# Patient Record
Sex: FEMALE | Race: WHITE | NOT HISPANIC OR LATINO | Employment: UNEMPLOYED | URBAN - METROPOLITAN AREA
[De-identification: names, ages, dates, MRNs, and addresses within clinical notes are randomized per-mention and may not be internally consistent; named-entity substitution may affect disease eponyms.]

---

## 2017-06-14 ENCOUNTER — OFFICE VISIT (OUTPATIENT)
Dept: FAMILY MEDICINE | Facility: CLINIC | Age: 32
End: 2017-06-14
Payer: COMMERCIAL

## 2017-06-14 VITALS
TEMPERATURE: 97 F | HEART RATE: 91 BPM | DIASTOLIC BLOOD PRESSURE: 74 MMHG | WEIGHT: 156 LBS | BODY MASS INDEX: 23.64 KG/M2 | SYSTOLIC BLOOD PRESSURE: 122 MMHG | OXYGEN SATURATION: 100 % | HEIGHT: 68 IN

## 2017-06-14 DIAGNOSIS — Z30.41 ENCOUNTER FOR SURVEILLANCE OF CONTRACEPTIVE PILLS: Primary | ICD-10-CM

## 2017-06-14 PROCEDURE — 99214 OFFICE O/P EST MOD 30 MIN: CPT | Performed by: FAMILY MEDICINE

## 2017-06-14 RX ORDER — ACETAMINOPHEN AND CODEINE PHOSPHATE 120; 12 MG/5ML; MG/5ML
1 SOLUTION ORAL DAILY
Qty: 84 TABLET | Refills: 3 | Status: SHIPPED | OUTPATIENT
Start: 2017-06-14 | End: 2018-08-15

## 2017-06-14 NOTE — PROGRESS NOTES
SUBJECTIVE:                                                    Brie Brown is a 31 year old female who presents to clinic today for the following health issues:      Would like referral to [facility] for delivery.  Lacey Mota states in-[facility], ok for referral    Provider notes:  This is a new patient to our clinic  Has been seen in women's clinic for prenatal care  Eventually had transition of care to [facility] and delivered there.   no complications    Is breastfeeding and no concerns  Has PNV and takes these  Had a 6 week PP exam and feels well    Is planning to use condoms for now but eventually might want to use OCP - would like PG only pills to limit affect on lactation      Problem list and histories reviewed & adjusted, as indicated.  Additional history: as documented    Patient Active Problem List   Diagnosis     CARDIOVASCULAR SCREENING; LDL GOAL LESS THAN 160     Papanicolaou smear of cervix with low grade squamous intraepithelial lesion (LGSIL)     Need for Tdap vaccination     History of dilation and curettage     Pregnancy, supervision, normal, first     History reviewed. No pertinent surgical history.    Social History   Substance Use Topics     Smoking status: Never Smoker     Smokeless tobacco: Never Used     Alcohol use No      Comment: socially     Family History   Problem Relation Age of Onset     HEART DISEASE Paternal Grandfather      passsed away from MI     Hypertension Father      CEREBROVASCULAR DISEASE Paternal Grandmother      Prostate Cancer Paternal Grandfather      Alcohol/Drug Paternal Grandmother      Arthritis Maternal Grandmother      Depression Father      Depression Sister      Lipids Father      Lipids Mother          Current Outpatient Prescriptions   Medication Sig Dispense Refill     norethindrone (MICRONOR) 0.35 MG per tablet Take 1 tablet (0.35 mg) by mouth daily 84 tablet 3     Prenatal Multivit-Min-Fe-FA (PRENATAL VITAMINS PO)        BP Readings  "from Last 3 Encounters:   06/14/17 122/74   09/08/16 131/73   08/10/16 105/70    Wt Readings from Last 3 Encounters:   06/14/17 156 lb (70.8 kg)   09/08/16 157 lb 6.4 oz (71.4 kg)   08/10/16 154 lb (69.9 kg)                    Reviewed and updated as needed this visit by clinical staff  Tobacco  Allergies  Meds  Med Hx  Surg Hx  Fam Hx  Soc Hx      Reviewed and updated as needed this visit by Provider         ROS:  Constitutional, HEENT, cardiovascular, pulmonary, gi and gu systems are negative, except as otherwise noted.    OBJECTIVE:                                                    /74  Pulse 91  Temp 97  F (36.1  C) (Oral)  Ht 5' 8\" (1.727 m)  Wt 156 lb (70.8 kg)  LMP 05/28/2016  SpO2 100%  Breastfeeding? Yes  BMI 23.72 kg/m2  Body mass index is 23.72 kg/(m^2).  GENERAL APPEARANCE: healthy, alert and no distress         ASSESSMENT/PLAN:                                                    1. Encounter for surveillance of contraceptive pills  Medications reviewed and sent in  Discussed risks and benefits to this OCP and how to take  - norethindrone (MICRONOR) 0.35 MG per tablet; Take 1 tablet (0.35 mg) by mouth daily  Dispense: 84 tablet; Refill: 3    2. Routine postpartum follow-up  Referral for her delivery  - OB/GYN REFERRAL      Follow up with Provider - as needed     Heena Valverde MD  [facility] [place]    "

## 2017-06-14 NOTE — MR AVS SNAPSHOT
After Visit Summary   6/14/2017    Brie Brown    MRN: [id number]           Patient Information     Date Of Birth          1985        Visit Information        Provider Department      6/14/2017 8:30 AM Heena Valverde MD [facility]        Today's Diagnoses     Encounter for surveillance of contraceptive pills    -  1    Routine postpartum follow-up           Follow-ups after your visit        Additional Services     OB/GYN REFERRAL       Your provider has referred you to:  Routine prenatal care with OBGYN/Delivery center    Please be aware that coverage of these services is subject to the terms and limitations of your health insurance plan.  Call member services at your health plan with any benefit or coverage questions.      Please bring the following with you to your appointment:    (1) Any X-Rays, CTs or MRIs which have been performed.  Contact the facility where they were done to arrange for  prior to your scheduled appointment.   (2) List of current medications   (3) This referral request   (4) Any documents/labs given to you for this referral                  Who to contact     If you have questions or need follow up information about today's clinic visit or your schedule please contact [facility] directly at [phone].  Normal or non-critical lab and imaging results will be communicated to you by MyChart, letter or phone within 4 business days after the clinic has received the results. If you do not hear from us within 7 days, please contact the clinic through MyChart or phone. If you have a critical or abnormal lab result, we will notify you by phone as soon as possible.  Submit refill requests through [employer] or call your pharmacy and they will forward the refill request to us. Please allow 3 business days for your refill to be completed.          Additional Information About Your Visit        MyChart Information     [employer] gives you secure  "access to your electronic health record. If you see a primary care provider, you can also send messages to your care team and make appointments. If you have questions, please call your primary care clinic.  If you do not have a primary care provider, please call [phone] and they will assist you.        Care EveryWhere ID     This is your Care EveryWhere ID. This could be used by other organizations to access your [place] medical records  [id number]        Your Vitals Were     Pulse Temperature Height Last Period Pulse Oximetry Breastfeeding?    91 97  F (36.1  C) (Oral) 5' 8\" (1.727 m) 05/28/2016 100% Yes    BMI (Body Mass Index)                   23.72 kg/m2            Blood Pressure from Last 3 Encounters:   06/14/17 122/74   09/08/16 131/73   08/10/16 105/70    Weight from Last 3 Encounters:   06/14/17 156 lb (70.8 kg)   09/08/16 157 lb 6.4 oz (71.4 kg)   08/10/16 154 lb (69.9 kg)              We Performed the Following     OB/GYN REFERRAL          Today's Medication Changes          These changes are accurate as of: 6/14/17 10:06 AM.  If you have any questions, ask your nurse or doctor.               Start taking these medicines.        Dose/Directions    norethindrone 0.35 MG per tablet   Commonly known as:  MICRONOR   Used for:  Encounter for surveillance of contraceptive pills   Started by:  Heena Valverde MD        Dose:  1 tablet   Take 1 tablet (0.35 mg) by mouth daily   Quantity:  84 tablet   Refills:  3         Stop taking these medicines if you haven't already. Please contact your care team if you have questions.     levonorgestrel-ethinyl estradiol 0.1-20 MG-MCG per tablet   Commonly known as:  AVIANE   Stopped by:  Heena Valverde MD           ZANTAC PO   Stopped by:  Heena Valverde MD                Where to get your medicines      These medications were sent to [id number] - [place], [place] - [address] AT [address] [place] [address]     Phone:  [phone]     norethindrone 0.35 MG per tablet                Primary Care Provider Office Phone # Fax #    Alanna Daniela Head [phone]       [address] [address]        Thank you!     Thank you for choosing [facility]  for your care. Our goal is always to provide you with excellent care. Hearing back from our patients is one way we can continue to improve our services. Please take a few minutes to complete the written survey that you may receive in the mail after your visit with us. Thank you!             Your Updated Medication List - Protect others around you: Learn how to safely use, store and throw away your medicines at www.disposemymeds.org.          This list is accurate as of: 6/14/17 10:06 AM.  Always use your most recent med list.                   Brand Name Dispense Instructions for use    norethindrone 0.35 MG per tablet    MICRONOR    84 tablet    Take 1 tablet (0.35 mg) by mouth daily       PRENATAL VITAMINS PO

## 2017-06-14 NOTE — NURSING NOTE
"Chief Complaint   Patient presents with     Referral     pt needs referral for [facility]     /74  Pulse 91  Temp 97  F (36.1  C) (Oral)  Ht 5' 8\" (1.727 m)  Wt 156 lb (70.8 kg)  LMP 05/28/2016  SpO2 100%  Breastfeeding? Yes  BMI 23.72 kg/m2 Estimated body mass index is 23.72 kg/(m^2) as calculated from the following:    Height as of this encounter: 5' 8\" (1.727 m).    Weight as of this encounter: 156 lb (70.8 kg).  Medication Reconciliation: complete      Health Maintenance due pending provider review:  NONE    n/a    Paulette Couch CMA  "

## 2017-10-04 ENCOUNTER — OFFICE VISIT (OUTPATIENT)
Dept: FAMILY MEDICINE | Facility: CLINIC | Age: 32
End: 2017-10-04
Payer: COMMERCIAL

## 2017-10-04 VITALS
HEART RATE: 78 BPM | WEIGHT: 156 LBS | RESPIRATION RATE: 16 BRPM | BODY MASS INDEX: 23.64 KG/M2 | DIASTOLIC BLOOD PRESSURE: 76 MMHG | SYSTOLIC BLOOD PRESSURE: 120 MMHG | HEIGHT: 68 IN | OXYGEN SATURATION: 100 %

## 2017-10-04 DIAGNOSIS — R07.0 THROAT PAIN: Primary | ICD-10-CM

## 2017-10-04 DIAGNOSIS — J01.00 ACUTE NON-RECURRENT MAXILLARY SINUSITIS: ICD-10-CM

## 2017-10-04 LAB
DEPRECATED S PYO AG THROAT QL EIA: NORMAL
SPECIMEN SOURCE: NORMAL

## 2017-10-04 PROCEDURE — 87081 CULTURE SCREEN ONLY: CPT | Performed by: FAMILY MEDICINE

## 2017-10-04 PROCEDURE — 87880 STREP A ASSAY W/OPTIC: CPT | Performed by: FAMILY MEDICINE

## 2017-10-04 PROCEDURE — 99214 OFFICE O/P EST MOD 30 MIN: CPT | Performed by: FAMILY MEDICINE

## 2017-10-04 RX ORDER — AZITHROMYCIN 250 MG/1
TABLET, FILM COATED ORAL
Qty: 6 TABLET | Refills: 0 | Status: SHIPPED | OUTPATIENT
Start: 2017-10-04 | End: 2018-05-24

## 2017-10-04 NOTE — MR AVS SNAPSHOT
"              After Visit Summary   10/4/2017    Brie Brown    MRN: [id number]           Patient Information     Date Of Birth          1985        Visit Information        Provider Department      10/4/2017 2:15 PM Yohana Kline MD [facility]        Today's Diagnoses     Throat pain    -  1    Acute non-recurrent maxillary sinusitis           Follow-ups after your visit        Who to contact     If you have questions or need follow up information about today's clinic visit or your schedule please contact [facility] directly at [phone].  Normal or non-critical lab and imaging results will be communicated to you by [employer], letter or phone within 4 business days after the clinic has received the results. If you do not hear from us within 7 days, please contact the clinic through [employer] or phone. If you have a critical or abnormal lab result, we will notify you by phone as soon as possible.  Submit refill requests through [employer] or call your pharmacy and they will forward the refill request to us. Please allow 3 business days for your refill to be completed.          Additional Information About Your Visit        MyChart Information     [employer] gives you secure access to your electronic health record. If you see a primary care provider, you can also send messages to your care team and make appointments. If you have questions, please call your primary care clinic.  If you do not have a primary care provider, please call [phone] and they will assist you.        Care EveryWhere ID     This is your Care EveryWhere ID. This could be used by other organizations to access your [place] medical records  [id number]        Your Vitals Were     Pulse Respirations Height Pulse Oximetry Breastfeeding? BMI (Body Mass Index)    78 16 5' 8\" (1.727 m) 100% Yes 23.72 kg/m2       Blood Pressure from Last 3 Encounters:   10/04/17 120/76   06/14/17 122/74   09/08/16 131/73    Weight from Last " 3 Encounters:   10/04/17 156 lb (70.8 kg)   06/14/17 156 lb (70.8 kg)   09/08/16 157 lb 6.4 oz (71.4 kg)              We Performed the Following     Beta strep group A culture     Rapid strep screen          Today's Medication Changes          These changes are accurate as of: 10/4/17  3:59 PM.  If you have any questions, ask your nurse or doctor.               Start taking these medicines.        Dose/Directions    azithromycin 250 MG tablet   Commonly known as:  ZITHROMAX   Used for:  Acute non-recurrent maxillary sinusitis   Started by:  Yohana Kline MD        Two tablets first day, then one tablet daily for four days.   Quantity:  6 tablet   Refills:  0            Where to get your medicines      These medications were sent to [facility] [address] [place] [address] [address] [place] [address] [place] [address]     Phone:  [phone]     azithromycin 250 MG tablet                Primary Care Provider Office Phone # Fax #    Alanna Daniela Head [phone]       [facility] [address]        Equal Access to Services     [facility]: Hadii loren castro [place], waaxda luxiang, qaybta kaalmada teri, moo ramírez . So [facility] [phone].    ATENCIÓN: Si habla español, tiene a angel disposición servicios gratuitos de asistencia lingüística. LlThe [facility] [phone].    We comply with applicable federal civil rights laws and [place] laws. We do not discriminate on the basis of race, color, national origin, age, disability, sex, sexual orientation, or gender identity.            Thank you!     Thank you for choosing [facility]  for your care. Our goal is always to provide you with excellent care. Hearing back from our patients is one way we can continue to improve our services. Please take a few minutes to complete the written survey that you may receive in the mail after your visit with us.  Thank you!             Your Updated Medication List - Protect others around you: Learn how to safely use, store and throw away your medicines at www.disposemymeds.org.          This list is accurate as of: 10/4/17  3:59 PM.  Always use your most recent med list.                   Brand Name Dispense Instructions for use Diagnosis    azithromycin 250 MG tablet    ZITHROMAX    6 tablet    Two tablets first day, then one tablet daily for four days.    Acute non-recurrent maxillary sinusitis       norethindrone 0.35 MG per tablet    MICRONOR    84 tablet    Take 1 tablet (0.35 mg) by mouth daily    Encounter for surveillance of contraceptive pills       PRENATAL VITAMINS PO

## 2017-10-04 NOTE — PROGRESS NOTES
SUBJECTIVE:   Brie Brown is a 32 year old female who presents to clinic today for the following health issues:      RESPIRATORY SYMPTOMS      Duration: 2 weeks    Description  nasal congestion, sore throat, facial pain/pressure and cough    Severity: moderate    Accompanying signs and symptoms: Extremely tired.    History (predisposing factors):  none    Precipitating or alleviating factors: Patients son was sick recently    Therapies tried and outcome:  Mucinex, Neti Pot- only getting minimal relief.            Problem list and histories reviewed & adjusted, as indicated.  Additional history: as documented    Patient Active Problem List   Diagnosis     CARDIOVASCULAR SCREENING; LDL GOAL LESS THAN 160     Papanicolaou smear of cervix with low grade squamous intraepithelial lesion (LGSIL)     Need for Tdap vaccination     History of dilation and curettage     Pregnancy, supervision, normal, first     No past surgical history on file.    Social History   Substance Use Topics     Smoking status: Never Smoker     Smokeless tobacco: Never Used     Alcohol use No      Comment: socially     Family History   Problem Relation Age of Onset     HEART DISEASE Paternal Grandfather      passsed away from MI     Hypertension Father      CEREBROVASCULAR DISEASE Paternal Grandmother      Prostate Cancer Paternal Grandfather      Alcohol/Drug Paternal Grandmother      Arthritis Maternal Grandmother      Depression Father      Depression Sister      Lipids Father      Lipids Mother          Current Outpatient Prescriptions   Medication Sig Dispense Refill     azithromycin (ZITHROMAX) 250 MG tablet Two tablets first day, then one tablet daily for four days. 6 tablet 0     norethindrone (MICRONOR) 0.35 MG per tablet Take 1 tablet (0.35 mg) by mouth daily 84 tablet 3     Prenatal Multivit-Min-Fe-FA (PRENATAL VITAMINS PO)        Allergies   Allergen Reactions     Augmentin      High fever as a child     Seasonal Allergies      Recent  "Labs   Lab Test  04/13/11   0856   TSH  1.32      BP Readings from Last 3 Encounters:   10/04/17 120/76   06/14/17 122/74   09/08/16 131/73    Wt Readings from Last 3 Encounters:   10/04/17 156 lb (70.8 kg)   06/14/17 156 lb (70.8 kg)   09/08/16 157 lb 6.4 oz (71.4 kg)                  Labs reviewed in EPIC          Reviewed and updated as needed this visit by clinical staffTobacco       Reviewed and updated as needed this visit by Provider         ROS:  Constitutional, HEENT, cardiovascular, pulmonary, GI, , musculoskeletal, neuro, skin, endocrine and psych systems are negative, except as otherwise noted.      OBJECTIVE:   /76  Pulse 78  Resp 16  Ht 5' 8\" (1.727 m)  Wt 156 lb (70.8 kg)  SpO2 100%  Breastfeeding? Yes  BMI 23.72 kg/m2  Body mass index is 23.72 kg/(m^2).  GENERAL: healthy, alert and no distress  EYES: Eyes grossly normal to inspection, PERRL and conjunctivae and sclerae normal  HENT: normal cephalic/atraumatic, ear canals and TM's normal, oropharynx clear, oral mucous membranes moist and sinuses: maxillary tenderness on bilaterally  NECK: no adenopathy, no asymmetry, masses, or scars and thyroid normal to palpation  RESP: lungs clear to auscultation - no rales, rhonchi or wheezes  CV: regular rate and rhythm, normal S1 S2, no S3 or S4, no murmur, click or rub, no peripheral edema and peripheral pulses strong  ABDOMEN: soft, nontender, no hepatosplenomegaly, no masses and bowel sounds normal  MS: no gross musculoskeletal defects noted, no edema    Diagnostic Test Results:  none     ASSESSMENT/PLAN:       1. Throat pain  Negative   - Rapid strep screen  - Beta strep group A culture    2. Acute non-recurrent maxillary sinusitis  We discussed treatment as it has been over two weeks now , also push fluids, rest , RTC if no improving or worsening.    - azithromycin (ZITHROMAX) 250 MG tablet; Two tablets first day, then one tablet daily for four days.  Dispense: 6 tablet; Refill: 0    Pt is " aware  and comfortable with the current plan.      Yohana Kline MD  [facility]

## 2017-10-05 LAB
BACTERIA SPEC CULT: NORMAL
SPECIMEN SOURCE: NORMAL

## 2018-04-01 ENCOUNTER — HEALTH MAINTENANCE LETTER (OUTPATIENT)
Age: 33
End: 2018-04-01

## 2018-05-24 ENCOUNTER — OFFICE VISIT (OUTPATIENT)
Dept: FAMILY MEDICINE | Facility: CLINIC | Age: 33
End: 2018-05-24
Payer: COMMERCIAL

## 2018-05-24 VITALS
OXYGEN SATURATION: 99 % | SYSTOLIC BLOOD PRESSURE: 91 MMHG | RESPIRATION RATE: 16 BRPM | HEART RATE: 71 BPM | WEIGHT: 145.6 LBS | DIASTOLIC BLOOD PRESSURE: 56 MMHG | TEMPERATURE: 97.6 F | BODY MASS INDEX: 22.14 KG/M2

## 2018-05-24 DIAGNOSIS — M67.911 ROTATOR CUFF DISORDER, RIGHT: Primary | ICD-10-CM

## 2018-05-24 PROCEDURE — 99213 OFFICE O/P EST LOW 20 MIN: CPT | Performed by: FAMILY MEDICINE

## 2018-05-24 NOTE — PROGRESS NOTES
SUBJECTIVE:   Brie Brown is a 32 year old female who presents to clinic today for the following health issues:      Joint Pain    Onset: x 1 month    Description:   Location: right shoulder  Character: Dull ache and moving in certain way it becomes a sharp pain     Intensity: 7/10- first waking up, but through out the day 3/10    Progression of Symptoms: worse    Accompanying Signs & Symptoms:  Other symptoms: numbness- when it first happened numbness was in ring finger. Now the ring finger is dull ache and get    History:   Previous similar pain: no       Precipitating factors:   Trauma or overuse: YES- pt stated that she slept wrong on shoulder     Alleviating factors:  Improved by: ibuprofen in the morning- Pt states that this is helping her    32-year-old who presents to the clinic for evaluation of right shoulder pain.  Pain has been present for the past 1 month.  It is usually worse in the morning when she wakes up and gets better throughout the day.  She denies any numbness or tingling but does report swelling in the right ring finger.  Medical history significant for - Swimmer tendonitis. 12 yrs ago.   The patient has a 15-month-old which she carries often.    Problem list and histories reviewed & adjusted, as indicated.  Additional history: as documented    Patient Active Problem List   Diagnosis     CARDIOVASCULAR SCREENING; LDL GOAL LESS THAN 160     Papanicolaou smear of cervix with low grade squamous intraepithelial lesion (LGSIL)     Need for Tdap vaccination     History of dilation and curettage     Pregnancy, supervision, normal, first     History reviewed. No pertinent surgical history.    Social History   Substance Use Topics     Smoking status: Never Smoker     Smokeless tobacco: Never Used     Alcohol use No      Comment: socially     Family History   Problem Relation Age of Onset     HEART DISEASE Paternal Grandfather      passsed away from MI     Hypertension Father      CEREBROVASCULAR  DISEASE Paternal Grandmother      Prostate Cancer Paternal Grandfather      Alcohol/Drug Paternal Grandmother      Arthritis Maternal Grandmother      Depression Father      Depression Sister      Lipids Father      Lipids Mother            Reviewed and updated as needed this visit by clinical staff  Tobacco  Allergies  Meds  Med Hx  Surg Hx  Fam Hx       Reviewed and updated as needed this visit by Provider         ROS:  Constitutional, HEENT, cardiovascular, pulmonary, gi and gu systems are negative, except as otherwise noted.    OBJECTIVE:     BP 91/56  Pulse 71  Temp 97.6  F (36.4  C) (Tympanic)  Resp 16  Wt 145 lb 9.6 oz (66 kg)  SpO2 99%  Breastfeeding? Yes  BMI 22.14 kg/m2  Body mass index is 22.14 kg/(m^2).  GENERAL: healthy, alert and no distress  RESP: lungs clear to auscultation - no rales, rhonchi or wheezes  CV: regular rate and rhythm, normal S1 S2, no S3 or S4, no murmur, click or rub, no peripheral edema and peripheral pulses strong  Shoulder exam: FROM in all planes, improved since last visit.  Rotator cuff/supraspinatous 5/5 strength.  With resisted external rotation has some mild pain in the right posterior shoulder/supraspinatous area. She also reports pain with abduction of the shoulder above 90 degrees.     Diagnostic Test Results:  none     ASSESSMENT/PLAN:   1. Rotator cuff disorder, right  Assessment: -Examinations consistent with a rotator cuff injury.  Patient was advised to continue with warm compress and over-the-counter medications for pain control.  She was also given written instructions regarding exercises of the shoulder.  She was advised to return to clinic if pain persists fails to improve despite the results.  Patient will be referred for physical therapy at that time.     Mayuri Monsivais MD  [facility]  PAGER: [phone] or (W): [phone]

## 2018-05-24 NOTE — PATIENT INSTRUCTIONS
Exercises for Shoulder Flexibility: External Rotation    This stretch can help restore shoulder flexibility and relieve pain over time. When stretching, be sure to breathe deeply. Follow any special instructions from your doctor or physical therapist:  1.  a doorway. Grasp the doorjamb with the hand on the frozen side. Your arm should be bent.  2. With the other hand, hold the elbow on the frozen side firmly against your body.  3. Standing in the same spot, rotate your body away from the doorjamb. Stop when you feel the stretch in the shoulder. At first, try to hold the stretch for 5 seconds.  4. Work up to doing 3 sets of this stretch, 3 times a day. Work up to holding the stretch for 30 to 60 seconds.  Note: Keep your arms as still as you can. Over time, rotate your body a little more to enhance the stretch. But be careful not to twist your back.  Frozen shoulder  Frozen shoulder is another name for adhesive capsulitis, which causes restricted movement in the shoulder. If you have frozen shoulder, this stretch may cause discomfort, especially when you first get started. A few months may pass before you achieve the results you want. But once your shoulder heals, it rarely becomes frozen again. So stick to your stretching program. If you have any questions, be sure to ask your doctor.   Date Last Reviewed: 8/16/2015 2000-2017 [employer]. [address] [place] [address], [place] [address]. All rights reserved. This information is not intended as a substitute for professional medical care. Always follow your healthcare professional's instructions.        Exercises for Shoulder Flexibility: Wall Walk    Improving your flexibility can reduce pain. Stretching exercises also can help increase your range of pain-free motion. Breathe normally when you exercise. Use smooth, fluid movements.  Note: Follow any special instructions you are given. If you feel pain, stop the exercise. If the pain continues  after stopping, call your healthcare provider:    Stand with your shoulder about 2 feet from the wall.    Raise your arm to shoulder level and gently  walk  your fingers up the wall as high as you can.    Hold for a few seconds. Then walk your fingers back down.    Repeat 3 times. Move closer to the wall as you repeat.    Build up to holding each stretch for 30 seconds.  Caution: Do this stretch only if your healthcare provider recommends it. Don t do it when you are first injured.       Date Last Reviewed: 8/16/2015 2000-2017 [employer]. [address] [place] [address]. All rights reserved. This information is not intended as a substitute for professional medical care. Always follow your healthcare professional's instructions.        Exercises for Shoulder Flexibility: Internal Rotation    This stretch can help restore shoulder flexibility and relieve pain over time. When stretching, be sure to breathe deeply. Follow any special instructions from your healthcare provider or physical therapist.  1. While seated, move the arm on the side you want to stretch toward the middle of your back. The palm of your hand should face out.  2. Cup your other hand under the hand that s behind your back. Gently push your cupped hand upward until you feel the stretch in the shoulder. Try to hold the stretch for 5 seconds.  3. Work up to doing 3 sets of this stretch, 3 times a day. Work up to holding the stretch for 30 to 60 seconds.  Note: Keep your back straight. It s OK if your hand can t reach the middle of your back. Instead, start the stretch with your hand as close as you can get it to the middle of your back.  Frozen shoulder  Frozen shoulder is another name for adhesive capsulitis. This causes restricted movement in the shoulder. If you have frozen shoulder, this stretch may cause discomfort, especially when you first get started. A few months may pass before you achieve the results you want. But  once your shoulder heals, it rarely becomes frozen again. So stick to your stretching program. If you have any questions, be sure to ask your healthcare provider.   Date Last Reviewed: 12/1/2017 2000-2017 [employer]. [address] [place] [address], [place] [address]. All rights reserved. This information is not intended as a substitute for professional medical care. Always follow your healthcare professional's instructions.

## 2018-05-24 NOTE — MR AVS SNAPSHOT
After Visit Summary   5/24/2018    Brie Brown    MRN: [id number]           Patient Information     Date Of Birth          1985        Visit Information        Provider Department      5/24/2018 11:20 AM Mayuri Monsivais MD [facility]        Today's Diagnoses     Shoulder impingement syndrome, left    -  1      Care Instructions      Exercises for Shoulder Flexibility: External Rotation    This stretch can help restore shoulder flexibility and relieve pain over time. When stretching, be sure to breathe deeply. Follow any special instructions from your doctor or physical therapist:  1.  a doorway. Grasp the doorjamb with the hand on the frozen side. Your arm should be bent.  2. With the other hand, hold the elbow on the frozen side firmly against your body.  3. Standing in the same spot, rotate your body away from the doorjamb. Stop when you feel the stretch in the shoulder. At first, try to hold the stretch for 5 seconds.  4. Work up to doing 3 sets of this stretch, 3 times a day. Work up to holding the stretch for 30 to 60 seconds.  Note: Keep your arms as still as you can. Over time, rotate your body a little more to enhance the stretch. But be careful not to twist your back.  Frozen shoulder  Frozen shoulder is another name for adhesive capsulitis, which causes restricted movement in the shoulder. If you have frozen shoulder, this stretch may cause discomfort, especially when you first get started. A few months may pass before you achieve the results you want. But once your shoulder heals, it rarely becomes frozen again. So stick to your stretching program. If you have any questions, be sure to ask your doctor.   Date Last Reviewed: 8/16/2015 2000-2017 [employer]. [address] [place] [address]. All rights reserved. This information is not intended as a substitute for professional medical care. Always follow your healthcare professional's  instructions.        Exercises for Shoulder Flexibility: Wall Walk    Improving your flexibility can reduce pain. Stretching exercises also can help increase your range of pain-free motion. Breathe normally when you exercise. Use smooth, fluid movements.  Note: Follow any special instructions you are given. If you feel pain, stop the exercise. If the pain continues after stopping, call your healthcare provider:    Stand with your shoulder about 2 feet from the wall.    Raise your arm to shoulder level and gently  walk  your fingers up the wall as high as you can.    Hold for a few seconds. Then walk your fingers back down.    Repeat 3 times. Move closer to the wall as you repeat.    Build up to holding each stretch for 30 seconds.  Caution: Do this stretch only if your healthcare provider recommends it. Don t do it when you are first injured.       Date Last Reviewed: 8/16/2015 2000-2017 [employer]. [address] [place] [address]. All rights reserved. This information is not intended as a substitute for professional medical care. Always follow your healthcare professional's instructions.        Exercises for Shoulder Flexibility: Internal Rotation    This stretch can help restore shoulder flexibility and relieve pain over time. When stretching, be sure to breathe deeply. Follow any special instructions from your healthcare provider or physical therapist.  1. While seated, move the arm on the side you want to stretch toward the middle of your back. The palm of your hand should face out.  2. Cup your other hand under the hand that s behind your back. Gently push your cupped hand upward until you feel the stretch in the shoulder. Try to hold the stretch for 5 seconds.  3. Work up to doing 3 sets of this stretch, 3 times a day. Work up to holding the stretch for 30 to 60 seconds.  Note: Keep your back straight. It s OK if your hand can t reach the middle of your back. Instead, start the  stretch with your hand as close as you can get it to the middle of your back.  Frozen shoulder  Frozen shoulder is another name for adhesive capsulitis. This causes restricted movement in the shoulder. If you have frozen shoulder, this stretch may cause discomfort, especially when you first get started. A few months may pass before you achieve the results you want. But once your shoulder heals, it rarely becomes frozen again. So stick to your stretching program. If you have any questions, be sure to ask your healthcare provider.   Date Last Reviewed: 12/1/2017 2000-2017 "[employer]. [address] [place] [address]. All rights reserved. This information is not intended as a substitute for professional medical care. Always follow your healthcare professional's instructions.                Follow-ups after your visit        Follow-up notes from your care team     Return in about 4 weeks (around 6/21/2018).      Who to contact     If you have questions or need follow up information about today's clinic visit or your schedule please contact [facility] directly at [phone].  Normal or non-critical lab and imaging results will be communicated to you by Getbazzahart, letter or phone within 4 business days after the clinic has received the results. If you do not hear from us within 7 days, please contact the clinic through "[employer]t or phone. If you have a critical or abnormal lab result, we will notify you by phone as soon as possible.  Submit refill requests through [facility] or call your pharmacy and they will forward the refill request to us. Please allow 3 business days for your refill to be completed.          Additional Information About Your Visit        GetbazzaharFirmPlay Information     [facility] gives you secure access to your electronic health record. If you see a primary care provider, you can also send messages to your care team and make appointments. If you have questions, please call your  primary care clinic.  If you do not have a primary care provider, please call [phone] and they will assist you.        Care EveryWhere ID     This is your Care EveryWhere ID. This could be used by other organizations to access your [place] medical records  [id number]        Your Vitals Were     Pulse Temperature Respirations Pulse Oximetry Breastfeeding? BMI (Body Mass Index)    71 97.6  F (36.4  C) (Tympanic) 16 99% Yes 22.14 kg/m2       Blood Pressure from Last 3 Encounters:   05/24/18 91/56   10/04/17 120/76   06/14/17 122/74    Weight from Last 3 Encounters:   05/24/18 145 lb 9.6 oz (66 kg)   10/04/17 156 lb (70.8 kg)   06/14/17 156 lb (70.8 kg)              Today, you had the following     No orders found for display       Primary Care Provider Office Phone # Fax #    Alanna Head [phone] [address] [facility] [address]        Equal Access to Services     CORINNA [facility] Hospital[facility]Y : Hadii aad ku hadasho Soomaali, waaxda luqadaha, qaybta kaalmada adeegyada, moo irby haymarie ramírez . So [facility] [phone].    ATENCIÓN: Si habla español, tiene a angel disposición servicios gratuitos de asistencia lingüística. Llame al [phone].    We comply with applicable federal civil rights laws and [place] laws. We do not discriminate on the basis of race, color, national origin, age, disability, sex, sexual orientation, or gender identity.            Thank you!     Thank you for choosing [facility]  for your care. Our goal is always to provide you with excellent care. Hearing back from our patients is one way we can continue to improve our services. Please take a few minutes to complete the written survey that you may receive in the mail after your visit with us. Thank you!             Your Updated Medication List - Protect others around you: Learn how to safely use, store and throw away your medicines at www.disposemymeds.org.          This list is accurate as  of 5/24/18 11:37 AM.  Always use your most recent med list.                   Brand Name Dispense Instructions for use Diagnosis    norethindrone 0.35 MG per tablet    MICRONOR    84 tablet    Take 1 tablet (0.35 mg) by mouth daily    Encounter for surveillance of contraceptive pills       PRENATAL VITAMINS PO

## 2018-08-06 ENCOUNTER — NURSE TRIAGE (OUTPATIENT)
Dept: NURSING | Facility: CLINIC | Age: 33
End: 2018-08-06

## 2018-08-06 ENCOUNTER — TELEPHONE (OUTPATIENT)
Dept: OBGYN | Facility: CLINIC | Age: 33
End: 2018-08-06

## 2018-08-06 NOTE — TELEPHONE ENCOUNTER
"Brie has upcoming first prenatal appt with Bobby BRADFORD CNP on 8/21/18.  She reports history of anemia with previous pregnancies, and is feeling \"cold a lot\".  Wondering if she can and should start taking OTC Ferrous Gluconate 325 mg daily ?   Or should she wait to be seen ?  Unable to give permission to do so.  Can you please call Brie and advise ?      Thanks  Kristi David RN  FNA    "

## 2018-08-07 DIAGNOSIS — O21.9 NAUSEA AND VOMITING IN PREGNANCY: Primary | ICD-10-CM

## 2018-08-07 RX ORDER — ONDANSETRON 4 MG/1
4-8 TABLET, ORALLY DISINTEGRATING ORAL EVERY 8 HOURS PRN
Qty: 40 TABLET | Refills: 1 | Status: SHIPPED | OUTPATIENT
Start: 2018-08-07 | End: 2018-11-07

## 2018-08-07 NOTE — TELEPHONE ENCOUNTER
Spoke with Brie re: NOB labs and waiting for iron level before supplementing.  Pt agreeable to wait until 8/15/18 as she will be having lab work done that visit.  Heydi BRADFORD CNM

## 2018-08-14 PROBLEM — D64.9 ANEMIA: Status: ACTIVE | Noted: 2018-08-14

## 2018-08-15 ENCOUNTER — OFFICE VISIT (OUTPATIENT)
Dept: OBGYN | Facility: CLINIC | Age: 33
End: 2018-08-15
Attending: ADVANCED PRACTICE MIDWIFE
Payer: COMMERCIAL

## 2018-08-15 ENCOUNTER — RADIANT APPOINTMENT (OUTPATIENT)
Dept: ULTRASOUND IMAGING | Facility: CLINIC | Age: 33
End: 2018-08-15
Attending: ADVANCED PRACTICE MIDWIFE
Payer: COMMERCIAL

## 2018-08-15 VITALS
BODY MASS INDEX: 21.32 KG/M2 | SYSTOLIC BLOOD PRESSURE: 101 MMHG | HEIGHT: 68 IN | DIASTOLIC BLOOD PRESSURE: 65 MMHG | WEIGHT: 140.7 LBS | HEART RATE: 86 BPM

## 2018-08-15 DIAGNOSIS — Z34.80 SUPERVISION OF OTHER NORMAL PREGNANCY, ANTEPARTUM: Primary | ICD-10-CM

## 2018-08-15 DIAGNOSIS — Z34.91 ENCOUNTER FOR SUPERVISION OF NORMAL PREGNANCY IN FIRST TRIMESTER, UNSPECIFIED GRAVIDITY: ICD-10-CM

## 2018-08-15 PROBLEM — D64.9 ANEMIA: Status: RESOLVED | Noted: 2018-08-14 | Resolved: 2018-08-15

## 2018-08-15 PROCEDURE — 76801 OB US < 14 WKS SINGLE FETUS: CPT

## 2018-08-15 PROCEDURE — G0463 HOSPITAL OUTPT CLINIC VISIT: HCPCS | Mod: 25,ZF

## 2018-08-15 PROCEDURE — 86900 BLOOD TYPING SEROLOGIC ABO: CPT | Performed by: ADVANCED PRACTICE MIDWIFE

## 2018-08-15 NOTE — LETTER
8/15/2018       RE: Brie Brown  [address]  [facility] [address]     Dear Colleague,    Thank you for referring your patient, Brie Brown, to the [facility] at [facility]. Please see a copy of my visit note below.    [facility] OB Intake note  Subjective   33 year old woman presents to clinic for initiation of OB care.  Patient's last menstrual period was 06/15/2018.  at 8w5d by Estimated Date of Delivery: Mar 22, 2019 based on LMP US confirms. Reviewed dating ultrasound. Pregnancy is planned.     Of note, Brie's first son was born at the [facility]. Overall she had a positive birth experience, however, her son was later admitted briefly to the NICU for TTN. She feels it would have been a smoother experience had he been born in hospital, which is why she is choosing to labor in the hospital this time.       Symptoms since LMP include nausea and vomiting, constipation, occasional cramping, insomnia.  Patient has tried these relief measures: Zofran, diet modification, vitamin B-6 and small frequent meals.     Caffeine intake: none.   Tobacco, EtOH, Drug use: none  R/f TORCH exposure: son in . Desires CMV testing.   Dietary restrictions: none    - Genetic/Infection questionnaire completed, risks include: none identified. Sister has VWD but Brie has tested negative.   Have you traveled during the pregnancy? No. Planning a trip to [place] in September.   Have your sexual partner(s) travelled during the pregnancy? No.     - Current Medications   Current Outpatient Prescriptions   Medication Sig Dispense Refill     ondansetron (ZOFRAN ODT) 4 MG ODT tab Take 1-2 tablets (4-8 mg) by mouth every 8 hours as needed for nausea 40 tablet 1     Prenatal Multivit-Min-Fe-FA (PRENATAL VITAMINS PO)            - Co-morbids   Past Medical History:   Diagnosis Date     Abnormal Pap smear     normals since     Anemia 2016    during last pregnancy      LSIL (low grade squamous intraepithelial lesion) on Pap smear 09     NO ACTIVE PROBLEMS      - Risk for GDM -  None identified.    - The patient does not h/o Pre Eclampsia, Current multi fetal gestation, Pre Gestational Diabetes (Type 1 or Type 2), chronic hypertension, renal disease, Autoimmune disease (systematic lupus erythematosus, antiphospholipid syndrome) so WILL NOT start low dose aspirin (81mg) starting between 12 and 28 weeks to prevent preeclampsia.    - The patient  does not have a history of spontaneous  birth so  WILL NOT consider progesterone starting at 16-20 weeks and/or serial transvaginal cervical length ultrasounds from 16-24 weeks.         PERSONAL/SOCIAL HISTORY   to [place], [place] [facility] . He is from [place].  Lives with their spouse.  Employment: Full time.  Her job involves light activity .  Additional items: Denies past or present domestic violence, sexual and psychological abuse.    Objective  -VS: reviewed and within normal limits   -General appearance: no acute distress, patient is comfortable   NEUROLOGICAL/PSYCHIATRIC   - Orientated x3,   -Mood and affect: : normal     Assessment/Plan  Brie was seen today for prenatal care.    Diagnoses and all orders for this visit:    Supervision of other normal pregnancy, antepartum  -     ABO/Rh type and screen  -     Hepatitis B surface antigen  -     CBC with platelets  -     HIV Antigen Antibody Combo  -     Rubella Antibody IgG Quantitative  -     Treponema Abs w Reflex to RPR and Titer  -     Urine Culture Aerobic Bacterial  -     Vitamin D Deficiency  -     CMV Antibody IgG  -     CMV antibody IgM  -     Cancel: [facility] REFERRAL-PREGNANCY      33 year old  8w5d weeks of pregnancy with YANG of Mar 22, 2019 by LMP of Patient's last menstrual period was 06/15/2018.  Outpatient Encounter Prescriptions as of 8/15/2018   Medication Sig Dispense Refill     ondansetron (ZOFRAN ODT) 4 MG  ODT tab Take 1-2 tablets (4-8 mg) by mouth every 8 hours as needed for nausea 40 tablet 1     Prenatal Multivit-Min-Fe-FA (PRENATAL VITAMINS PO)        [DISCONTINUED] norethindrone (MICRONOR) 0.35 MG per tablet Take 1 tablet (0.35 mg) by mouth daily (Patient not taking: Reported on 5/24/2018) 84 tablet 3     No facility-administered encounter medications on file as of 8/15/2018.       Orders Placed This Encounter   Procedures     Hepatitis B surface antigen     CBC with platelets     HIV Antigen Antibody Combo     Rubella Antibody IgG Quantitative     Treponema Abs w Reflex to RPR and Titer     Vitamin D Deficiency     CMV Antibody IgG     CMV antibody IgM     ABO/Rh type and screen       - Oriented to Practice, types of care, and how to reach a provider.  Pt prefers CN care.   - Patient received 1st trimester new OB education packet complete with aide of [facility] booklet including information on genetic screening test options.  - Patient would like first trimester screening, but would like to wait until the next visit to order (works at [employer], has not shared pregnancy with coworkers yet)  - Patient was encouraged to continue prenatal vitamins as tolerated.    - Patient was sent to lab for routine OB labs including CMV igG/IgM.     - CMV prevention discussed.  - Pregnancy concerns to be addressed by provider at new OB exam include: 1 order first trimester screen and level II, 2. Due for pap 3. Travel precautions for trip to [place] in September    Pt to RTO for NOB visit in 2 weeks and prn if questions or concerns    SANCHEZ Pugh CNM            Again, thank you for allowing me to participate in the care of your patient.      Sincerely,    SANCHEZ Pugh CNM

## 2018-08-15 NOTE — MR AVS SNAPSHOT
After Visit Summary   8/15/2018    Brie Brown    MRN: [id number]           Patient Information     Date Of Birth          1985        Visit Information        Provider Department      8/15/2018 2:00 PM Lindsay Jean APRN CNM [facility]        Today's Diagnoses     Supervision of other normal pregnancy, antepartum    -  1       Follow-ups after your visit        Follow-up notes from your care team     Return in about 2 weeks (around 8/29/2018) for NOB.      Your next 10 appointments already scheduled     Aug 29, 2018  8:45 AM CDT   NEW OB with SANCHEZ Bolden CNM   [facility] ([facility])    [facility]  [address]  [address]  [facility] [address] [phone]              Who to contact     Please call your clinic at [phone] to:    Ask questions about your health    Make or cancel appointments    Discuss your medicines    Learn about your test results    Speak to your doctor            Additional Information About Your Visit        MyChart Information     [employer] gives you secure access to your electronic health record. If you see a primary care provider, you can also send messages to your care team and make appointments. If you have questions, please call your primary care clinic.  If you do not have a primary care provider, please call [phone] and they will assist you.      [employer] is an electronic gateway that provides easy, online access to your medical records. With [employer], you can request a clinic appointment, read your test results, renew a prescription or communicate with your care team.     To access your existing account, please contact your [facility] or call [phone] for assistance.        Care EveryWhere ID     This is your Care EveryWhere ID. This could be used by other organizations to access your [place] medical records  [id number]    "     Your Vitals Were     Pulse Height Last Period BMI (Body Mass Index)          86 1.727 m (5' 8\") 06/15/2018 21.39 kg/m2         Blood Pressure from Last 3 Encounters:   08/15/18 101/65   05/24/18 91/56   10/04/17 120/76    Weight from Last 3 Encounters:   08/15/18 63.8 kg (140 lb 11.2 oz)   05/24/18 66 kg (145 lb 9.6 oz)   10/04/17 70.8 kg (156 lb)              We Performed the Following     ABO/Rh type and screen     CBC with platelets     CMV Antibody IgG     CMV antibody IgM     Hepatitis B surface antigen     HIV Antigen Antibody Combo     Rubella Antibody IgG Quantitative     Treponema Abs w Reflex to RPR and Titer     Urine Culture Aerobic Bacterial     Vitamin D Deficiency          Today's Medication Changes          These changes are accurate as of 8/15/18  3:32 PM.  If you have any questions, ask your nurse or doctor.               Stop taking these medicines if you haven't already. Please contact your care team if you have questions.     norethindrone 0.35 MG per tablet   Commonly known as:  MICRONOR   Stopped by:  Lindsay Jean APRN CNM                    Primary Care Provider Office Phone # Fax #    Alanna Daniela Head [phone]       [facility] [place] [address]        Equal Access to Services     MARCUS SNYDER AH: Rafaela zarcoo [place], waaxda luqadaha, qaybta kaalmada adeegyada, moo anderson. So [facility] [phone].    ATENCIÓN: Si habla español, tiene a angel disposición servicios gratuitos de asistencia lingüística. ame al [phone].    We comply with applicable federal civil rights laws and [place] laws. We do not discriminate on the basis of race, color, national origin, age, disability, sex, sexual orientation, or gender identity.            Thank you!     Thank you for choosing [facility]  for your care. Our goal is always to provide you with excellent care. Hearing back from our patients is one way we " can continue to improve our services. Please take a few minutes to complete the written survey that you may receive in the mail after your visit with us. Thank you!             Your Updated Medication List - Protect others around you: Learn how to safely use, store and throw away your medicines at www.disposemymeds.org.          This list is accurate as of 8/15/18  3:32 PM.  Always use your most recent med list.                   Brand Name Dispense Instructions for use Diagnosis    ondansetron 4 MG ODT tab    ZOFRAN ODT    40 tablet    Take 1-2 tablets (4-8 mg) by mouth every 8 hours as needed for nausea    Nausea and vomiting in pregnancy       PRENATAL VITAMINS PO

## 2018-08-15 NOTE — PROGRESS NOTES
S OB Intake note  Subjective   33 year old woman presents to clinic for initiation of OB care.  Patient's last menstrual period was 06/15/2018.  at 8w5d by Estimated Date of Delivery: Mar 22, 2019 based on LMP US confirms. Reviewed dating ultrasound. Pregnancy is planned.     Of note, Brie's first son was born at the [facility]. Overall she had a positive birth experience, however, her son was later admitted briefly to the NICU for TTN. She feels it would have been a smoother experience had he been born in hospital, which is why she is choosing to labor in the hospital this time.       Symptoms since LMP include nausea and vomiting, constipation, occasional cramping, insomnia.  Patient has tried these relief measures: Zofran, diet modification, vitamin B-6 and small frequent meals.     Caffeine intake: none.   Tobacco, EtOH, Drug use: none  R/f TORCH exposure: son in . Desires CMV testing.   Dietary restrictions: none    - Genetic/Infection questionnaire completed, risks include: none identified. Sister has VWD but Brie has tested negative.   Have you traveled during the pregnancy? No. Planning a trip to [place] in September.   Have your sexual partner(s) travelled during the pregnancy? No.     - Current Medications   Current Outpatient Prescriptions   Medication Sig Dispense Refill     ondansetron (ZOFRAN ODT) 4 MG ODT tab Take 1-2 tablets (4-8 mg) by mouth every 8 hours as needed for nausea 40 tablet 1     Prenatal Multivit-Min-Fe-FA (PRENATAL VITAMINS PO)            - Co-morbids   Past Medical History:   Diagnosis Date     Abnormal Pap smear     normals since     Anemia 2016    during last pregnancy     LSIL (low grade squamous intraepithelial lesion) on Pap smear 09     NO ACTIVE PROBLEMS      - Risk for GDM -  None identified.    - The patient does not h/o Pre Eclampsia, Current multi fetal gestation, Pre Gestational Diabetes (Type 1 or Type 2), chronic hypertension,  renal disease, Autoimmune disease (systematic lupus erythematosus, antiphospholipid syndrome) so WILL NOT start low dose aspirin (81mg) starting between 12 and 28 weeks to prevent preeclampsia.    - The patient  does not have a history of spontaneous  birth so  WILL NOT consider progesterone starting at 16-20 weeks and/or serial transvaginal cervical length ultrasounds from 16-24 weeks.         PERSONAL/SOCIAL HISTORY   to More, who she met in [facility] at the . He is from [place].  Lives with their spouse.  Employment: Full time.  Her job involves light activity .  Additional items: Denies past or present domestic violence, sexual and psychological abuse.    Objective  -VS: reviewed and within normal limits   -General appearance: no acute distress, patient is comfortable   NEUROLOGICAL/PSYCHIATRIC   - Orientated x3,   -Mood and affect: : normal     Assessment/Plan  Brie was seen today for prenatal care.    Diagnoses and all orders for this visit:    Supervision of other normal pregnancy, antepartum  -     ABO/Rh type and screen  -     Hepatitis B surface antigen  -     CBC with platelets  -     HIV Antigen Antibody Combo  -     Rubella Antibody IgG Quantitative  -     Treponema Abs w Reflex to RPR and Titer  -     Urine Culture Aerobic Bacterial  -     Vitamin D Deficiency  -     CMV Antibody IgG  -     CMV antibody IgM  -     Cancel: [facility] REFERRAL-PREGNANCY      33 year old  8w5d weeks of pregnancy with YANG of Mar 22, 2019 by LMP of Patient's last menstrual period was 06/15/2018.  Outpatient Encounter Prescriptions as of 8/15/2018   Medication Sig Dispense Refill     ondansetron (ZOFRAN ODT) 4 MG ODT tab Take 1-2 tablets (4-8 mg) by mouth every 8 hours as needed for nausea 40 tablet 1     Prenatal Multivit-Min-Fe-FA (PRENATAL VITAMINS PO)        [DISCONTINUED] norethindrone (MICRONOR) 0.35 MG per tablet Take 1 tablet (0.35 mg) by mouth daily (Patient not taking: Reported on  5/24/2018) 84 tablet 3     No facility-administered encounter medications on file as of 8/15/2018.       Orders Placed This Encounter   Procedures     Hepatitis B surface antigen     CBC with platelets     HIV Antigen Antibody Combo     Rubella Antibody IgG Quantitative     Treponema Abs w Reflex to RPR and Titer     Vitamin D Deficiency     CMV Antibody IgG     CMV antibody IgM     ABO/Rh type and screen       - Oriented to Practice, types of care, and how to reach a provider.  Pt prefers [facility] care.   - Patient received 1st trimester new OB education packet complete with aide of [facility] booklet including information on genetic screening test options.  - Patient would like first trimester screening, but would like to wait until the next visit to order (works at [employer], has not shared pregnancy with coworkers yet)  - Patient was encouraged to continue prenatal vitamins as tolerated.    - Patient was sent to lab for routine OB labs including CMV igG/IgM.     - CMV prevention discussed.  - Pregnancy concerns to be addressed by provider at new OB exam include: 1 order first trimester screen and level II, 2. Due for pap 3. Travel precautions for trip to [place] in September    Pt to RTO for NOB visit in 2 weeks and prn if questions or concerns    Lindsay Jean, SANCHEZ CNM

## 2018-08-16 DIAGNOSIS — Z34.80 SUPERVISION OF OTHER NORMAL PREGNANCY, ANTEPARTUM: ICD-10-CM

## 2018-08-16 LAB
ABO + RH BLD: NORMAL
ABO + RH BLD: NORMAL
BLD GP AB SCN SERPL QL: NORMAL
BLOOD BANK CMNT PATIENT-IMP: NORMAL
DEPRECATED CALCIDIOL+CALCIFEROL SERPL-MC: 35 UG/L (ref 20–75)
ERYTHROCYTE [DISTWIDTH] IN BLOOD BY AUTOMATED COUNT: 12 % (ref 10–15)
HBV SURFACE AG SERPL QL IA: NONREACTIVE
HCT VFR BLD AUTO: 34.5 % (ref 35–47)
HGB BLD-MCNC: 11.9 G/DL (ref 11.7–15.7)
HIV 1+2 AB+HIV1 P24 AG SERPL QL IA: NONREACTIVE
MCH RBC QN AUTO: 30.7 PG (ref 26.5–33)
MCHC RBC AUTO-ENTMCNC: 34.5 G/DL (ref 31.5–36.5)
MCV RBC AUTO: 89 FL (ref 78–100)
PLATELET # BLD AUTO: 224 10E9/L (ref 150–450)
RBC # BLD AUTO: 3.88 10E12/L (ref 3.8–5.2)
SPECIMEN EXP DATE BLD: NORMAL
WBC # BLD AUTO: 8 10E9/L (ref 4–11)

## 2018-08-16 PROCEDURE — 86850 RBC ANTIBODY SCREEN: CPT | Performed by: ADVANCED PRACTICE MIDWIFE

## 2018-08-16 PROCEDURE — 87340 HEPATITIS B SURFACE AG IA: CPT | Performed by: ADVANCED PRACTICE MIDWIFE

## 2018-08-16 PROCEDURE — 86762 RUBELLA ANTIBODY: CPT | Performed by: ADVANCED PRACTICE MIDWIFE

## 2018-08-16 PROCEDURE — 86901 BLOOD TYPING SEROLOGIC RH(D): CPT | Performed by: ADVANCED PRACTICE MIDWIFE

## 2018-08-16 PROCEDURE — 86644 CMV ANTIBODY: CPT | Performed by: ADVANCED PRACTICE MIDWIFE

## 2018-08-16 PROCEDURE — 36415 COLL VENOUS BLD VENIPUNCTURE: CPT | Performed by: ADVANCED PRACTICE MIDWIFE

## 2018-08-16 PROCEDURE — 86645 CMV ANTIBODY IGM: CPT | Performed by: ADVANCED PRACTICE MIDWIFE

## 2018-08-16 PROCEDURE — 85027 COMPLETE CBC AUTOMATED: CPT | Performed by: ADVANCED PRACTICE MIDWIFE

## 2018-08-16 PROCEDURE — 86780 TREPONEMA PALLIDUM: CPT | Performed by: ADVANCED PRACTICE MIDWIFE

## 2018-08-16 PROCEDURE — 87389 HIV-1 AG W/HIV-1&-2 AB AG IA: CPT | Performed by: ADVANCED PRACTICE MIDWIFE

## 2018-08-16 PROCEDURE — 87086 URINE CULTURE/COLONY COUNT: CPT | Performed by: ADVANCED PRACTICE MIDWIFE

## 2018-08-16 PROCEDURE — 86900 BLOOD TYPING SEROLOGIC ABO: CPT | Performed by: ADVANCED PRACTICE MIDWIFE

## 2018-08-16 PROCEDURE — 82306 VITAMIN D 25 HYDROXY: CPT | Performed by: ADVANCED PRACTICE MIDWIFE

## 2018-08-17 LAB
BACTERIA SPEC CULT: NORMAL
CMV IGG SERPL QL IA: >8 AI (ref 0–0.8)
CMV IGM SERPL QL IA: 0.7 AI (ref 0–0.8)
Lab: NORMAL
RUBV IGG SERPL IA-ACNC: 58 IU/ML
SPECIMEN SOURCE: NORMAL
T PALLIDUM AB SER QL: NONREACTIVE

## 2018-08-29 ENCOUNTER — OFFICE VISIT (OUTPATIENT)
Dept: OBGYN | Facility: CLINIC | Age: 33
End: 2018-08-29
Attending: ADVANCED PRACTICE MIDWIFE
Payer: COMMERCIAL

## 2018-08-29 VITALS
HEART RATE: 94 BPM | BODY MASS INDEX: 21.29 KG/M2 | WEIGHT: 140.5 LBS | DIASTOLIC BLOOD PRESSURE: 63 MMHG | SYSTOLIC BLOOD PRESSURE: 94 MMHG | HEIGHT: 68 IN

## 2018-08-29 DIAGNOSIS — R87.612 PAPANICOLAOU SMEAR OF CERVIX WITH LOW GRADE SQUAMOUS INTRAEPITHELIAL LESION (LGSIL): ICD-10-CM

## 2018-08-29 DIAGNOSIS — Z34.80 SUPERVISION OF OTHER NORMAL PREGNANCY, ANTEPARTUM: Primary | ICD-10-CM

## 2018-08-29 PROCEDURE — 87491 CHLMYD TRACH DNA AMP PROBE: CPT | Performed by: ADVANCED PRACTICE MIDWIFE

## 2018-08-29 PROCEDURE — 87591 N.GONORRHOEAE DNA AMP PROB: CPT | Performed by: ADVANCED PRACTICE MIDWIFE

## 2018-08-29 PROCEDURE — G0463 HOSPITAL OUTPT CLINIC VISIT: HCPCS | Mod: ZF

## 2018-08-29 ASSESSMENT — ANXIETY QUESTIONNAIRES
1. FEELING NERVOUS, ANXIOUS, OR ON EDGE: NOT AT ALL
5. BEING SO RESTLESS THAT IT IS HARD TO SIT STILL: NOT AT ALL
6. BECOMING EASILY ANNOYED OR IRRITABLE: NOT AT ALL
GAD7 TOTAL SCORE: 0
3. WORRYING TOO MUCH ABOUT DIFFERENT THINGS: NOT AT ALL
7. FEELING AFRAID AS IF SOMETHING AWFUL MIGHT HAPPEN: NOT AT ALL
2. NOT BEING ABLE TO STOP OR CONTROL WORRYING: NOT AT ALL

## 2018-08-29 ASSESSMENT — PATIENT HEALTH QUESTIONNAIRE - PHQ9: 5. POOR APPETITE OR OVEREATING: NOT AT ALL

## 2018-08-29 NOTE — PROGRESS NOTES
Subjective:  Brie is a 33 year old female who presents to clinic for a new OB visit.   at 10w5d with Estimated Date of Delivery: Mar 22, 2019 based on LMP. Feels well. Has started PNV.     She has not had bleeding since her LMP.   She has had mild nausea, but has been improving with Unisom and B6. Weight loss has occurred, for a total of 4 pounds.   This was a planned pregnancy.   FOB is involved.     OTHER CONCERNS: 1st trimester screening, nausea/vomiting    ===========================================   ROS: 10 point ROS neg other than the symptoms noted above in the HPI.    PSYCHIATRIC:  Denies mood changes  PHQ-9 score:    PHQ-9 SCORE 2018   Total Score 2     SOPHIE-7 SCORE 2018   Total Score 0     Past History:  Her past medical history   Past Medical History:   Diagnosis Date     Abnormal Pap smear     normals since     Anemia 2016    during last pregnancy     LSIL (low grade squamous intraepithelial lesion) on Pap smear 09     NO ACTIVE PROBLEMS      Her past pregnancies have been uncomplicated  Since her last LMP she denies use of alcohol, tobacco and street drugs.    HISTORY:  Family History   Problem Relation Age of Onset     Lipids Mother      Hypertension Father      Depression Father      Lipids Father      HEART DISEASE Paternal Grandfather      passsed away from MI     Prostate Cancer Paternal Grandfather      Cerebrovascular Disease Paternal Grandmother      Alcohol/Drug Paternal Grandmother      Arthritis Maternal Grandmother      Depression Sister      Social History     Marital status:      Spouse name: More     Number of children: 0     Years of education: N/A     Occupational History     Researcher [employer]     Social History Main Topics     Smoking status: Never Smoker     Smokeless tobacco: Never Used     Alcohol use No      Comment: socially     Drug use: No     Sexual activity: Yes     Partners: Male     Birth control/ protection:  None     Other Topics Concern     Not on file     Social History Narrative    ** Merged History Encounter **         ** Data from: 6/14/12 Enc Dept: [facility]    Social Documentation:        Balanced Diet: YES    Calcium intake: 3-5 per day    Caffeine: 2 per day    Exercise:  type of activity cardio;  4 times per week    Sunscreen: No    Seatbelts:  Yes    Self Breast Exam:  No    Self Testicular Exam: na    Physical/Emotional/Sexual Abuse: No    Do you feel safe in your environment? Yes        Cholesterol screen up to date: discuss    Eye Exam up to date: Yes    Dental Exam up to date: Yes    Pap smear up to date: last was 02/09, abnml had colposcopy and that was nml    Mammogram up to date: Does Not Apply    Dexa Scan up to date: Does Not Apply    Colonoscopy up to date: Does Not Apply    Immunizations up to date: Yes    Glucose screen if over 40:  na                         ** Data from: 9/8/16 Enc Dept: [facility]    Caffeine intake/servings daily - 1    Calcium intake/servings daily - 3    Exercise 5 times weekly - describe ; yoga, cardio, walks    Sunscreen used - Yes    Seatbelts used - Yes    Guns stored in the home - No    Self Breast Exam - Yes    Pap test up to date -  Yes        Eye exam up to date -  Yes    Dental exam up to date -  Yes    DEXA scan up to date -  No    Flex Sig/Colonoscopy up to date -  No    Mammography up to date -  No    Immunizations reviewed and up to date - Yes    Abuse: Current or Past (Physical, Sexual or Emotiona    l) - No    Do you feel safe in your environment - Yes    Do you cope well with stress - Yes    Do you suffer from insomnia - No    Last updated by: Eugenie Plunkett  8/3/2016         Current Outpatient Prescriptions   Medication Sig     doxylamine (UNISOM) 25 MG TABS tablet Take 25 mg by mouth At Bedtime     Prenatal Multivit-Min-Fe-FA (PRENATAL VITAMINS PO)      Pyridoxine HCl (VITAMIN B6 PO)      ondansetron (ZOFRAN ODT) 4 MG ODT tab Take 1-2  "tablets (4-8 mg) by mouth every 8 hours as needed for nausea (Patient not taking: Reported on 2018)     No current facility-administered medications for this visit.      Allergies   Allergen Reactions     Augmentin      High fever as a child     Seasonal Allergies      ============================================  MEDICAL HISTORY  Past Medical History:   Diagnosis Date     Abnormal Pap smear 2009    normals since     Anemia 2016    during last pregnancy     LSIL (low grade squamous intraepithelial lesion) on Pap smear 09     NO ACTIVE PROBLEMS      Past Surgical History:   Procedure Laterality Date     ENT SURGERY      Tonsils and adenoids removed when very young       Obstetric History       T1      L1     SAB1   TAB0   Ectopic0   Multiple0   Live Births1       # Outcome Date GA Lbr Max/2nd Weight Sex Delivery Anes PTL Lv   3 Current            2 Term 17 39w6d 06:30 / 00:40 3.629 kg (8 lb) M  None N LIA      Name: Sinan      Complications: GBS   1 SAB 16 11w0d    AB, COMPLETE         Obstetric Comments   Son went to NICU briefly for TTN, GBS pos. Small episiotomy for decels.    Still breastfeeding her son at bedtime   No hx DM, HTN, PTL         GYN History- History of abnormal Pap Smears; LSIL , normal since                        Cervical procedures: denies                        History of STI: denies    I personally reviewed the past social/family/medical and surgical history on the date of service.   I reviewed lab work done at Intake visit with patient.    EXAM:  BP 94/63 (BP Location: Left arm, Patient Position: Chair)  Pulse 94  Ht 1.727 m (5' 8\")  Wt 63.7 kg (140 lb 8 oz)  LMP 06/15/2018  BMI 21.36 kg/m2   EXAM:  GENERAL:  Pleasant pregnant female, alert, cooperative and well groomed.  SKIN:  Warm and dry, without lesions or rashes  HEAD: Symmetrical features.  MOUTH:  Buccal mucosa pink, moist without lesions.  Teeth in good repair.    NECK:  Thyroid " without enlargement and nodules.  Lymph nodes not palpable.   LUNGS:  Clear to auscultation.  BREAST:    No dominant, fixed or suspicious masses are noted.  No skin or nipple changes or axillary nodes.   Nipples everted.      HEART:  RRR without murmur.  ABDOMEN: Soft without masses , tenderness or organomegaly.  No CVA tenderness.  Uterus not palpable appropriate for dates.  No scars noted. Fetal heart tones present.  MUSCULOSKELETAL:  Full range of motion  EXTREMITIES:  No edema. No significant varicosities.   PELVIC EXAM: Deferred  WET PREP:Not done  GC/CHLAMYDIA CULTURE OBTAINED:YES     Assessment:   33 year old , 10w5d weeks of pregnancy with YANG of Mar 22, 2019 by LMP  Genetic Screening: First Trimester Screen    ICD-10-CM    1. Supervision of other normal pregnancy - [facility] NATALIE Z34.80 Gonorrhea PCR     Chlamydia PCR (Clinic Collect)     [facility] REFERRAL-PREGNANCY     Plan:  - Reviewed use of triage nurse line and contacting the on-call provider after hours for an urgent need such as fever, vagina bleeding, bladder or vaginal infection, rupture of membranes,  or term labor.    - Reviewed best evidence for: weight gain for her weight and height for pregnancy:  Based on pre-pregnancy weight of 63kg and Body mass index is 21.36 kg/(m^2). RECOMMENDED WEIGHT GAIN: 15-25 lbs.  - Reviewed healthy diet and foods to avoid; exercise and activity during pregnancy; avoiding exposure to toxoplasmosis; and maintenance of a generally healthy lifestyle.   - Discussed the harms, benefits, side effects and alternative therapies for current prescribed and OTC medications.  - All pt's questions discussed and answered.  Pt verbalized understanding of and agreement to plan of care.  - Plan pap postpartum   - Continue scheduled prenatal care and prn if questions or concerns, RTC in 4-6 weeks

## 2018-08-29 NOTE — MR AVS SNAPSHOT
After Visit Summary   8/29/2018    Brie Brown    MRN: [id number]           Patient Information     Date Of Birth          1985        Visit Information        Provider Department      8/29/2018 8:45 AM Bobby Pratt APRN CNM [facility]        Today's Diagnoses     Supervision of other normal pregnancy - S CNM    -  1    Papanicolaou smear of cervix with low grade squamous intraepithelial lesion (LGSIL)           Follow-ups after your visit        Additional Services     MAT FETAL MED CTR REFERRAL-PREGNANCY       There is no height or weight on file to calculate BMI.    >> Patient may proceed with recommendations for further testing as directed by the Maternal Fetal Medicine Specialist >>    >> If requesting Fetal Echo: MFM will determine appropriate location for exam due to indication.    >> If requesting Lung Maturity Amnio:  If results indicate fetal lung maturity, induction or C/S is recommended within 36 hours.  Please schedule accordingly.     Dear Patient:   Please be aware that coverage of these services is subject to the terms and limitations of your health insurance plan.  Call member services at your health plan with any benefit or coverage questions.      Please bring the following to your appointment:    >>  Any x-rays, CTs or MRIs which have been performed.  Contact the facility where they were done to arrange for  prior to your scheduled appointment.  Any new CT, MRI or other procedures ordered by your specialist must be performed at a [facility] facility or coordinated by your clinic's referral office.  >>  List of current medications   >>  This referral request   >>  Any documents/labs given to you for this referral                  Follow-up notes from your care team     Return in about 5 weeks (around 10/3/2018) for MARK Visit.      Who to contact     Please call your clinic at [phone] to:    Ask questions about your health    Make or cancel  "appointments    Discuss your medicines    Learn about your test results    Speak to your doctor            Additional Information About Your Visit        [employer] Resources Information     [employer] gives you secure access to your electronic health record. If you see a primary care provider, you can also send messages to your care team and make appointments. If you have questions, please call your primary care clinic.  If you do not have a primary care provider, please call [phone] and they will assist you.      [employer] is an electronic gateway that provides easy, online access to your medical records. With [employer], you can request a clinic appointment, read your test results, renew a prescription or communicate with your care team.     To access your existing account, please contact your [facility] or call [phone] for assistance.        Care EveryWhere ID     This is your Care EveryWhere ID. This could be used by other organizations to access your [facility] medical records  [id number]        Your Vitals Were     Pulse Height Last Period BMI (Body Mass Index)          94 1.727 m (5' 8\") 06/15/2018 21.36 kg/m2         Blood Pressure from Last 3 Encounters:   08/29/18 94/63   08/15/18 101/65   05/24/18 91/56    Weight from Last 3 Encounters:   08/29/18 63.7 kg (140 lb 8 oz)   08/15/18 63.8 kg (140 lb 11.2 oz)   05/24/18 66 kg (145 lb 9.6 oz)              We Performed the Following     Chlamydia PCR (Clinic Collect)     Gonorrhea PCR     MAT FETAL MED CTR REFERRAL-PREGNANCY        Primary Care Provider Office Phone # Fax #    Alanna Daniela Head [phone]       [facility] [facility] [address]        Equal Access to Services     [facility] : Rafaela Waller, sary pillai, moo kim. So [facility] [phone].    ATENCIÓN: Si habla español, tiene a angel disposición servicios gratuitos de asistencia " lingüística. Phil al [phone].    We comply with applicable federal civil rights laws and [place] laws. We do not discriminate on the basis of race, color, national origin, age, disability, sex, sexual orientation, or gender identity.            Thank you!     Thank you for choosing [facility]  for your care. Our goal is always to provide you with excellent care. Hearing back from our patients is one way we can continue to improve our services. Please take a few minutes to complete the written survey that you may receive in the mail after your visit with us. Thank you!             Your Updated Medication List - Protect others around you: Learn how to safely use, store and throw away your medicines at www.disposemymeds.org.          This list is accurate as of 8/29/18  9:16 AM.  Always use your most recent med list.                   Brand Name Dispense Instructions for use Diagnosis    doxylamine 25 MG Tabs tablet    UNISOM     Take 25 mg by mouth At Bedtime        ondansetron 4 MG ODT tab    ZOFRAN ODT    40 tablet    Take 1-2 tablets (4-8 mg) by mouth every 8 hours as needed for nausea    Nausea and vomiting in pregnancy       PRENATAL VITAMINS PO           VITAMIN B6 PO

## 2018-08-29 NOTE — LETTER
2018       RE: Brie Brown  [address]  [facility] [address]     Dear Colleague,    Thank you for referring your patient, Brie Brown, to the [facility] at [facility]. Please see a copy of my visit note below.    Subjective:  Brie is a 33 year old female who presents to clinic for a new OB visit.   at 10w5d with Estimated Date of Delivery: Mar 22, 2019 based on LMP. Feels well. Has started PNV.     She has not had bleeding since her LMP.   She has had mild nausea, but has been improving with Unisom and B6. Weight loss has occurred, for a total of 4 pounds.   This was a planned pregnancy.   FOB is involved.     OTHER CONCERNS: 1st trimester screening, nausea/vomiting    ===========================================   ROS: 10 point ROS neg other than the symptoms noted above in the HPI.    PSYCHIATRIC:  Denies mood changes  PHQ-9 score:    PHQ-9 SCORE 2018   Total Score 2     SOPHIE-7 SCORE 2018   Total Score 0     Past History:  Her past medical history   Past Medical History:   Diagnosis Date     Abnormal Pap smear 2009    normals since     Anemia 2016    during last pregnancy     LSIL (low grade squamous intraepithelial lesion) on Pap smear 09     NO ACTIVE PROBLEMS      Her past pregnancies have been uncomplicated  Since her last LMP she denies use of alcohol, tobacco and street drugs.    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[employer]     Social History Main Topics     Smoking status: Never Smoker     Smokeless tobacco: Never Used     Alcohol use No      Comment: socially     Drug use: No     Sexual activity: Yes     Partners: Male     Birth control/ protection: None     Other Topics Concern     Not on file     Social History Narrative    ** Merged History Encounter **         ** Data from: 6/14/12 Enc Dept: [facility]    Social Documentation:        Balanced Diet: YES    Calcium intake: 3-5 per day    Caffeine: 2 per day    Exercise:  type of activity cardio;  4 times per week    Sunscreen: No    Seatbelts:  Yes    Self Breast Exam:  No    Self Testicular Exam: na    Physical/Emotional/Sexual Abuse: No    Do you feel safe in your environment? Yes        Cholesterol screen up to date: discuss    Eye Exam up to date: Yes    Dental Exam up to date: Yes    Pap smear up to date: last was 02/09, abnml had colposcopy and that was nml    Mammogram up to date: Does Not Apply    Dexa Scan up to date: Does Not Apply    Colonoscopy up to date: Does Not Apply    Immunizations up to date: Yes    Glucose screen if over 40:  na                         ** Data from: 9/8/16 Enc Dept: [facility]    Caffeine intake/servings daily - 1    Calcium intake/servings daily - 3    Exercise 5 times weekly - describe ; yoga, cardio, walks    Sunscreen used - Yes    Seatbelts used - Yes    Guns stored in the home - No    Self Breast Exam - Yes    Pap test up to date -  Yes        Eye exam up to date -  Yes    Dental exam up to date -  Yes    DEXA scan up to date -  No    Flex Sig/Colonoscopy up to date -  No    Mammography up to date -  No    Immunizations reviewed and up to date - Yes    Abuse: Current or Past (Physical, Sexual or Emotiona    l) - No    Do you feel safe in your environment - Yes    Do you cope well with stress - Yes    Do you suffer from insomnia - No    Last updated by: Eugenie HOANG  "[email or web address]  8/3/2016         Current Outpatient Prescriptions   Medication Sig     doxylamine (UNISOM) 25 MG TABS tablet Take 25 mg by mouth At Bedtime     Prenatal Multivit-Min-Fe-FA (PRENATAL VITAMINS PO)      Pyridoxine HCl (VITAMIN B6 PO)      ondansetron (ZOFRAN ODT) 4 MG ODT tab Take 1-2 tablets (4-8 mg) by mouth every 8 hours as needed for nausea (Patient not taking: Reported on 2018)     No current facility-administered medications for this visit.      Allergies   Allergen Reactions     Augmentin      High fever as a child     Seasonal Allergies      ============================================  MEDICAL HISTORY  Past Medical History:   Diagnosis Date     Abnormal Pap smear     normals since     Anemia 2016    during last pregnancy     LSIL (low grade squamous intraepithelial lesion) on Pap smear 09     NO ACTIVE PROBLEMS      Past Surgical History:   Procedure Laterality Date     ENT SURGERY      Tonsils and adenoids removed when very young       Obstetric History       T1      L1     SAB1   TAB0   Ectopic0   Multiple0   Live Births1       # Outcome Date GA Lbr Max/2nd Weight Sex Delivery Anes PTL Lv   3 Current            2 Term 17 39w6d 06:30 / 00:40 3.629 kg (8 lb) M  None N LIA      Name: Sinan      Complications: GBS   1 SAB 16 11w0d    AB, COMPLETE         Obstetric Comments   Son went to NICU briefly for TTN, GBS pos. Small episiotomy for decels.    Still breastfeeding her son at bedtime   No hx DM, HTN, PTL         GYN History- History of abnormal Pap Smears; LSIL , normal since                        Cervical procedures: denies                        History of STI: denies    I personally reviewed the past social/family/medical and surgical history on the date of service.   I reviewed lab work done at Intake visit with patient.    EXAM:  BP 94/63 (BP Location: Left arm, Patient Position: Chair)  Pulse 94  Ht 1.727 m (5' 8\")  Wt 63.7 kg (140 lb " 8 oz)  LMP 06/15/2018  BMI 21.36 kg/m2   EXAM:  GENERAL:  Pleasant pregnant female, alert, cooperative and well groomed.  SKIN:  Warm and dry, without lesions or rashes  HEAD: Symmetrical features.  MOUTH:  Buccal mucosa pink, moist without lesions.  Teeth in good repair.    NECK:  Thyroid without enlargement and nodules.  Lymph nodes not palpable.   LUNGS:  Clear to auscultation.  BREAST:    No dominant, fixed or suspicious masses are noted.  No skin or nipple changes or axillary nodes.   Nipples everted.      HEART:  RRR without murmur.  ABDOMEN: Soft without masses , tenderness or organomegaly.  No CVA tenderness.  Uterus not palpable appropriate for dates.  No scars noted. Fetal heart tones present.  MUSCULOSKELETAL:  Full range of motion  EXTREMITIES:  No edema. No significant varicosities.   PELVIC EXAM: Deferred  WET PREP:Not done  GC/CHLAMYDIA CULTURE OBTAINED:YES     Assessment:   33 year old , 10w5d weeks of pregnancy with YANG of Mar 22, 2019 by LMP  Genetic Screening: First Trimester Screen    ICD-10-CM    1. Supervision of other normal pregnancy - [facility] NATALIE Z34.80 Gonorrhea PCR     Chlamydia PCR (Clinic Collect)     [facility] REFERRAL-PREGNANCY     Plan:  - Reviewed use of triage nurse line and contacting the on-call provider after hours for an urgent need such as fever, vagina bleeding, bladder or vaginal infection, rupture of membranes,  or term labor.    - Reviewed best evidence for: weight gain for her weight and height for pregnancy:  Based on pre-pregnancy weight of 63kg and Body mass index is 21.36 kg/(m^2). RECOMMENDED WEIGHT GAIN: 15-25 lbs.  - Reviewed healthy diet and foods to avoid; exercise and activity during pregnancy; avoiding exposure to toxoplasmosis; and maintenance of a generally healthy lifestyle.   - Discussed the harms, benefits, side effects and alternative therapies for current prescribed and OTC medications.  - All pt's questions discussed and answered.  Pt  verbalized understanding of and agreement to plan of care.  - Plan pap postpartum   - Continue scheduled prenatal care and prn if questions or concerns, RTC in 4-6 weeks    Again, thank you for allowing me to participate in the care of your patient.      Sincerely,    SANCHEZ Rodriguez CNM

## 2018-08-30 LAB
C TRACH DNA SPEC QL NAA+PROBE: NEGATIVE
N GONORRHOEA DNA SPEC QL NAA+PROBE: NEGATIVE
SPECIMEN SOURCE: NORMAL
SPECIMEN SOURCE: NORMAL

## 2018-08-30 ASSESSMENT — PATIENT HEALTH QUESTIONNAIRE - PHQ9: SUM OF ALL RESPONSES TO PHQ QUESTIONS 1-9: 2

## 2018-08-30 ASSESSMENT — ANXIETY QUESTIONNAIRES: GAD7 TOTAL SCORE: 0

## 2018-09-06 ENCOUNTER — PRE VISIT (OUTPATIENT)
Dept: MATERNAL FETAL MEDICINE | Facility: CLINIC | Age: 33
End: 2018-09-06

## 2018-09-07 ENCOUNTER — TELEPHONE (OUTPATIENT)
Dept: OBGYN | Facility: CLINIC | Age: 33
End: 2018-09-07

## 2018-09-07 NOTE — LETTER
September 7, 2018    To Whom It May Concern:    Brie Brown is being seen in our clinic for prenatal care.      Her Estimated Date of Delivery: Mar 22, 2019.          Sincerely,        Merced Zabala MD

## 2018-09-07 NOTE — TELEPHONE ENCOUNTER
----- Message from Jacinda Lynn sent at 9/7/2018  9:44 AM CDT -----  Regarding: Letter for  wait list  Contact: [phone]  Needs confirmation of pregnancy letter for  wait list.  ashley 3/22.  Brie works in [employer] and can p/u letter at our .

## 2018-09-11 ENCOUNTER — HOSPITAL ENCOUNTER (OUTPATIENT)
Dept: ULTRASOUND IMAGING | Facility: CLINIC | Age: 33
Discharge: HOME | End: 2018-09-11
Attending: ADVANCED PRACTICE MIDWIFE | Admitting: ADVANCED PRACTICE MIDWIFE
Payer: COMMERCIAL

## 2018-09-11 ENCOUNTER — OFFICE VISIT (OUTPATIENT)
Dept: MATERNAL FETAL MEDICINE | Facility: CLINIC | Age: 33
End: 2018-09-11
Attending: ADVANCED PRACTICE MIDWIFE
Payer: COMMERCIAL

## 2018-09-11 DIAGNOSIS — O26.90 PREGNANCY RELATED CONDITION, ANTEPARTUM: ICD-10-CM

## 2018-09-11 DIAGNOSIS — Z36.9 FIRST TRIMESTER SCREENING: Primary | ICD-10-CM

## 2018-09-11 DIAGNOSIS — Z36.9 FIRST TRIMESTER SCREENING: ICD-10-CM

## 2018-09-11 PROCEDURE — 84163 PAPPA SERUM: CPT | Performed by: OBSTETRICS & GYNECOLOGY

## 2018-09-11 PROCEDURE — 84704 HCG FREE BETACHAIN TEST: CPT | Performed by: OBSTETRICS & GYNECOLOGY

## 2018-09-11 PROCEDURE — 76813 OB US NUCHAL MEAS 1 GEST: CPT

## 2018-09-11 PROCEDURE — 82105 ALPHA-FETOPROTEIN SERUM: CPT | Performed by: OBSTETRICS & GYNECOLOGY

## 2018-09-11 PROCEDURE — 96040 ZZH GENETIC COUNSELING, EACH 30 MINUTES: CPT | Performed by: GENETIC COUNSELOR, MS

## 2018-09-11 PROCEDURE — 36415 COLL VENOUS BLD VENIPUNCTURE: CPT | Performed by: OBSTETRICS & GYNECOLOGY

## 2018-09-11 PROCEDURE — 40000072 ZZH STATISTIC GENETIC COUNSELING, < 16 MIN: Mod: ZF | Performed by: GENETIC COUNSELOR, MS

## 2018-09-11 NOTE — MR AVS SNAPSHOT
After Visit Summary   9/11/2018    Brie Brown    MRN: [id number]           Patient Information     Date Of Birth          1985        Visit Information        Provider Department      9/11/2018 11:00 AM Nini Isaac GC [facility] Maternal Fetal Medicine [facility]        Today's Diagnoses     First trimester screening    -  1    Pregnancy related condition, antepartum           Follow-ups after your visit        Your next 10 appointments already scheduled     Oct 03, 2018  8:45 AM CDT   RETURN OB with Yuni Gates CNM   [facility] ([facility])    [facility] [address]  [address]  [facility] [address]   [phone]            Oct 19, 2018  8:45 AM CDT   MFM US COMP with [id number]   [facility] Maternal Fetal Medicine Ultrasound - [place] ([facility])    [address]  [facility] [id number] [phone]           Wear comfortable clothes and leave your valuables at home.            Oct 19, 2018  9:15 AM CDT   Radiology MD with UR WILBUR WINTER   [facility] Maternal Fetal Medicine - [place] ([facility])    [address]  [facility] [id number] [phone]           Please arrive at the time given for your first appointment. This visit is used internally to schedule the physician's time during your ultrasound.              Future tests that were ordered for you today     Open Future Orders        Priority Expected Expires Ordered    First Trimester Screen Biochem Markers Routine  12/10/2018 9/11/2018            Who to contact     If you have questions or need follow up information about today's clinic visit or your schedule please contact [facility] MATERNAL FETAL MEDICINE [facility] directly at [phone].  Normal or non-critical lab and imaging results will be communicated to you by MyChart, letter or phone within 4 business  days after the clinic has received the results. If you do not hear from us within 7 days, please contact the clinic through [employer] or phone. If you have a critical or abnormal lab result, we will notify you by phone as soon as possible.  Submit refill requests through [employer] or call your pharmacy and they will forward the refill request to us. Please allow 3 business days for your refill to be completed.          Additional Information About Your Visit        Best [employer] [place] [employer] Information     [employer] gives you secure access to your electronic health record. If you see a primary care provider, you can also send messages to your care team and make appointments. If you have questions, please call your primary care clinic.  If you do not have a primary care provider, please call [phone] and they will assist you.        Care EveryWhere ID     This is your Care EveryWhere ID. This could be used by other organizations to access your [facility] medical records  [id number]        Your Vitals Were     Last Period                   06/15/2018            Blood Pressure from Last 3 Encounters:   08/29/18 94/63   08/15/18 101/65   05/24/18 91/56    Weight from Last 3 Encounters:   08/29/18 63.7 kg (140 lb 8 oz)   08/15/18 63.8 kg (140 lb 11.2 oz)   05/24/18 66 kg (145 lb 9.6 oz)              We Performed the Following     [facility] Genetic Counseling        Primary Care Provider Office Phone # Fax #    Alanna Head [phone] [address] [facility] [address]        Equal Access to Services     MARCUS SNYDER : Hadii loren zarcoo [place], waaxda luqadaha, qaybta kaalmada teri, moo ramírez . So [facility] [phone].    ATENCIÓN: Si habla español, tiene a angel disposición servicios gratuitos de asistencia lingüística. Llame al [phone].    We comply with applicable federal civil rights laws and [place] laws. We do not discriminate on the basis of race, color, national  origin, age, disability, sex, sexual orientation, or gender identity.            Thank you!     Thank you for choosing [facility]  for your care. Our goal is always to provide you with excellent care. Hearing back from our patients is one way we can continue to improve our services. Please take a few minutes to complete the written survey that you may receive in the mail after your visit with us. Thank you!             Your Updated Medication List - Protect others around you: Learn how to safely use, store and throw away your medicines at www.disposemymeds.org.          This list is accurate as of 9/11/18  1:23 PM.  Always use your most recent med list.                   Brand Name Dispense Instructions for use Diagnosis    doxylamine 25 MG Tabs tablet    UNISOM     Take 25 mg by mouth At Bedtime        ondansetron 4 MG ODT tab    ZOFRAN ODT    40 tablet    Take 1-2 tablets (4-8 mg) by mouth every 8 hours as needed for nausea    Nausea and vomiting in pregnancy       PRENATAL VITAMINS PO           VITAMIN B6 PO

## 2018-09-11 NOTE — PROGRESS NOTES
"[facility] Fetal Medicine [place]  Genetic Counseling Consult    Patient: Brie Brown YOB: 1985   Date of Service: 18      Brie Brown was seen with her , More, at [facility] Fetal [facility] for genetic consultation to discuss the options for routine screening for fetal chromosome abnormalities.       Impression/Plan:   1.  Brie had an ultrasound and blood draw for first trimester screening.  Results are expected within 3-5 days, and will be available in [place]Â®.  We will contact her to discuss the results, and a copy will be forwarded to the office of the referring OB provider. Brie [place] - and requested that I call her 's cell phone at [phone] to deliver results. Authorization to share PHI form was completed and is scanned under the \"Media\" tab.      2. Maternal serum AFP (single marker screen) is recommended after 15 weeks to screen for open neural tube defects. A quad screen should not be performed.    Pregnancy History:   /Parity:    Age at Delivery: 33 year old  YANG: 3/22/2019, by Last Menstrual Period  Gestational Age: 12w4d    No significant complications or exposures were reported in the current pregnancy.    Brie s pregnancy history is significant for one term vaginal delivery and one miscarriage at 11 weeks gestation.    Medical History:   Brie s reported medical history is not expected to impact pregnancy management or risks to fetal development.       Family History:   A three-generation pedigree was obtained, and is scanned under the  Media  tab.   The following significant findings were reported by Brie:    Brie reports that her sister and a paternal aunt have Von Willebrand disease. Brie was tested for this previously and does not have it. Therefore, this pregnancy is not at an increased risk for Von Willebrand disease.     Brie reports that her " father had a twin brother who passed away at six months of age due to complications from a congenital heart issue. More specific details are unavailable.     Brie and More report having multiple relatives with depression. We talked about how mental health conditions can cluster in families and follow a multifactorial inheritance pattern, with both genetic and environmental factors contributing to a person's risk for developing mental illness. We talked about protective factors such as healthy diet, regular exercise, sufficient sleep, and having a good support system and how these can help reduce a person's risk of developing symptoms. We also discussed that this type of family history is important to be aware of so individuals can be monitored for potential symptoms of mental health conditions.     More reports that his cousin's son has autism spectrum disorder. Some forms of autism spectrum disorders can be associated with specific genetic conditions, but most often is due to the combination of genes and environmental factors that can affect development.  Because there is a genetic component to autism spectrum disorders, the recurrence risk for other family members is higher among first-degree relatives of the individual with an autism spectrum disorder (full siblings), and decreases with distance in relationship to other second-degree relatives.We discussed that ~10% of ASD conditions have a definied genetic cause. Outside of those cases, multifactorial inheritance is typically observed.    Otherwise, the reported family history is negative for multiple miscarriages, stillbirths, birth defects, intellectual disability, known genetic conditions, and consanguinity.       Carrier Screening:   The patient reports that she is of  ancestry:      Cystic fibrosis is an autosomal recessive genetic condition that occurs with increased frequency in individuals of  ancestry and carrier screening for  this condition is available.  In addition,  screening in the [facility] includes cystic fibrosis.    The patient reports that the father of the pregnancy has  ancestry:      The hemoglobinopathies are a group of genetic blood diseases that occur with increased frequency in individuals of  ancestry and carrier screening for these conditions is available.  Carrier screening for the hemoglobinopathies includes a CBC with red blood cell indices, a ferritin level, and a quantitative hemoglobin electrophoresis or HPLC.  In addition,  screening in the [facility] includes many of the hemoglobinopathies.      Expanded carrier screening for mutations in a large panel of genes associated with autosomal recessive conditions including cystic fibrosis, spinal muscular atrophy, and others, is now available.      The patient has declined the carrier screening options reviewed today.       Risk Assessment for Chromosome Conditions:   We explained that the risk for fetal chromosome abnormalities increases with maternal age. We discussed specific features of common chromosome abnormalities, including Down syndrome, trisomy 13, trisomy 18, and sex chromosome trisomies.      - At age 33 at midtrimester, the risk to have a baby with Down syndrome is 1 in 597.    - At age 33 at midtrimester, the risk to have a baby with any chromosome abnormality is 1 in 299.          Testing Options:   We discussed the following options:   First trimester screening    First trimester ultrasound with nuchal translucency and nasal bone assessments, maternal plasma hCG, DALILA-A, and AFP measurement    Screens for fetal trisomy 21, trisomy 13, and trisomy 18    Cannot screen for open neural tube defects; maternal serum AFP after 15 weeks is recommended    We discussed that the following options are available if first trimester screening or ultrasound indicate an increased risk:   Non-invasive Prenatal Testing  (NIPT)    Maternal plasma cell-free DNA testing; first trimester ultrasound with nuchal translucency and nasal bone assessment is recommended, when appropriate    Screens for fetal trisomy 21, trisomy 13, trisomy 18, and sex chromosome aneuploidy    Cannot screen for open neural tube defects; maternal serum AFP after 15 weeks is recommended       Genetic Amniocentesis    Invasive procedure typically performed in the second trimester by which amniotic fluid is obtained for the purpose of chromosome analysis and/or other prenatal genetic analysis    Diagnostic results; >99% sensitivity for fetal chromosome abnormalities    AFAFP measurement tests for open neural tube defects    We reviewed the benefits and limitations of this testing.  Screening tests provide a risk assessment specific to the pregnancy for certain fetal chromosome abnormalities, but cannot definitively diagnose or exclude a fetal chromosome abnormality.  Follow-up genetic counseling and consideration of diagnostic testing is recommended with any abnormal screening result.      It was a pleasure to be involved with Brie deras care. Face-to-face time of the meeting was 40 minutes.    Nini Isaac [facility]  Certified Genetic Counselor  [phone]    Patient seen, evaluated and discussed with the Genetic Counseling Intern. I have verified the content of the note, which accurately reflects my assessment of the patient and the plan of care.  Supervising Genetic Counselor  Annamarie Murray MS, [facility]  Maternal Fetal Medicine  [facility]  Phone: [phone]  Email: [email or web address]

## 2018-09-11 NOTE — PROGRESS NOTES
"Please see \"Imaging\" tab under \"Chart Review\" for details of today's US.    Ryann Tony, DO    "

## 2018-09-11 NOTE — MR AVS SNAPSHOT
After Visit Summary   9/11/2018    Brie Brown    MRN: [id number]           Patient Information     Date Of Birth          1985        Visit Information        Provider Department      9/11/2018 12:15 PM Ryann Tony DO [facility] Maternal Fetal Medicine [facility]        Today's Diagnoses     First trimester screening    -  1       Follow-ups after your visit        Your next 10 appointments already scheduled     Oct 03, 2018  8:45 AM CDT   RETURN OB with Yuni Gates CNM   [facility] ([facility])    [place] [facility] [address]  [address]  [facility] [address]   [phone]            Oct 19, 2018  8:45 AM CDT   MFM US COMP with [id number]   [facility] Maternal Fetal Medicine Ultrasound - [place] ([facility])    [address]  [facility] [id number] [phone]           Wear comfortable clothes and leave your valuables at home.            Oct 19, 2018  9:15 AM CDT   Radiology MD with UR WILBUR WINTER   [facility] Maternal Fetal Medicine - [facility])    [address]  [facility] [id number] [phone]           Please arrive at the time given for your first appointment. This visit is used internally to schedule the physician's time during your ultrasound.              Future tests that were ordered for you today     Open Future Orders        Priority Expected Expires Ordered    First Trimester Screen Biochem Markers Routine  12/10/2018 9/11/2018            Who to contact     If you have questions or need follow up information about today's clinic visit or your schedule please contact [facility] MATERNAL FETAL MEDICINE [facility] directly at [phone].  Normal or non-critical lab and imaging results will be communicated to you by MyChart, letter or phone within 4 business days after the clinic has received the  results. If you do not hear from us within 7 days, please contact the clinic through [facility] or phone. If you have a critical or abnormal lab result, we will notify you by phone as soon as possible.  Submit refill requests through [facility] or call your pharmacy and they will forward the refill request to us. Please allow 3 business days for your refill to be completed.          Additional Information About Your Visit        [employer] Information     [facility] gives you secure access to your electronic health record. If you see a primary care provider, you can also send messages to your care team and make appointments. If you have questions, please call your primary care clinic.  If you do not have a primary care provider, please call [phone] and they will assist you.        Care EveryWhere ID     This is your Care EveryWhere ID. This could be used by other organizations to access your [place] medical records  [id number]        Your Vitals Were     Last Period                   06/15/2018            Blood Pressure from Last 3 Encounters:   08/29/18 94/63   08/15/18 101/65   05/24/18 91/56    Weight from Last 3 Encounters:   08/29/18 63.7 kg (140 lb 8 oz)   08/15/18 63.8 kg (140 lb 11.2 oz)   05/24/18 66 kg (145 lb 9.6 oz)              Today, you had the following     No orders found for display       Primary Care Provider Office Phone # Fax #    Alanna Daniela Head [phone] [address] [facility] [address]        Equal Access to Services     MARCUS SNYDER : Hadii aad ku hadasho Soomaali, waaxda luqadaha, qaybta kaalmada adeegyanuria, moo anderson. So [facility] [phone].    ATENCIÓN: Si habla español, tiene a angel disposición servicios gratuitos de asistencia lingüística. Llame al [phone].    We comply with applicable federal civil rights laws and [place] laws. We do not discriminate on the basis of race, color, national origin, age, disability, sex, sexual  orientation, or gender identity.            Thank you!     Thank you for choosing [facility]  for your care. Our goal is always to provide you with excellent care. Hearing back from our patients is one way we can continue to improve our services. Please take a few minutes to complete the written survey that you may receive in the mail after your visit with us. Thank you!             Your Updated Medication List - Protect others around you: Learn how to safely use, store and throw away your medicines at www.disposemymeds.org.          This list is accurate as of 9/11/18 12:35 PM.  Always use your most recent med list.                   Brand Name Dispense Instructions for use Diagnosis    doxylamine 25 MG Tabs tablet    UNISOM     Take 25 mg by mouth At Bedtime        ondansetron 4 MG ODT tab    ZOFRAN ODT    40 tablet    Take 1-2 tablets (4-8 mg) by mouth every 8 hours as needed for nausea    Nausea and vomiting in pregnancy       PRENATAL VITAMINS PO           VITAMIN B6 PO

## 2018-09-14 ENCOUNTER — TELEPHONE (OUTPATIENT)
Dept: MATERNAL FETAL MEDICINE | Facility: CLINIC | Age: 33
End: 2018-09-14

## 2018-09-14 NOTE — TELEPHONE ENCOUNTER
Left a message for Brie regarding her first trimester screening results. I left the message on her 's voicemail, per her request (signed release on file). Her  called me back and I reviewed the results.      These results indicated a 1 in >10,000 risk for Down syndrome and a 1 in >10,000 risk for trisomy 18/13.     The NT measurement was 1.9 mm, the nasal bone was present, the DALILA-A was 1.57 MoM and the free BhCG was 1.15 MoM.    This screening test gives information about the risk of some chromosome conditions in a pregnancy, but does not definitively diagnose or exclude the presence of these chromosome conditions.     A copy of these results are available in her EPIC chart for her primary OB to review.     Maternal serum AFP only is recommended in the second trimester to screen for neural tube defects.

## 2018-09-17 LAB — LAB SCANNED RESULT: NORMAL

## 2018-09-18 NOTE — TELEPHONE ENCOUNTER
See telephone encounter.    Reason for Disposition    Caller has URGENT medication question about med that PCP prescribed and triager unable to answer question    Protocols used: MEDICATION QUESTION CALL-ADULT-AH    
none

## 2018-10-03 ENCOUNTER — OFFICE VISIT (OUTPATIENT)
Dept: OBGYN | Facility: CLINIC | Age: 33
End: 2018-10-03
Attending: ADVANCED PRACTICE MIDWIFE
Payer: COMMERCIAL

## 2018-10-03 VITALS
BODY MASS INDEX: 21.95 KG/M2 | HEIGHT: 68 IN | WEIGHT: 144.8 LBS | DIASTOLIC BLOOD PRESSURE: 60 MMHG | SYSTOLIC BLOOD PRESSURE: 93 MMHG | HEART RATE: 88 BPM

## 2018-10-03 DIAGNOSIS — Z34.80 SUPERVISION OF OTHER NORMAL PREGNANCY, ANTEPARTUM: Primary | ICD-10-CM

## 2018-10-03 PROCEDURE — G0463 HOSPITAL OUTPT CLINIC VISIT: HCPCS | Mod: ZF

## 2018-10-03 ASSESSMENT — PAIN SCALES - GENERAL: PAINLEVEL: NO PAIN (0)

## 2018-10-03 NOTE — NURSING NOTE
Chief Complaint   Patient presents with     Prenatal Care     15 weeks 5 days      Medication Question     b-6 and unisom she is taking wants to make sure it is ok     Jennifer MELITON Larry on 10/3/2018 at 8:53 AM

## 2018-10-03 NOTE — LETTER
"10/3/2018       RE: Brie Brown  [address]  [facility] [address]     Dear Colleague,    Thank you for referring your patient, Brie Brown, to the [facility] at [facility]. Please see a copy of my visit note below.    Subjective:      33 year old  at 15w5d presents for a routine prenatal appointment.        Denies cramping/contractions, vaginal bleeding, discharge or leakage of fluid. Reports +fetal movement-\"thinks\" she feels some \"twinges\" :.  No HA, vision changes, ruq/epigastric pain.      Patient concerns: Feeling well. Has level 2 ultrasound scheduled- thinks it is a girl. Nausea is improving- used to take zofran qday-  Has not taken again since 11 weeks. Using unison and B6.    Objective:  Vitals:    10/03/18 0854   BP: 93/60   BP Location: Left arm   Patient Position: Sitting   Cuff Size: Adult Regular   Pulse: 88   Weight: 65.7 kg (144 lb 12.8 oz)   Height: 1.727 m (5' 8\")       See OB flowsheet    Assessment/Plan     Encounter Diagnosis   Name Primary?     Supervision of other normal pregnancy - S CNM Yes       - Reviewed total weight gain, encouraged continued healthy diet and exercise.      - Reviewed why/how to contact provider.    Patient education/orders or handouts today:  PTL signs/symptoms, Fetal movement and Level 2 u/s scheduled    - Reviewed 1st trimester screening.   - Level 2 ultrasound scheduled for 10/19/18.   Continue scheduled prenatal care, RTC in 4 weeks and prn if questions or concerns.      SANCHEZ Arshad, CNM         "

## 2018-10-03 NOTE — MR AVS SNAPSHOT
After Visit Summary   10/3/2018    Brie Brown    MRN: [id number]           Patient Information     Date Of Birth          1985        Visit Information        Provider Department      10/3/2018 8:45 AM Yuni Gates CNM [facility]        Today's Diagnoses     Supervision of other normal pregnancy - [facility] LARRYM    -  1       Follow-ups after your visit        Follow-up notes from your care team     Return in about 4 weeks (around 10/31/2018) for REINIER ESTRADA.      Your next 10 appointments already scheduled     Oct 19, 2018  8:45 AM CDT   WILBUR US COMP with [id number]   [facility]etal [facility] Ultrasound - [facility])    [address]  [facility] [address] [phone]           Wear comfortable clothes and leave your valuables at home.            Oct 19, 2018  9:15 AM CDT   Radiology MD with UR WILBUR WINTER   [facility] - [facility])    [address]  [facility] [id number] [phone]           Please arrive at the time given for your first appointment. This visit is used internally to schedule the physician's time during your ultrasound.            Nov 07, 2018  9:00 AM CST   RETURN OB with SANCHEZ Bolden CNM   [facility] ([facility])    [place] [facility] [address]  [address]  [address]  [facility] [address] [phone]              Who to contact     Please call your clinic at [phone] to:    Ask questions about your health    Make or cancel appointments    Discuss your medicines    Learn about your test results    Speak to your doctor            Additional Information About Your Visit        MyChart Information     [employer] gives you secure access to your electronic health record. If you see a primary care provider, you can also send messages to your care team  "and make appointments. If you have questions, please call your primary care clinic.  If you do not have a primary care provider, please call [phone] and they will assist you.      [employer] is an electronic gateway that provides easy, online access to your medical records. With [employer], you can request a clinic appointment, read your test results, renew a prescription or communicate with your care team.     To access your existing account, please contact your [facility] or call [phone] for assistance.        Care EveryWhere ID     This is your Care EveryWhere ID. This could be used by other organizations to access your [place] medical records  [id number]        Your Vitals Were     Pulse Height Last Period BMI (Body Mass Index)          88 1.727 m (5' 8\") 06/15/2018 22.02 kg/m2         Blood Pressure from Last 3 Encounters:   10/03/18 93/60   08/29/18 94/63   08/15/18 101/65    Weight from Last 3 Encounters:   10/03/18 65.7 kg (144 lb 12.8 oz)   08/29/18 63.7 kg (140 lb 8 oz)   08/15/18 63.8 kg (140 lb 11.2 oz)              Today, you had the following     No orders found for display       Primary Care Provider Office Phone # Fax #    Alanna Head [phone] [address] [facility] [address]        Equal Access to Services     MARCUS SNYDER : Hadii loren zarcoo [place], waaxda luqadaha, qaybta kaalmada teri, moo ramírez . So [facility] [phone].    ATENCIÓN: Si habla español, tiene a angel disposición servicios gratuitos de asistencia lingüística. Llame al [phone].    We comply with applicable federal civil rights laws and [place] laws. We do not discriminate on the basis of race, color, national origin, age, disability, sex, sexual orientation, or gender identity.            Thank you!     Thank you for choosing [facility]  for your care. Our goal is always to provide you with " excellent care. Hearing back from our patients is one way we can continue to improve our services. Please take a few minutes to complete the written survey that you may receive in the mail after your visit with us. Thank you!             Your Updated Medication List - Protect others around you: Learn how to safely use, store and throw away your medicines at www.disposemymeds.org.          This list is accurate as of 10/3/18 11:59 PM.  Always use your most recent med list.                   Brand Name Dispense Instructions for use Diagnosis    doxylamine 25 MG Tabs tablet    UNISOM     Take 25 mg by mouth At Bedtime        ondansetron 4 MG ODT tab    ZOFRAN ODT    40 tablet    Take 1-2 tablets (4-8 mg) by mouth every 8 hours as needed for nausea    Nausea and vomiting in pregnancy       PRENATAL VITAMINS PO           VITAMIN B6 PO

## 2018-10-03 NOTE — PROGRESS NOTES
"Subjective:      33 year old  at 15w5d presents for a routine prenatal appointment.        Denies cramping/contractions, vaginal bleeding, discharge or leakage of fluid. Reports +fetal movement-\"thinks\" she feels some \"twinges\" :.  No HA, vision changes, ruq/epigastric pain.      Patient concerns: Feeling well. Has level 2 ultrasound scheduled- thinks it is a girl. Nausea is improving- used to take zofran qday-  Has not taken again since 11 weeks. Using unison and B6.    Objective:  Vitals:    10/03/18 0854   BP: 93/60   BP Location: Left arm   Patient Position: Sitting   Cuff Size: Adult Regular   Pulse: 88   Weight: 65.7 kg (144 lb 12.8 oz)   Height: 1.727 m (5' 8\")       See OB flowsheet    Assessment/Plan     Encounter Diagnosis   Name Primary?     Supervision of other normal pregnancy - [facility] CNM Yes       - Reviewed total weight gain, encouraged continued healthy diet and exercise.      - Reviewed why/how to contact provider.    Patient education/orders or handouts today:  PTL signs/symptoms, Fetal movement and Level 2 u/s scheduled    - Reviewed 1st trimester screening.   - Level 2 ultrasound scheduled for 10/19/18.   Continue scheduled prenatal care, RTC in 4 weeks and prn if questions or concerns.      SANCHEZ Arshad, NATALIE                 "

## 2018-10-19 ENCOUNTER — OFFICE VISIT (OUTPATIENT)
Dept: MATERNAL FETAL MEDICINE | Facility: CLINIC | Age: 33
End: 2018-10-19
Attending: ADVANCED PRACTICE MIDWIFE
Payer: COMMERCIAL

## 2018-10-19 ENCOUNTER — HOSPITAL ENCOUNTER (OUTPATIENT)
Dept: ULTRASOUND IMAGING | Facility: CLINIC | Age: 33
Discharge: HOME | End: 2018-10-19
Attending: ADVANCED PRACTICE MIDWIFE | Admitting: OBSTETRICS & GYNECOLOGY
Payer: COMMERCIAL

## 2018-10-19 DIAGNOSIS — O26.90 PREGNANCY RELATED CONDITION, ANTEPARTUM: ICD-10-CM

## 2018-10-19 DIAGNOSIS — Z36.89 ENCOUNTER FOR FETAL ANATOMIC SURVEY: Primary | ICD-10-CM

## 2018-10-19 PROCEDURE — 76811 OB US DETAILED SNGL FETUS: CPT

## 2018-10-19 NOTE — MR AVS SNAPSHOT
After Visit Summary   10/19/2018    Brie Brown    MRN: [id number]           Patient Information     Date Of Birth          1985        Visit Information        Provider Department      10/19/2018 9:15 AM Jack Antunez MD [facility] Maternal Fetal Medicine [facility]        Today's Diagnoses     Encounter for fetal anatomic survey    -  1       Follow-ups after your visit        Your next 10 appointments already scheduled     Nov 07, 2018  9:00 AM CST   RETURN OB with SANCHEZ Bolden CNM   [facility] ([facility])    [place] [facility] [address]  [address]  [address]  [facility] [address] [phone]              Who to contact     If you have questions or need follow up information about today's clinic visit or your schedule please contact [facility]IC[facility] directly at [phone].  Normal or non-critical lab and imaging results will be communicated to you by MyChart, letter or phone within 4 business days after the clinic has received the results. If you do not hear from us within 7 days, please contact the clinic through [employer] or phone. If you have a critical or abnormal lab result, we will notify you by phone as soon as possible.  Submit refill requests through [employer] or call your pharmacy and they will forward the refill request to us. Please allow 3 business days for your refill to be completed.          Additional Information About Your Visit        MyChart Information     [employer] gives you secure access to your electronic health record. If you see a primary care provider, you can also send messages to your care team and make appointments. If you have questions, please call your primary care clinic.  If you do not have a primary care provider, please call [phone] and they will assist you.        Care EveryWhere ID     This is your Care EveryWhere ID. This could be used by other organizations to  access your [place] medical records  [id number]        Your Vitals Were     Last Period                   06/15/2018            Blood Pressure from Last 3 Encounters:   10/03/18 93/60   08/29/18 94/63   08/15/18 101/65    Weight from Last 3 Encounters:   10/03/18 65.7 kg (144 lb 12.8 oz)   08/29/18 63.7 kg (140 lb 8 oz)   08/15/18 63.8 kg (140 lb 11.2 oz)              Today, you had the following     No orders found for display       Primary Care Provider Office Phone # Fax #    Alanna Daniela Head [phone] [address] [facility] [address]        Equal Access to Services     MARCUS SNYDER : Rafaela Waller, wanu pillai, qaybta kaalmada teri, moo anderson. So [facility] [phone].    ATENCIÓN: Si habla español, tiene a angel disposición servicios gratuitos de asistencia lingüística. Llame al [phone].    We comply with applicable federal civil rights laws and [place] laws. We do not discriminate on the basis of race, color, national origin, age, disability, sex, sexual orientation, or gender identity.            Thank you!     Thank you for choosing [facility]  for your care. Our goal is always to provide you with excellent care. Hearing back from our patients is one way we can continue to improve our services. Please take a few minutes to complete the written survey that you may receive in the mail after your visit with us. Thank you!             Your Updated Medication List - Protect others around you: Learn how to safely use, store and throw away your medicines at www.disposemymeds.org.          This list is accurate as of 10/19/18  9:52 AM.  Always use your most recent med list.                   Brand Name Dispense Instructions for use Diagnosis    doxylamine 25 MG Tabs tablet    UNISOM     Take 25 mg by mouth At Bedtime        ondansetron 4 MG ODT tab    ZOFRAN ODT    40 tablet    Take 1-2 tablets  (4-8 mg) by mouth every 8 hours as needed for nausea    Nausea and vomiting in pregnancy       PRENATAL VITAMINS PO           VITAMIN B6 PO

## 2018-10-19 NOTE — PROGRESS NOTES
"Please see \"Imaging\" tab under \"Chart Review\" for details of today's US at the [facility].    Jack Antunez MD  Maternal-Fetal Medicine      "

## 2018-11-07 ENCOUNTER — OFFICE VISIT (OUTPATIENT)
Dept: OBGYN | Facility: CLINIC | Age: 33
End: 2018-11-07
Attending: ADVANCED PRACTICE MIDWIFE
Payer: COMMERCIAL

## 2018-11-07 VITALS — SYSTOLIC BLOOD PRESSURE: 115 MMHG | WEIGHT: 145.5 LBS | BODY MASS INDEX: 22.12 KG/M2 | DIASTOLIC BLOOD PRESSURE: 74 MMHG

## 2018-11-07 DIAGNOSIS — Z34.80 SUPERVISION OF OTHER NORMAL PREGNANCY, ANTEPARTUM: Primary | ICD-10-CM

## 2018-11-07 ASSESSMENT — PAIN SCALES - GENERAL: PAINLEVEL: NO PAIN (0)

## 2018-11-07 NOTE — NURSING NOTE
Chief Complaint   Patient presents with     Prenatal Care   20.5 weeks ob visit had level 2 ultraosund it's a girl

## 2018-11-07 NOTE — PROGRESS NOTES
"Subjective:     33 year old  at 20w5d presents for a routine prenatal appointment.      Denies vaginal bleeding or leakage of fluid.  Denies contractions or cramping. + fetal movement.       No HA, visual changes, RUQ or epigastric pain.   The patient presents with the following concerns:    - weight gain- decreased appetite and feels nauseous/vomiting occasional after eating,    - has had cold/sinuses x 1.5 weeks, starting to improve  Level II US  Results reviewed normal scan, EIF. Having a girl!    Objective:  Vitals:    18 0905   BP: 115/74   Pulse: (P) 94   Weight: 66 kg (145 lb 8 oz)   Height: (P) 1.727 m (5' 8\")   See OB flowsheet    Assessment/Plan:  Encounter Diagnosis   Name Primary?     Supervision of other normal pregnancy - S CNM Yes     - Reviewed overall weight gain (5 lbs) from initial visit. Discussed smaller, frequent meals or snacking, ensure if needed. Continue to monitor.    - Reviewed why/how to contact provider.   - Patient education/orders or handouts today: PTL signs/symptoms and Fetal movement   - Return to clinic in 4 weeks and prn if questions or concerns.   "

## 2018-11-07 NOTE — MR AVS SNAPSHOT
After Visit Summary   11/7/2018    Brie Brown    MRN: [id number]           Patient Information     Date Of Birth          1985        Visit Information        Provider Department      11/7/2018 9:00 AM Bobby Pratt APRN CNM [facility]        Today's Diagnoses     Supervision of other normal pregnancy - S CNM    -  1       Follow-ups after your visit        Follow-up notes from your care team     Return in about 4 weeks (around 12/5/2018) for MARK Visit.      Your next 10 appointments already scheduled     Dec 06, 2018  9:00 AM CST   RETURN OB with SANCHEZ Jacobo CNM   [facility] ([facility])    [facility] [address]  [address]  [facility] [address] [phone]              Who to contact     Please call your clinic at [phone] to:    Ask questions about your health    Make or cancel appointments    Discuss your medicines    Learn about your test results    Speak to your doctor            Additional Information About Your Visit        MyChart Information     [employer]aTrace software gives you secure access to your electronic health record. If you see a primary care provider, you can also send messages to your care team and make appointments. If you have questions, please call your primary care clinic.  If you do not have a primary care provider, please call [phone] and they will assist you.      dynaTrace software is an electronic gateway that provides easy, online access to your medical records. With dynaTrace software, you can request a clinic appointment, read your test results, renew a prescription or communicate with your care team.     To access your existing account, please contact your [facility] or call [phone] for assistance.        Care EveryWhere ID     This is your Care EveryWhere ID. This could be used by other organizations to access your [facility]  records  [id number]        Your Vitals Were     Last Period BMI (Body Mass Index)                06/15/2018 22.12 kg/m2           Blood Pressure from Last 3 Encounters:   11/07/18 115/74   10/03/18 93/60   08/29/18 94/63    Weight from Last 3 Encounters:   11/07/18 66 kg (145 lb 8 oz)   10/03/18 65.7 kg (144 lb 12.8 oz)   08/29/18 63.7 kg (140 lb 8 oz)              Today, you had the following     No orders found for display         Today's Medication Changes          These changes are accurate as of 11/7/18  9:22 AM.  If you have any questions, ask your nurse or doctor.               Stop taking these medicines if you haven't already. Please contact your care team if you have questions.     ondansetron 4 MG ODT tab   Commonly known as:  ZOFRAN ODT   Stopped by:  Bobby Pratt APRN CNM                    Primary Care Provider Office Phone # Fax #    Alannayessica Head [phone] [address] [address]        Equal Access to Services     [facility]: Hadii loren silva hadasho [place], waaxda luqadaha, qaybta kaalmanuria williamson, moo ramírez . So [facility] [phone].    ATENCIÓN: Si habla español, tiene a angel disposición servicios gratuitos de asistencia lingüística. [facility] [phone].    We comply with applicable federal civil rights laws and [place] laws. We do not discriminate on the basis of race, color, national origin, age, disability, sex, sexual orientation, or gender identity.            Thank you!     Thank you for choosing [facility]  for your care. Our goal is always to provide you with excellent care. Hearing back from our patients is one way we can continue to improve our services. Please take a few minutes to complete the written survey that you may receive in the mail after your visit with us. Thank you!             Your Updated Medication List - Protect others around you: Learn how to safely use, store and  throw away your medicines at www.disposemymeds.org.          This list is accurate as of 11/7/18  9:22 AM.  Always use your most recent med list.                   Brand Name Dispense Instructions for use Diagnosis    doxylamine 25 MG Tabs tablet    UNISOM     Take 25 mg by mouth At Bedtime        PRENATAL VITAMINS PO           VITAMIN B6 PO

## 2018-11-07 NOTE — LETTER
"2018       RE: Brie Brown  [address]  [facility] [address]     Dear Colleague,    Thank you for referring your patient, Brie Brown, to the [facility] at [facility]. Please see a copy of my visit note below.    Subjective:     33 year old  at 20w5d presents for a routine prenatal appointment.      Denies vaginal bleeding or leakage of fluid.  Denies contractions or cramping. + fetal movement.       No HA, visual changes, RUQ or epigastric pain.   The patient presents with the following concerns:    - weight gain- decreased appetite and feels nauseous/vomiting occasional after eating,    - has had cold/sinuses x 1.5 weeks, starting to improve  Level II US  Results reviewed normal scan, EIF. Having a girl!    Objective:  Vitals:    18 0905   BP: 115/74   Pulse: (P) 94   Weight: 66 kg (145 lb 8 oz)   Height: (P) 1.727 m (5' 8\")   See OB flowsheet    Assessment/Plan:  Encounter Diagnosis   Name Primary?     Supervision of other normal pregnancy - S CNM Yes     - Reviewed overall weight gain (5 lbs) from initial visit. Discussed smaller, frequent meals or snacking, ensure if needed. Continue to monitor.    - Reviewed why/how to contact provider.   - Patient education/orders or handouts today: PTL signs/symptoms and Fetal movement   - Return to clinic in 4 weeks and prn if questions or concerns.     Again, thank you for allowing me to participate in the care of your patient.      Sincerely,    SANCHEZ Rodriguez CNM      "

## 2018-11-14 ENCOUNTER — TELEPHONE (OUTPATIENT)
Dept: OBGYN | Facility: CLINIC | Age: 33
End: 2018-11-14

## 2018-11-14 DIAGNOSIS — J01.90 ACUTE SINUSITIS WITH SYMPTOMS > 10 DAYS: Primary | ICD-10-CM

## 2018-11-14 RX ORDER — AZITHROMYCIN 250 MG/1
TABLET, FILM COATED ORAL
Qty: 6 TABLET | Refills: 0 | Status: SHIPPED | OUTPATIENT
Start: 2018-11-14 | End: 2018-12-06

## 2018-11-14 NOTE — TELEPHONE ENCOUNTER
Received call from Brie stating she was in clinic a few weeks ago and had a sinus cold and was told that if it didn't get better to call for an antibiotic.    She reports sinus pressure that started 10/29 and is causing soreness in her teeth, cheeks, and eyes. She reports her eyes are swollen and watery and crusty but not red. She has productive cough with green sputum. Temp is 99.0.    Spoke with on-call midwife, Matthew Stafford, who ordered a z-pack.

## 2018-12-06 ENCOUNTER — OFFICE VISIT (OUTPATIENT)
Dept: OBGYN | Facility: CLINIC | Age: 33
End: 2018-12-06
Attending: ADVANCED PRACTICE MIDWIFE
Payer: COMMERCIAL

## 2018-12-06 VITALS
WEIGHT: 154 LBS | HEIGHT: 68 IN | HEART RATE: 91 BPM | SYSTOLIC BLOOD PRESSURE: 99 MMHG | DIASTOLIC BLOOD PRESSURE: 64 MMHG | BODY MASS INDEX: 23.34 KG/M2

## 2018-12-06 DIAGNOSIS — Z34.80 SUPERVISION OF OTHER NORMAL PREGNANCY, ANTEPARTUM: Primary | ICD-10-CM

## 2018-12-06 PROBLEM — J01.90 ACUTE SINUSITIS WITH SYMPTOMS > 10 DAYS: Status: RESOLVED | Noted: 2018-11-14 | Resolved: 2018-12-06

## 2018-12-06 PROCEDURE — G0463 HOSPITAL OUTPT CLINIC VISIT: HCPCS | Mod: ZF

## 2018-12-06 ASSESSMENT — PAIN SCALES - GENERAL: PAINLEVEL: NO PAIN (0)

## 2018-12-06 NOTE — PROGRESS NOTES
"Subjective:      33 year old  at 24w6d presents for a routine prenatal appointment.       No vaginal bleeding or leakage of fluid.  No contractions or cramping.    Daily fetal movement.       No HA, visual changes, RUQ or epigastric pain.   The patient presents with the following concerns: Feeling well overall. Continues to take unisom and B6 a night, but nausea has improved. Last vomited the Friday after Thanksgiving. Continues to have food aversions/preferences. Going to prenatal yoga weekly at [facility]. Sinus infection symptoms improved with antibiotics. Interested in waterbirth - will review consent and do Hep C at EOB.     Objective:  Vitals:    18 0900   BP: 99/64   Pulse: 91   Weight: 69.9 kg (154 lb)   Height: 1.727 m (5' 8\")     See OB flowsheet    Assessment/Plan     Encounter Diagnosis   Name Primary?     Supervision of other normal pregnancy - S CNM Yes     - Reviewed total weight gain, encouraged continued healthy diet and exercise.      - Reviewed why/how to contact provider.    Patient education/orders or handouts today:  PTL signs/symptoms and Plan for EOB visit w labs   Return to clinic in 3 weeks and prn if questions or concerns.     SANCHEZ PughM                "

## 2018-12-06 NOTE — MR AVS SNAPSHOT
"              After Visit Summary   12/6/2018    Brie Brown    MRN: [id number]           Patient Information     Date Of Birth          1985        Visit Information        Provider Department      12/6/2018 9:00 AM Lindsay Jean APRN CNM [facility]        Today's Diagnoses     Supervision of other normal pregnancy - S CNM    -  1       Follow-ups after your visit        Follow-up notes from your care team     Return in about 3 weeks (around 12/27/2018) for EOB.      Who to contact     Please call your clinic at [phone] to:    Ask questions about your health    Make or cancel appointments    Discuss your medicines    Learn about your test results    Speak to your doctor            Additional Information About Your Visit        [employer] Information     [employer] gives you secure access to your electronic health record. If you see a primary care provider, you can also send messages to your care team and make appointments. If you have questions, please call your primary care clinic.  If you do not have a primary care provider, please call [phone] and they will assist you.      [employer] is an electronic gateway that provides easy, online access to your medical records. With [employer], you can request a clinic appointment, read your test results, renew a prescription or communicate with your care team.     To access your existing account, please contact your [facility] or call [phone] for assistance.        Care EveryWhere ID     This is your Care EveryWhere ID. This could be used by other organizations to access your [place] medical records  [id number]        Your Vitals Were     Pulse Height Last Period Breastfeeding? BMI (Body Mass Index)       91 1.727 m (5' 8\") 06/15/2018 No 23.42 kg/m2        Blood Pressure from Last 3 Encounters:   12/06/18 99/64   11/07/18 115/74   10/03/18 93/60    Weight from Last 3 Encounters:   12/06/18 69.9 kg " (154 lb)   11/07/18 66 kg (145 lb 8 oz)   10/03/18 65.7 kg (144 lb 12.8 oz)              Today, you had the following     No orders found for display       Primary Care Provider Office Phone # Fax #    Alanna Head [phone] [address] [facility] [address]        Equal Access to Services     [facility]: Hadii aad ku hadasho [place]ali, waaxda luqadaha, qaybta kaalmada adeegyada, waxay idiin hayaan adeeg kharash la'aan ah. So [facility] [phone].    ATENCIÓN: Si habla español, tiene a angel disposición servicios gratuitos de asistencia lingüística. Rasame al [phone].    We comply with applicable federal civil rights laws and [place] laws. We do not discriminate on the basis of race, color, national origin, age, disability, sex, sexual orientation, or gender identity.            Thank you!     Thank you for choosing [facility]  for your care. Our goal is always to provide you with excellent care. Hearing back from our patients is one way we can continue to improve our services. Please take a few minutes to complete the written survey that you may receive in the mail after your visit with us. Thank you!             Your Updated Medication List - Protect others around you: Learn how to safely use, store and throw away your medicines at www.disposemymeds.org.          This list is accurate as of 12/6/18  1:47 PM.  Always use your most recent med list.                   Brand Name Dispense Instructions for use Diagnosis    doxylamine 25 MG Tabs tablet    UNISOM     Take 25 mg by mouth At Bedtime        PRENATAL VITAMINS PO           VITAMIN B6 PO

## 2018-12-06 NOTE — LETTER
"2018       RE: Brie Brown  [address]  [place] [address]     Dear Colleague,    Thank you for referring your patient, Brie Brown, to the [facility] at [facility]. Please see a copy of my visit note below.    Subjective:      33 year old  at 24w6d presents for a routine prenatal appointment.       No vaginal bleeding or leakage of fluid.  No contractions or cramping.    Daily fetal movement.       No HA, visual changes, RUQ or epigastric pain.   The patient presents with the following concerns: Feeling well overall. Continues to take unisom and B6 a night, but nausea has improved. Last vomited the Friday after Thanksgiving. Continues to have food aversions/preferences. Going to prenatal yoga weekly at [facility]. Sinus infection symptoms improved with antibiotics. Interested in waterbirth - will review consent and do Hep C at EOB.     Objective:  Vitals:    18 0900   BP: 99/64   Pulse: 91   Weight: 69.9 kg (154 lb)   Height: 1.727 m (5' 8\")     See OB flowsheet    Assessment/Plan     Encounter Diagnosis   Name Primary?     Supervision of other normal pregnancy - [facility] CNM Yes     - Reviewed total weight gain, encouraged continued healthy diet and exercise.      - Reviewed why/how to contact provider.    Patient education/orders or handouts today:  PTL signs/symptoms and Plan for EOB visit w labs   Return to clinic in 3 weeks and prn if questions or concerns.     SANCHEZ Pugh CNM                      "

## 2018-12-28 ENCOUNTER — OFFICE VISIT (OUTPATIENT)
Dept: OBGYN | Facility: CLINIC | Age: 33
End: 2018-12-28
Attending: ADVANCED PRACTICE MIDWIFE
Payer: COMMERCIAL

## 2018-12-28 VITALS
BODY MASS INDEX: 23.34 KG/M2 | WEIGHT: 153.5 LBS | HEART RATE: 102 BPM | SYSTOLIC BLOOD PRESSURE: 104 MMHG | DIASTOLIC BLOOD PRESSURE: 69 MMHG

## 2018-12-28 DIAGNOSIS — Z23 NEED FOR DIPHTHERIA-TETANUS-PERTUSSIS (TDAP) VACCINE: ICD-10-CM

## 2018-12-28 DIAGNOSIS — D50.8 IRON DEFICIENCY ANEMIA SECONDARY TO INADEQUATE DIETARY IRON INTAKE: ICD-10-CM

## 2018-12-28 DIAGNOSIS — Z34.80 SUPERVISION OF OTHER NORMAL PREGNANCY, ANTEPARTUM: Primary | ICD-10-CM

## 2018-12-28 DIAGNOSIS — D50.0 IRON DEFICIENCY ANEMIA DUE TO CHRONIC BLOOD LOSS: ICD-10-CM

## 2018-12-28 LAB
DEPRECATED CALCIDIOL+CALCIFEROL SERPL-MC: 45 UG/L (ref 20–75)
ERYTHROCYTE [DISTWIDTH] IN BLOOD BY AUTOMATED COUNT: 12.5 % (ref 10–15)
GLUCOSE 1H P 50 G GLC PO SERPL-MCNC: 82 MG/DL (ref 60–129)
HCT VFR BLD AUTO: 31.6 % (ref 35–47)
HCV AB SERPL QL IA: NONREACTIVE
HGB BLD-MCNC: 10.4 G/DL (ref 11.7–15.7)
MCH RBC QN AUTO: 30.1 PG (ref 26.5–33)
MCHC RBC AUTO-ENTMCNC: 32.9 G/DL (ref 31.5–36.5)
MCV RBC AUTO: 92 FL (ref 78–100)
PLATELET # BLD AUTO: 196 10E9/L (ref 150–450)
RBC # BLD AUTO: 3.45 10E12/L (ref 3.8–5.2)
T PALLIDUM AB SER QL: NONREACTIVE
WBC # BLD AUTO: 8.6 10E9/L (ref 4–11)

## 2018-12-28 PROCEDURE — 90715 TDAP VACCINE 7 YRS/> IM: CPT | Mod: ZF

## 2018-12-28 PROCEDURE — 85027 COMPLETE CBC AUTOMATED: CPT | Performed by: MIDWIFE

## 2018-12-28 PROCEDURE — 36415 COLL VENOUS BLD VENIPUNCTURE: CPT | Performed by: MIDWIFE

## 2018-12-28 PROCEDURE — 82306 VITAMIN D 25 HYDROXY: CPT | Performed by: MIDWIFE

## 2018-12-28 PROCEDURE — G0463 HOSPITAL OUTPT CLINIC VISIT: HCPCS | Mod: ZF

## 2018-12-28 PROCEDURE — 25000128 H RX IP 250 OP 636: Mod: ZF

## 2018-12-28 PROCEDURE — 82950 GLUCOSE TEST: CPT | Performed by: MIDWIFE

## 2018-12-28 PROCEDURE — 90471 IMMUNIZATION ADMIN: CPT | Mod: ZF

## 2018-12-28 PROCEDURE — 86803 HEPATITIS C AB TEST: CPT | Performed by: MIDWIFE

## 2018-12-28 PROCEDURE — 86780 TREPONEMA PALLIDUM: CPT | Performed by: MIDWIFE

## 2018-12-28 RX ORDER — FERROUS SULFATE 325(65) MG
325 TABLET, DELAYED RELEASE (ENTERIC COATED) ORAL DAILY
Qty: 90 TABLET | Refills: 3 | Status: SHIPPED | OUTPATIENT
Start: 2018-12-28 | End: 2019-08-01

## 2018-12-28 RX ORDER — LANOLIN ALCOHOL/MO/W.PET/CERES
1000 CREAM (GRAM) TOPICAL DAILY
Qty: 90 TABLET | Refills: 3 | Status: SHIPPED | OUTPATIENT
Start: 2018-12-28 | End: 2019-08-01

## 2018-12-28 RX ORDER — ASCORBIC ACID 250 MG
250 TABLET,CHEWABLE ORAL DAILY
Qty: 90 TABLET | Refills: 0 | Status: SHIPPED | OUTPATIENT
Start: 2018-12-28 | End: 2019-08-01

## 2018-12-28 ASSESSMENT — ANXIETY QUESTIONNAIRES
7. FEELING AFRAID AS IF SOMETHING AWFUL MIGHT HAPPEN: NOT AT ALL
5. BEING SO RESTLESS THAT IT IS HARD TO SIT STILL: NOT AT ALL
2. NOT BEING ABLE TO STOP OR CONTROL WORRYING: NOT AT ALL
6. BECOMING EASILY ANNOYED OR IRRITABLE: SEVERAL DAYS
3. WORRYING TOO MUCH ABOUT DIFFERENT THINGS: NOT AT ALL
1. FEELING NERVOUS, ANXIOUS, OR ON EDGE: NOT AT ALL
GAD7 TOTAL SCORE: 1

## 2018-12-28 ASSESSMENT — PATIENT HEALTH QUESTIONNAIRE - PHQ9
5. POOR APPETITE OR OVEREATING: NOT AT ALL
SUM OF ALL RESPONSES TO PHQ QUESTIONS 1-9: 0

## 2018-12-28 ASSESSMENT — PAIN SCALES - GENERAL: PAINLEVEL: NO PAIN (0)

## 2018-12-28 NOTE — PROGRESS NOTES
33 year old, , 28w0d,   The patient presents with the following concerns:  [place] birth at [place]  [place]   PHQ-9 SCORE 2018   PHQ-9 Total Score 2     Education completed today includes breast feeding, [facility] hand out , contraception, counting movements, signs of pre-term labor, post partum depression, GBS, getting enough iron and labor induction.  Birth preferences reviewed: Un-Medicated and Water birth  Labor support:    Possibly      Feeding plans :    Contraception planned:  Amenorrheic x 14 mos after first considering IUD not at 6 wk pp.   The following labs were ordered today:       GCT, CBC w platelets, Vitamin D, Hep C and Anti-treponema  Water birth consent form was given.  Blood type:   ABO   Date Value Ref Range Status   2018 A  Final     RH(D)   Date Value Ref Range Status   2018 Pos  Final     Antibody Screen   Date Value Ref Range Status   2018 Neg  Final    Rhogam  was not given.  TDAP  Was given.  A/P:  Encounter Diagnosis   Name Primary?     Need for diphtheria-tetanus-pertussis (Tdap) vaccine Yes     Orders Placed This Encounter   Procedures     TDAP VACCINE (BOOSTRIX)       Continue scheduled prenatal care  SANCHEZ Turner CNM

## 2018-12-28 NOTE — LETTER
2018       RE: Brie Brown  [address]  [place] [address]     Dear Colleague,    Thank you for referring your patient, Brie Brown, to the [facility] at [facility]. Please see a copy of my visit note below.     33 year old, , 28w0d,   The patient presents with the following concerns:  [place] birth [place]   PHQ-9 SCORE 2018   PHQ-9 Total Score 2     Education completed today includes breast feeding, [facility] hand out , contraception, counting movements, signs of pre-term labor, post partum depression, GBS, getting enough iron and labor induction.  Birth preferences reviewed: Un-Medicated and Water birth  Labor support:    Possibly     [place] Feeding plans :    Contraception planned:  Amenorrheic x 14 mos after first considering IUD not at 6 wk pp.   The following labs were ordered today:       GCT, CBC w platelets, Vitamin D, Hep C and Anti-treponema  Water birth consent form was given.  Blood type:   ABO   Date Value Ref Range Status   2018 A  Final     RH(D)   Date Value Ref Range Status   2018 Pos  Final     Antibody Screen   Date Value Ref Range Status   2018 Neg  Final    Rhogam  was not given.  TDAP  Was given.  A/P:  Encounter Diagnosis   Name Primary?     Need for diphtheria-tetanus-pertussis (Tdap) vaccine Yes     Orders Placed This Encounter   Procedures     TDAP VACCINE (BOOSTRIX)     Continue scheduled prenatal care      SANCHEZ Turner CNM

## 2018-12-28 NOTE — NURSING NOTE
Chief Complaint   Patient presents with     Prenatal Care     28 weeks 0 days      Health Maintenance Due   Topic Date Due     PAP Q2 YEARS  02/01/2018     INFLUENZA VACCINE (1) 09/01/2018     MATERNAL SCREENING  09/28/2018     OBGCT (OB)  11/30/2018     REPEAT ANTIBODY SCREEN (OB)  12/28/2018     RH IMMUNE GLOBULIN (OB)  12/28/2018   patient agreed to tdap and has already received flu vaccine.   Jennifer Larry CMA on 12/28/2018 at 9:05 AM

## 2018-12-29 ASSESSMENT — ANXIETY QUESTIONNAIRES: GAD7 TOTAL SCORE: 1

## 2019-01-14 ENCOUNTER — MYC MEDICAL ADVICE (OUTPATIENT)
Dept: OBGYN | Facility: CLINIC | Age: 34
End: 2019-01-14

## 2019-01-15 RX ORDER — BREAST PUMP
1 EACH MISCELLANEOUS DAILY
Qty: 1 EACH | Refills: 0 | Status: SHIPPED | OUTPATIENT
Start: 2019-01-15 | End: 2020-06-22

## 2019-01-15 NOTE — TELEPHONE ENCOUNTER
Received [employer] message from patient requesting prescription for breast pump to be faxed to [facility] at [phone].    Order placed and signed by provider and faxed.    Patient notified via [employer].

## 2019-01-25 ENCOUNTER — OFFICE VISIT (OUTPATIENT)
Dept: OBGYN | Facility: CLINIC | Age: 34
End: 2019-01-25
Attending: ADVANCED PRACTICE MIDWIFE
Payer: COMMERCIAL

## 2019-01-25 VITALS
HEART RATE: 91 BPM | SYSTOLIC BLOOD PRESSURE: 113 MMHG | BODY MASS INDEX: 24.11 KG/M2 | HEIGHT: 68 IN | WEIGHT: 159.1 LBS | DIASTOLIC BLOOD PRESSURE: 69 MMHG

## 2019-01-25 DIAGNOSIS — N94.9 ROUND LIGAMENT PAIN: ICD-10-CM

## 2019-01-25 DIAGNOSIS — Z34.80 SUPERVISION OF OTHER NORMAL PREGNANCY, ANTEPARTUM: Primary | ICD-10-CM

## 2019-01-25 LAB
ALBUMIN UR-MCNC: NEGATIVE MG/DL
APPEARANCE UR: CLEAR
BILIRUB UR QL STRIP: NEGATIVE
COLOR UR AUTO: YELLOW
GLUCOSE UR STRIP-MCNC: NEGATIVE MG/DL
HGB UR QL STRIP: ABNORMAL
KETONES UR STRIP-MCNC: NEGATIVE MG/DL
LEUKOCYTE ESTERASE UR QL STRIP: ABNORMAL
NITRATE UR QL: NEGATIVE
PH UR STRIP: 7 PH (ref 5–7)
SP GR UR STRIP: 1.01 (ref 1–1.03)
UROBILINOGEN UR STRIP-ACNC: 0.2 EU/DL (ref 0.2–1)

## 2019-01-25 PROCEDURE — G0463 HOSPITAL OUTPT CLINIC VISIT: HCPCS | Mod: ZF

## 2019-01-25 PROCEDURE — 87086 URINE CULTURE/COLONY COUNT: CPT | Performed by: ADVANCED PRACTICE MIDWIFE

## 2019-01-25 PROCEDURE — 81003 URINALYSIS AUTO W/O SCOPE: CPT

## 2019-01-25 RX ORDER — POLYETHYLENE GLYCOL 3350 17 G/17G
1 POWDER, FOR SOLUTION ORAL DAILY
COMMUNITY
End: 2019-08-01

## 2019-01-25 ASSESSMENT — MIFFLIN-ST. JEOR: SCORE: 1485.17

## 2019-01-25 NOTE — LETTER
"  RE: Brie Brown  [address] [facility] [address]     Dear Colleague,    Thank you for referring your patient, Brie Brown, to the [facility] at [facility]. Please see a copy of my visit note below.    Subjective:      33 year old  at 32w0d presentst for a routine prenatal appointment.     Denies cramping/contractions, vaginal bleeding, discharge or leakage of fluid. Reports +fetal movement.  No HA, vision changes, ruq/epigastric pain.      Patient concerns: Feeling well overall. Signed water birth consent at last visit; hep c NR.   Has been taking iron, vitamin C and B12. Skipped iron the last 2 days because she has been feeling constipated. Last BM yesterday but was small and had to strain a little. Started taking miralax; may add colace as well. Also has felt the baby is a little lower- feeling constant bladder pressure. No dysuria but agreeable to UA/UC today. Open to receiving maternity belt prescription.    Objective:  Vitals:    19 1125   BP: 113/69   BP Location: Left arm   Patient Position: Chair   Pulse: 91   Weight: 72.2 kg (159 lb 1.6 oz)   Height: 1.727 m (5' 8\")     See ob flowsheet    UA POCT:  Color Urine Yellow     Final 2019  1:00 PM    Appearance Urine Clear     Final 2019  1:00 PM    Glucose Urine Negative   NEG^Negative mg/dL Final 2019  1:00 PM    Bilirubin Urine Negative   NEG^Negative  Final 2019  1:00 PM    Ketones Urine Negative   NEG^Negative mg/dL Final 2019  1:00 PM    Specific Gravity Urine 1.015   1.003 - 1.035  Final 2019  1:00 PM    Blood Urine Trace Abnormal   A  NEG^Negative  Final 2019  1:00 PM    pH Urine 7.0   5.0 - 7.0 pH Final 2019  1:00 PM    Protein Albumin Urine Negative   NEG^Negative mg/dL Final 2019  1:00 PM    Urobilinogen Urine 0.2   0.2 - 1.0 EU/dL Final 2019  1:00 PM    Nitrite Urine " Negative   NEG^Negative  Final 01/25/2019  1:00 PM 1952   Leukocyte Esterase Urine Small Abnormal               Assessment/Plan     Encounter Diagnosis   Name Primary?     Supervision of other normal pregnancy - [facility] CNM Yes     Orders Placed This Encounter   Procedures     Clinitek Urine Macroscopic Nursing POCT     Clinitek Urine Macroscopic POCT     Orders Placed This Encounter   Medications     polyethylene glycol (MIRALAX/GLYCOLAX) packet     Sig: Take 1 packet by mouth daily     order for DME     Sig: Maternity Belt order     Dispense:  1 each     Refill:  0       - Reviewed total weight gain, encouraged continued healthy diet and exercise.  .  Reviewed importance of daily fetal kick count and why/how to contact provider.    - Reviewed why/how to contact provider if headache/visual changes/RUQ or epigastric pain, decreased fetal movement, vaginal bleeding, leakage of fluid or more than 4 contractions in an hour.     Patient education/orders or handouts today:  PTL signs/symptoms    Reviewed EOB labs.  Continue iron, vitamin c and b12 supplementation d/t hemoglobin of 10.4.   Will recheck cbc with plts at 36 weeks.  Reviewed relief measures for constipation, including increased fiber, hydration, activity. May continue miralax; may use colace.  UA POCT neg for infection. Urine culture sent to lab.  Maternity belt prescription sent.     Continue scheduled prenatal care, RTC in 2 weeks and prn if questions or concerns.      SANCHEZ Arshad, CNM

## 2019-01-25 NOTE — PROGRESS NOTES
"Subjective:      33 year old  at 32w0d presentst for a routine prenatal appointment.     Denies cramping/contractions, vaginal bleeding, discharge or leakage of fluid. Reports +fetal movement.  No HA, vision changes, ruq/epigastric pain.      Patient concerns: Feeling well overall. Signed water birth consent at last visit; hep c NR.   Has been taking iron, vitamin C and B12. Skipped iron the last 2 days because she has been feeling constipated. Last BM yesterday but was small and had to strain a little. Started taking miralax; may add colace as well. Also has felt the baby is a little lower- feeling constant bladder pressure. No dysuria but agreeable to UA/UC today. Open to receiving maternity belt prescription.    Objective:  Vitals:    19 1125   BP: 113/69   BP Location: Left arm   Patient Position: Chair   Pulse: 91   Weight: 72.2 kg (159 lb 1.6 oz)   Height: 1.727 m (5' 8\")     See ob flowsheet    UA POCT:  Color Urine Yellow     Final 2019  1:00 PM    Appearance Urine Clear     Final 2019  1:00 PM    Glucose Urine Negative   NEG^Negative mg/dL Final 2019  1:00 PM    Bilirubin Urine Negative   NEG^Negative  Final 2019  1:00 PM    Ketones Urine Negative   NEG^Negative mg/dL Final 2019  1:00 PM    Specific Gravity Urine 1.015   1.003 - 1.035  Final 2019  1:00 PM    Blood Urine Trace Abnormal   A  NEG^Negative  Final 2019  1:00 PM    pH Urine 7.0   5.0 - 7.0 pH Final 2019  1:00 PM    Protein Albumin Urine Negative   NEG^Negative mg/dL Final 2019  1:00 PM    Urobilinogen Urine 0.2   0.2 - 1.0 EU/dL Final 2019  1:00 PM    Nitrite Urine Negative   NEG^Negative  Final 2019  1:00 PM    Leukocyte Esterase Urine Small Abnormal               Assessment/Plan     Encounter Diagnosis   Name Primary?     Supervision of other normal pregnancy - S CNM Yes     Orders Placed This Encounter   Procedures     " Clinitek Urine Macroscopic Nursing POCT     Clinitek Urine Macroscopic POCT     Orders Placed This Encounter   Medications     polyethylene glycol (MIRALAX/GLYCOLAX) packet     Sig: Take 1 packet by mouth daily     order for DME     Sig: Maternity Belt order     Dispense:  1 each     Refill:  0       - Reviewed total weight gain, encouraged continued healthy diet and exercise.  .  Reviewed importance of daily fetal kick count and why/how to contact provider.    - Reviewed why/how to contact provider if headache/visual changes/RUQ or epigastric pain, decreased fetal movement, vaginal bleeding, leakage of fluid or more than 4 contractions in an hour.     Patient education/orders or handouts today:  PTL signs/symptoms    Reviewed EOB labs.  Continue iron, vitamin c and b12 supplementation d/t hemoglobin of 10.4.   Will recheck cbc with plts at 36 weeks.  Reviewed relief measures for constipation, including increased fiber, hydration, activity. May continue miralax; may use colace.  UA POCT neg for infection. Urine culture sent to lab.  Maternity belt prescription sent.     Continue scheduled prenatal care, RTC in 2 weeks and prn if questions or concerns.      SANCHEZ Arshad, LARRYM

## 2019-01-26 LAB
BACTERIA SPEC CULT: NO GROWTH
Lab: NORMAL
SPECIMEN SOURCE: NORMAL

## 2019-02-07 ENCOUNTER — OFFICE VISIT (OUTPATIENT)
Dept: OBGYN | Facility: CLINIC | Age: 34
End: 2019-02-07
Attending: ADVANCED PRACTICE MIDWIFE
Payer: COMMERCIAL

## 2019-02-07 VITALS
DIASTOLIC BLOOD PRESSURE: 74 MMHG | HEIGHT: 68 IN | WEIGHT: 161.6 LBS | BODY MASS INDEX: 24.49 KG/M2 | SYSTOLIC BLOOD PRESSURE: 115 MMHG | HEART RATE: 91 BPM

## 2019-02-07 DIAGNOSIS — Z34.80 SUPERVISION OF OTHER NORMAL PREGNANCY, ANTEPARTUM: Primary | ICD-10-CM

## 2019-02-07 PROCEDURE — G0463 HOSPITAL OUTPT CLINIC VISIT: HCPCS | Mod: ZF

## 2019-02-07 ASSESSMENT — MIFFLIN-ST. JEOR: SCORE: 1496.51

## 2019-02-07 ASSESSMENT — PAIN SCALES - GENERAL: PAINLEVEL: NO PAIN (0)

## 2019-02-07 NOTE — LETTER
"2019       RE: Brie Brown  [address]  [facility] [address]     Dear Colleague,    Thank you for referring your patient, Brie Brown, to the [facility] at [facility]. Please see a copy of my visit note below.    Subjective:      33 year old  at 33w5d presents for a routine prenatal appointment.    No vaginal bleeding or leakage of fluid.  No contractions. Normal daily fetal movement.       No HA, visual changes, RUQ or epigastric pain.   Patient concerns:   Feeling well overall. Had one episode of decreased movement last night, but then felt lots of normal movement upon waking in the night. Normal FM today. Inquires about routine pp pitocin. Planning 14 weeks maternity leave.   Reviewed EOB labs with patient.  Reviewed TDAP Previously given 18    Objective:  Vitals:    19 1447   BP: 115/74   Pulse: 91   Weight: 73.3 kg (161 lb 9.6 oz)   Height: 1.727 m (5' 8\")   See ob flowsheet  Assessment/Plan     Encounter Diagnosis   Name Primary?     Supervision of other normal pregnancy - [facility] CNM Yes       ABO   Date Value Ref Range Status   2018 A  Final     RH(D)   Date Value Ref Range Status   2018 Pos  Final     Antibody Screen   Date Value Ref Range Status   2018 Neg  Final   Rhogam  was notgiven.    - Reviewed why/how to contact provider if headache/visual changes/RUQ or epigastric pain, decreased fetal movement, vaginal bleeding, leakage of fluid or more than 4 contractions in an hour.    Reviewed GBS screening at 35-36 wks.    Return to clinic in 2 weeks and prn if questions or concerns.     SANCHEZ Pugh CNM        "

## 2019-02-07 NOTE — PROGRESS NOTES
"Subjective:      33 year old  at 33w5d presents for a routine prenatal appointment.    No vaginal bleeding or leakage of fluid.  No contractions. Normal daily fetal movement.       No HA, visual changes, RUQ or epigastric pain.   Patient concerns:   Feeling well overall. Had one episode of decreased movement last night, but then felt lots of normal movement upon waking in the night. Normal FM today. Inquires about routine pp pitocin. Planning 14 weeks maternity leave.   Reviewed EOB labs with patient.  Reviewed TDAP Previously given 18    Objective:  Vitals:    19 1447   BP: 115/74   Pulse: 91   Weight: 73.3 kg (161 lb 9.6 oz)   Height: 1.727 m (5' 8\")   See ob flowsheet  Assessment/Plan     Encounter Diagnosis   Name Primary?     Supervision of other normal pregnancy - [facility] CNM Yes       ABO   Date Value Ref Range Status   2018 A  Final     RH(D)   Date Value Ref Range Status   2018 Pos  Final     Antibody Screen   Date Value Ref Range Status   2018 Neg  Final   Rhogam  was notgiven.    - Reviewed why/how to contact provider if headache/visual changes/RUQ or epigastric pain, decreased fetal movement, vaginal bleeding, leakage of fluid or more than 4 contractions in an hour.    Reviewed GBS screening at 35-36 wks.    Return to clinic in 2 weeks and prn if questions or concerns.     SANCHEZ Pugh CNM      "

## 2019-02-22 ENCOUNTER — HOSPITAL ENCOUNTER (OUTPATIENT)
Facility: CLINIC | Age: 34
Discharge: HOME | End: 2019-02-23
Attending: ADVANCED PRACTICE MIDWIFE | Admitting: ADVANCED PRACTICE MIDWIFE
Payer: COMMERCIAL

## 2019-02-22 ENCOUNTER — TELEPHONE (OUTPATIENT)
Dept: OBGYN | Facility: CLINIC | Age: 34
End: 2019-02-22

## 2019-02-22 PROBLEM — O47.00 PRETERM CONTRACTIONS: Status: ACTIVE | Noted: 2019-02-22

## 2019-02-22 LAB
ABO + RH BLD: NORMAL
ABO + RH BLD: NORMAL
ALBUMIN UR-MCNC: NEGATIVE MG/DL
AMPHETAMINES UR QL SCN: NEGATIVE
APPEARANCE UR: CLEAR
BILIRUB UR QL STRIP: NEGATIVE
BLD GP AB SCN SERPL QL: NORMAL
BLOOD BANK CMNT PATIENT-IMP: NORMAL
CANNABINOIDS UR QL: NEGATIVE
COCAINE UR QL: NEGATIVE
COLOR UR AUTO: ABNORMAL
ERYTHROCYTE [DISTWIDTH] IN BLOOD BY AUTOMATED COUNT: 12.9 % (ref 10–15)
GLUCOSE UR STRIP-MCNC: NEGATIVE MG/DL
HCT VFR BLD AUTO: 30.6 % (ref 35–47)
HGB BLD-MCNC: 10.9 G/DL (ref 11.7–15.7)
HGB UR QL STRIP: NEGATIVE
KETONES UR STRIP-MCNC: 5 MG/DL
LEUKOCYTE ESTERASE UR QL STRIP: NEGATIVE
MCH RBC QN AUTO: 30.3 PG (ref 26.5–33)
MCHC RBC AUTO-ENTMCNC: 35.6 G/DL (ref 31.5–36.5)
MCV RBC AUTO: 85 FL (ref 78–100)
NITRATE UR QL: NEGATIVE
OPIATES UR QL SCN: NEGATIVE
PCP UR QL SCN: NEGATIVE
PH UR STRIP: 7 PH (ref 5–7)
PLATELET # BLD AUTO: 134 10E9/L (ref 150–450)
RBC # BLD AUTO: 3.6 10E12/L (ref 3.8–5.2)
SOURCE: ABNORMAL
SP GR UR STRIP: 1.01 (ref 1–1.03)
SPECIMEN EXP DATE BLD: NORMAL
SPECIMEN SOURCE: ABNORMAL
UROBILINOGEN UR STRIP-MCNC: NORMAL MG/DL (ref 0–2)
WBC # BLD AUTO: 10.3 10E9/L (ref 4–11)
WET PREP SPEC: ABNORMAL

## 2019-02-22 PROCEDURE — 80307 DRUG TEST PRSMV CHEM ANLYZR: CPT | Performed by: ADVANCED PRACTICE MIDWIFE

## 2019-02-22 PROCEDURE — 87210 SMEAR WET MOUNT SALINE/INK: CPT | Performed by: ADVANCED PRACTICE MIDWIFE

## 2019-02-22 PROCEDURE — 36415 COLL VENOUS BLD VENIPUNCTURE: CPT | Performed by: ADVANCED PRACTICE MIDWIFE

## 2019-02-22 PROCEDURE — 0064U ANTB TP TOTAL&RPR IA QUAL: CPT | Performed by: ADVANCED PRACTICE MIDWIFE

## 2019-02-22 PROCEDURE — 86901 BLOOD TYPING SEROLOGIC RH(D): CPT | Performed by: ADVANCED PRACTICE MIDWIFE

## 2019-02-22 PROCEDURE — 87591 N.GONORRHOEAE DNA AMP PROB: CPT | Performed by: ADVANCED PRACTICE MIDWIFE

## 2019-02-22 PROCEDURE — 86900 BLOOD TYPING SEROLOGIC ABO: CPT | Performed by: ADVANCED PRACTICE MIDWIFE

## 2019-02-22 PROCEDURE — 59025 FETAL NON-STRESS TEST: CPT

## 2019-02-22 PROCEDURE — 86850 RBC ANTIBODY SCREEN: CPT | Performed by: ADVANCED PRACTICE MIDWIFE

## 2019-02-22 PROCEDURE — 81003 URINALYSIS AUTO W/O SCOPE: CPT | Performed by: ADVANCED PRACTICE MIDWIFE

## 2019-02-22 PROCEDURE — 87653 STREP B DNA AMP PROBE: CPT | Performed by: ADVANCED PRACTICE MIDWIFE

## 2019-02-22 PROCEDURE — 87186 SC STD MICRODIL/AGAR DIL: CPT | Performed by: ADVANCED PRACTICE MIDWIFE

## 2019-02-22 PROCEDURE — 25000128 H RX IP 250 OP 636: Performed by: ADVANCED PRACTICE MIDWIFE

## 2019-02-22 PROCEDURE — G0463 HOSPITAL OUTPT CLINIC VISIT: HCPCS | Mod: 25

## 2019-02-22 PROCEDURE — 85027 COMPLETE CBC AUTOMATED: CPT | Performed by: ADVANCED PRACTICE MIDWIFE

## 2019-02-22 PROCEDURE — 87491 CHLMYD TRACH DNA AMP PROBE: CPT | Performed by: ADVANCED PRACTICE MIDWIFE

## 2019-02-22 RX ORDER — BETAMETHASONE SODIUM PHOSPHATE AND BETAMETHASONE ACETATE 3; 3 MG/ML; MG/ML
12 INJECTION, SUSPENSION INTRA-ARTICULAR; INTRALESIONAL; INTRAMUSCULAR; SOFT TISSUE EVERY 24 HOURS
Status: DISCONTINUED | OUTPATIENT
Start: 2019-02-22 | End: 2019-02-23 | Stop reason: HOSPADM

## 2019-02-22 RX ORDER — ACETAMINOPHEN 325 MG/1
650-975 TABLET ORAL EVERY 4 HOURS PRN
Status: DISCONTINUED | OUTPATIENT
Start: 2019-02-22 | End: 2019-02-22

## 2019-02-22 RX ORDER — HYDROXYZINE HYDROCHLORIDE 50 MG/1
50-100 TABLET, FILM COATED ORAL EVERY 6 HOURS PRN
Status: DISCONTINUED | OUTPATIENT
Start: 2019-02-22 | End: 2019-02-23 | Stop reason: HOSPADM

## 2019-02-22 RX ORDER — ONDANSETRON 2 MG/ML
4 INJECTION INTRAMUSCULAR; INTRAVENOUS EVERY 6 HOURS PRN
Status: DISCONTINUED | OUTPATIENT
Start: 2019-02-22 | End: 2019-02-23 | Stop reason: HOSPADM

## 2019-02-22 RX ORDER — ALUMINA, MAGNESIA, AND SIMETHICONE 2400; 2400; 240 MG/30ML; MG/30ML; MG/30ML
30 SUSPENSION ORAL
Status: DISCONTINUED | OUTPATIENT
Start: 2019-02-22 | End: 2019-02-22

## 2019-02-22 RX ORDER — HYDROXYZINE HYDROCHLORIDE 50 MG/1
50-100 TABLET, FILM COATED ORAL EVERY 6 HOURS PRN
Status: DISCONTINUED | OUTPATIENT
Start: 2019-02-22 | End: 2019-02-22

## 2019-02-22 RX ORDER — ONDANSETRON 2 MG/ML
4 INJECTION INTRAMUSCULAR; INTRAVENOUS EVERY 6 HOURS PRN
Status: DISCONTINUED | OUTPATIENT
Start: 2019-02-22 | End: 2019-02-22

## 2019-02-22 RX ORDER — ALUMINA, MAGNESIA, AND SIMETHICONE 2400; 2400; 240 MG/30ML; MG/30ML; MG/30ML
30 SUSPENSION ORAL
Status: DISCONTINUED | OUTPATIENT
Start: 2019-02-22 | End: 2019-02-23 | Stop reason: HOSPADM

## 2019-02-22 RX ORDER — ACETAMINOPHEN 325 MG/1
650-975 TABLET ORAL EVERY 4 HOURS PRN
Status: DISCONTINUED | OUTPATIENT
Start: 2019-02-22 | End: 2019-02-23 | Stop reason: HOSPADM

## 2019-02-22 RX ADMIN — BETAMETHASONE SODIUM PHOSPHATE AND BETAMETHASONE ACETATE 12 MG: 3; 3 INJECTION, SUSPENSION INTRA-ARTICULAR; INTRALESIONAL; INTRAMUSCULAR at 16:54

## 2019-02-22 NOTE — PLAN OF CARE
Pt arrived for evaluation of cramping/contractions. EFM and toco applied. Triage assessment completed. VIKTOR Stafford CNM, to bedside to evaluate. Labs in process. SVE 3/50/-1. Plan to recheck in 2 hours or PRN.

## 2019-02-22 NOTE — TELEPHONE ENCOUNTER
Pt presented to clinic early for appointment due to cramping symptoms and triage was asked to see patient.    Met with Brie who reports having menstrual-like cramping for the past 1.5 hours that is coming every 3 minutes. She reports it feels the same as with labor with her first child. She drinks water throughout the day and does not believe she is dehydrated. She has regular BMs. Denies painful urination, cloudy urine, urine odor.    Reviewed with CNM who [facility].

## 2019-02-22 NOTE — H&P
HOSPITAL TRIAGE NOTE  ===================    CHIEF COMPLAINT  ========================  Brie Brown is a 33 year old patient presenting today at 36w0d for evaluation of  uterine contractions.    Patient's last menstrual period was 06/15/2018.  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 Was sent to triage for  labor eval. No hx of PTL, no bleeding, no LOF. Baby active. Denies dysuria, URI or GI sx. Feels well.  Prenatal record and labs reviewed from [facility], through [employer] EMR.    CONTRACTIONS: mild, cramping and every 5 minutes  ABDOMINAL PAIN: none  FETAL MOVEMENT: active    VAGINAL BLEEDING: none  RUPTURE OF MEMBRANES: no  PELVIC PAIN: none    PREGNANCY COMPLICATIONS: none  OTHER:     # Pain Assessment:   - Brie is experiencing pain due to  labor contractions. Pain management was discussed and the plan was created in a collaborative fashion.  Brie's response to the current recommendations: engaged  - supportive measures      REVIEW OF SYSTEMS  =====================  C: NEGATIVE for fever, chills  I: NEGATIVE for worrisome rashes, moles or lesions  E: NEGATIVE for vision changes or irritation  R: NEGATIVE for significant cough or SOB  CV: NEGATIVE for chest pain, palpitations or varicosities  GI: NEGATIVE for nausea, abdominal pain, heartburn, or change in bowel habits  : NEGATIVE for frequency, dysuria, or hematuria  M: NEGATIVE for significant arthralgias or myalgia  N: NEGATIVE for headache, weakness, dizziness or paresthesias  P: NEGATIVE for changes in mood or affect    PROBLEM LIST  ===============  Patient Active Problem List    Diagnosis Date Noted     Iron deficiency anemia due to chronic blood loss 2018     Priority: Medium     Supervision of other normal pregnancy - S CN 08/15/2018     Priority:  Medium     9/11/18- 1st trimester screening wnl, placenta posterior  Level 2 us scheduled for 10/19       Papanicolaou smear of cervix with low grade squamous intraepithelial lesion (LGSIL) 04/13/2011     Priority: Medium     In 2009 at [place].  One abnormal then normal colpo.    NIL paps: 3/10, 4/11, 6/12  2/15/16: pap NILM, repeat pap postpartum [ ]         HISTORIES  ==============  ALLERGIES:      Allergies   Allergen Reactions     Seasonal Allergies      PAST MEDICAL HISTORY  Past Medical History:   Diagnosis Date     Abnormal Pap smear 2009    normals since     Anemia 2016    during last pregnancy     LSIL (low grade squamous intraepithelial lesion) on Pap smear 02/02/09     NO ACTIVE PROBLEMS      SOCIAL HISTORY  Social History     Socioeconomic History     Marital status:      Spouse name: More     Number of children: 0     Years of education: Not on file     Highest education level: Not on file   Occupational History     Occupation: Researcher V     Employer: [employer]     Comment: [employer]   Social Needs     Financial resource strain: Not on file     Food insecurity:     Worry: Not on file     Inability: Not on file     Transportation needs:     Medical: Not on file     Non-medical: Not on file   Tobacco Use     Smoking status: Never Smoker     Smokeless tobacco: Never Used   Substance and Sexual Activity     Alcohol use: No     Alcohol/week: 0.0 oz     Comment: socially     Drug use: No     Sexual activity: Yes     Partners: Male     Birth control/protection: None   Lifestyle     Physical activity:     Days per week: Not on file     Minutes per session: Not on file     Stress: Not on file   Relationships     Social connections:     Talks on phone: Not on file     Gets together: Not on file     Attends Yazdanism service: Not on file     Active member of club or organization: Not on file     Attends meetings of clubs or organizations: Not on file     Relationship status: Not on file      Intimate partner violence:     Fear of current or ex partner: Not on file     Emotionally abused: Not on file     Physically abused: Not on file     Forced sexual activity: Not on file   Other Topics Concern     Not on file   Social History Narrative    ** Merged History Encounter **         ** Data from: 6/14/12 Enc Dept: [facility]    Social Documentation:        Balanced Diet: YES    Calcium intake: 3-5 per day    Caffeine: 2 per day    Exercise:  type of activity cardio;  4 times per week    Sunscreen: No    Seatbelts:  Yes    Self Breast Exam:  No    Self Testicular Exam: na    Physical/Emotional/Sexual Abuse: No    Do you feel safe in your environment? Yes        Cholesterol screen up to date: discuss    Eye Exam up to date: Yes    Dental Exam up to date: Yes    Pap smear up to date: last was 02/09, abnml had colposcopy and that was nml    Mammogram up to date: Does Not Apply    Dexa Scan up to date: Does Not Apply    Colonoscopy up to date: Does Not Apply    Immunizations up to date: Yes    Glucose screen if over 40:  na                         ** Data from: 9/8/16 Enc Dept: [facility]    Caffeine intake/servings daily - 1    Calcium intake/servings daily - 3    Exercise 5 times weekly - describe ; yoga, cardio, walks    Sunscreen used - Yes    Seatbelts used - Yes    Guns stored in the home - No    Self Breast Exam - Yes    Pap test up to date -  Yes        Eye exam up to date -  Yes    Dental exam up to date -  Yes    DEXA scan up to date -  No    Flex Sig/Colonoscopy up to date -  No    Mammography up to date -  No    Immunizations reviewed and up to date - Yes    Abuse: Current or Past (Physical, Sexual or Emotiona    l) - No    Do you feel safe in your environment - Yes    Do you cope well with stress - Yes    Do you suffer from insomnia - No    Last updated by: Eugenie Plunkett  8/3/2016     PARTNER:  not here  EMPLOYMENT: researcher for the [employer]   FAMILY HISTORY  Family  History   Problem Relation Age of Onset     Lipids Mother      Hypertension Father      Depression Father      Lipids Father      Heart Disease Paternal Grandfather         passsed away from MI     Prostate Cancer Paternal Grandfather      Cerebrovascular Disease Paternal Grandmother      Alcohol/Drug Paternal Grandmother      Arthritis Maternal Grandmother      Depression Sister      OB HISTORY  Obstetric History       T1      L1     SAB1   TAB0   Ectopic0   Multiple0   Live Births1       # Outcome Date GA Lbr Max/2nd Weight Sex Delivery Anes PTL Lv   3 Current            2 Term 17 39w6d 06:30 / 00:40 3.629 kg (8 lb) M  None N LIA      Name: Sinan      Complications: GBS   1 SAB 16 11w0d    AB, COMPLETE         Obstetric Comments   Son went to NICU briefly for TTN, GBS pos. Small mediolateral episiotomy for decels.    Still breastfeeding her son at bedtime   No hx DM, HTN, PTL     Prenatal Labs:   Lab Results   Component Value Date    ABO A 2018    RH Pos 2018    AS Neg 2018    HEPBANG Nonreactive 2018    TREPAB Negative 2016    RUQIGG 58 2018    HGB 10.4 (L) 2018    HIV Negative 2007     Rubella- immune    ULTRASOUND(s) reviewed: 10/19/18- screening US wnl. The presence of an EIF does not increase the risk for aneuploidy due to the normal first trimester screen. Further ultrasound studies as clinically indicated    EXAM  ============  LMP 06/15/2018   GENERAL APPEARANCE: healthy, alert and no distress  RESP: lungs clear to auscultation - no rales, rhonchi or wheezes  CV: regular rates and rhythm, normal S1 S2, no S3 or S4 and no murmur,and no varicosities  ABDOMEN:  soft, nontender, no epigastric pain  SKIN: no suspicious lesions or rashes  NEURO: Denies headache, blurred vision, other vision changes  PSYCH: mentation appears normal. and affect normal/bright  MS/ LEGS: No edema    CONTRACTIONS: every 2-3 minutes, cramping. Palpate  moderate  FETAL HEART TONES: continuous EFM- baseline 135 with moderate variability and positive accelerations. No decelerations.  NST: REACTIVE  EFW:6    PELVIC EXAM: 3/50/mid/avg/-1. No BOW palp. No bloody show noted  PRESENTATION: VERTEX per limited BSUS  BLOOD: no  DISCHARGE: none    ROM: no  ROMPLUS: not done    LABS: UA, UC, MOHINDER-7, Wet prep, GBS, CBC with platelets and GC/ Chlamydia  Lab results reviewed- pending  DIAGNOSIS  ============  36w0d [facility] the [facility] for  labor evaluation  NST: REACTIVE  Fetal Heart rate tracing:category one  Patient Active Problem List   Diagnosis     Papanicolaou smear of cervix with low grade squamous intraepithelial lesion (LGSIL)     Supervision of other normal pregnancy - [facility] CNM     Iron deficiency anemia due to chronic blood loss       PLAN  ============  Plan to ambulate and oral hydration.  Reevaluate in 2 hours or prn strengthening contr.  Labs pending.  Nora Bennett, SANCHEZ CNM

## 2019-02-23 VITALS
HEIGHT: 68 IN | RESPIRATION RATE: 18 BRPM | SYSTOLIC BLOOD PRESSURE: 104 MMHG | BODY MASS INDEX: 24.49 KG/M2 | TEMPERATURE: 98.8 F | WEIGHT: 161.6 LBS | DIASTOLIC BLOOD PRESSURE: 65 MMHG

## 2019-02-23 VITALS — SYSTOLIC BLOOD PRESSURE: 102 MMHG | DIASTOLIC BLOOD PRESSURE: 62 MMHG | TEMPERATURE: 98.2 F | RESPIRATION RATE: 16 BRPM

## 2019-02-23 PROBLEM — B95.1 POSITIVE GBS TEST: Status: ACTIVE | Noted: 2019-02-23

## 2019-02-23 LAB
GP B STREP DNA SPEC QL NAA+PROBE: POSITIVE
SPECIMEN SOURCE: ABNORMAL
T PALLIDUM AB SER QL: NONREACTIVE

## 2019-02-23 PROCEDURE — 25000128 H RX IP 250 OP 636: Performed by: ADVANCED PRACTICE MIDWIFE

## 2019-02-23 PROCEDURE — G0463 HOSPITAL OUTPT CLINIC VISIT: HCPCS | Mod: 25

## 2019-02-23 PROCEDURE — 59025 FETAL NON-STRESS TEST: CPT

## 2019-02-23 PROCEDURE — G0463 HOSPITAL OUTPT CLINIC VISIT: HCPCS

## 2019-02-23 PROCEDURE — 96372 THER/PROPH/DIAG INJ SC/IM: CPT

## 2019-02-23 RX ORDER — BETAMETHASONE SODIUM PHOSPHATE AND BETAMETHASONE ACETATE 3; 3 MG/ML; MG/ML
12 INJECTION, SUSPENSION INTRA-ARTICULAR; INTRALESIONAL; INTRAMUSCULAR; SOFT TISSUE ONCE
Status: COMPLETED | OUTPATIENT
Start: 2019-02-23 | End: 2019-02-23

## 2019-02-23 RX ADMIN — BETAMETHASONE SODIUM PHOSPHATE AND BETAMETHASONE ACETATE 12 MG: 3; 3 INJECTION, SUSPENSION INTRA-ARTICULAR; INTRALESIONAL; INTRAMUSCULAR at 17:37

## 2019-02-23 ASSESSMENT — MIFFLIN-ST. JEOR: SCORE: 1493.26

## 2019-02-23 NOTE — PLAN OF CARE
Contractions have spaced out, see flow sheet for contraction pattern and FHR, denies pain, will continue monitor and will notify provider if there is a change in status.

## 2019-02-23 NOTE — PROVIDER NOTIFICATION
02/23/19 0330   Provider Notification   Provider Name/Title Nydia ESTRADA   Method of Notification Electronic Page   Request Evaluate - Remote   Notification Reason Decels   CNM notified of one contraction with late deceleration during NST. Orders received to continue monitoring.

## 2019-02-23 NOTE — PLAN OF CARE
Data: Patient presented to [facility] at 1650.   Reason for maternal/fetal assessment per patient is here for a Beta injection.  Patient is a . Prenatal record reviewed.      Obstetric History       T1      L1     SAB1   TAB0   Ectopic0   Multiple0   Live Births1       # Outcome Date GA Lbr Max/2nd Weight Sex Delivery Anes PTL Lv   3 Current            2 Term 17 39w6d 06:30 / 00:40 3.629 kg (8 lb) M  None N LIA      Name: Sinan      Complications: GBS   1 SAB 16 11w0d    AB, COMPLETE         Obstetric Comments   Son went to NICU briefly for TTN, GBS pos. Small mediolateral episiotomy for decels.    Still breastfeeding her son at bedtime   No hx DM, HTN, PTL   . Medical history:   Past Medical History:   Diagnosis Date     Abnormal Pap smear     normals since     Anemia 2016    during last pregnancy     LSIL (low grade squamous intraepithelial lesion) on Pap smear 09     NO ACTIVE PROBLEMS    Gestational Age 36w1d. VSS. Fetal movement present. Patient denies cramping, backache, vaginal discharge, pelvic pressure, UTI symptoms, GI problems, bloody show, vaginal bleeding, edema, headache, visual disturbances, epigastric or URQ pain, abdominal pain, rupture of membranes. Support persons are not present.  Action: Verbal consent for EFM. Triage assessment completed. EFM and TOCO applied for fetal and uterine assessment.  Response: NIKIA JUAREZM informed of arrival.

## 2019-02-23 NOTE — PLAN OF CARE
Beta injection given, NST reactive, offers no complaints, denies contractions.  Discharged to home and will follow up in clinic.

## 2019-02-23 NOTE — PLAN OF CARE
D: Patient states she is not antoine. Non stress test done and 1 contraction noted on the monitor. Discharge instruction reviewed with patient and states understanding that she will return to labor and delivery at 1645 for her 2nd Betamethasone injection.  P: Discharged to home undelivered.

## 2019-02-23 NOTE — DISCHARGE INSTRUCTIONS
Discharge Instruction for Undelivered Patients      You were seen for: Labor Assessment  We Consulted: Cody  You had (Test or Medicine):monitoring, cervical check     Diet:   Drink 8 to 12 glasses of liquids (milk, juice, water) every day.  You may eat meals and snacks.     Activity:  Call your doctor or nurse midwife if your baby is moving less than usual.     Call your provider if you notice:  Swelling in your face or increased swelling in your hands or legs.  Headaches that are not relieved by Tylenol (acetaminophen).  Changes in your vision (blurring: seeing spots or stars.)  Nausea (sick to your stomach) and vomiting (throwing up).   Weight gain of 5 pounds or more per week.  Heartburn that doesn't go away.  Signs of bladder infection: pain when you urinate (use the toilet), need to go more often and more urgently.  The bag of cisneros (rupture of membranes) breaks, or you notice leaking in your underwear.  Bright red blood in your underwear.  Abdominal (lower belly) or stomach pain.  For first baby: Contractions (tightening) less than 5 minutes apart for one hour or more.  Second (plus) baby: Contractions (tightening) less than 10 minutes apart and getting stronger.  *If less than 34 weeks: Contractions (tightenings) more than 6 times in one hour.  Increase or change in vaginal discharge (note the color and amount)      Follow-up:  Follow up today at 1645 for 2nd Beta shot

## 2019-02-23 NOTE — PROGRESS NOTES
"S:  Brie Brown is a 33 year old  @ 36w1d who presents to triage for NST and second dose of betamethasone.    Patient was discharged this morning after overnight monitoring for  contractions. Her contractions resolved and her cervix remained unchanged at time of discharge.  She reports no further contractions now or throughout the day today. Denies vaginal bleeding, discharge or leakage of fluid. Reports +fetal movement.    1st dose of BMTZ was given on  @ 1654.     O:   /65   Temp 98.8  F (37.1  C) (Oral)   Resp 18   Ht 1.722 m (5' 7.8\")   Wt 73.3 kg (161 lb 9.6 oz)   LMP 06/15/2018   BMI 24.72 kg/m     Constitutional:   Alert and oriented, well developed, in no acute distress.     FHR: Baseline 130, moderate variability, +accelerations. No decelerations.  TOCO: ctx x 1, non palpable, not felt by patient    Fetal Non-Stress Test Results    NST Ordered By: SANCHEZ Arshad, NATALIE        NST Start & Stop Times  Start:  Stop:     NST Results  Fetus A   Baseline Rate: 130  Accelerations: Present  Decelerations: None  Interpretation: reactive      Physical exam deferred.  No cervical exam indicated.    A:   IUP @ 36+1    Returned for 2nd dose of BMTZ  NST reactive  No contractions    Not in  labor    GBS positive    Patient Active Problem List   Diagnosis     Papanicolaou smear of cervix with low grade squamous intraepithelial lesion (LGSIL)     Supervision of other normal pregnancy - S CN     Iron deficiency anemia due to chronic blood loss      contractions     Positive GBS test     Labor and delivery indication for care or intervention      P:  - NST reactive. Not laboring.  - 2nd dose of betamethasone administered.  - Discussed GBS positive result and recommendation for IV antibiotic prophylaxis.  Pt states that she previously had augmentin listed as an allergy but she recently removed it because she was unsure of the accuracy.  Reports that her mother told her " that she took it and got a rash as a baby. Her mother does not remember this now.  Pt prefers to have IV penicillin for GBS prophylaxis.   Will review with MD on HR rounds this week and make a plan for labor.  - Emphasized  labor precautions, fetal movement counts and s/s to present to triage.  - Follow up in the next week with MARK appointment in clinic.  - Pt verbalized understanding and agrees with plan of care.    - Discharged to home in stable condition.    Yuni Gates, SANCHEZ, CNM

## 2019-02-23 NOTE — PLAN OF CARE
VSS, afebrile. Denies leaking of fluid or vaginal bleeding. +FM. Patient able to sleep much of night, states she has not been feeling contractions. One contraction noted during NST accompanied by late deceleration. NST extended with reactivity. Plan for CNM to recheck cervix this morning and decide whether patient will stay or be discharged.

## 2019-02-23 NOTE — DISCHARGE SUMMARY
DISCHARGE SUMMARY  =================  36w1d  Brie Brown is a 33 year old female     with an Patient's last menstrual period was 06/15/2018. and Estimated Date of Delivery: Mar 22, 2019 [facility] 19 for  labor evaluation .    ADMISSION DIAGNOSIS:  Labor Evaluation    HPI  ================  Pt presented to  yesterday with c/o regular contractions and cramping throughout the morning. She denies vaginal bleeding, discharge or leakage of fluid. Reported +fetal movement.   Her initial exam at 1419 was 3/50/-1, mid/med. Vertex via BSUS. UA/UC, wet prep, gc/ct and GBS were collected.  She was monitored and PO hydrated. She continued to feel contractions and cramping did not not notice a significant different in strength or intensity. Her repeat cervical exam at approx 2000 was 4/50/0. She received her first dose of betamethasone at 1654 after review of r/b/a. Plan was made for patient to be observed throughout the night. By 2200, pt reported that the contractions had spaced out and were not as noticeable. TOCO picking up ctx irregularly, q5-10 minutes, palpating mild. She then slept off and on throughout the night reporting resolution of contractions. At 0830, pt reported she had not felt contractions for a very long time and was interested in discharging to home. EFM and TOCO placed. Cat 1 fetal tracing, no contractions on monitor. No contractions felt by patient, no palpable contractions. SVE performed at 0837 and was 3/50/-1, posterior/med.    HOSPITALIZATION COURSE  =================  # Discharge Pain Plan:  - Patient currently has NO PAIN and is not being prescribed pain medications on discharge.    PROCEDURES  ============  EFM, TOCO  1st dose BMTZ given @ 1654    LABS:   ===========  LABS DONE:  UA/UC, wet prep, gc/ct and GBS were collected    ULTRA SOUND IN HOSPITAL:  ==============  BSUS confirmed vertex    DISCHARGE PHYSICAL  EXAM:  ===============  Blood pressure 102/62, temperature 98.2  F (36.8  C), temperature source Oral, resp. rate 16, last menstrual period 06/15/2018, not currently breastfeeding.  General appearance: comfortable  Heart: RRR without murmur  Lungs: clear to auscultation   Neuro: denies headache and visual disturbances  Psych: Mentation normal and bright   Legs: 2+/2+, no clonus, no edema     Abdomen: gravid, single vertex fetus, non-tender.     CONTRACTIONS: not present  FETAL HEART TONES: baseline 125 with moderate FHR variability and  pos accelerations. No decelerations present.      PELVIC EXAM: 3/50/-1, mid/med    DISCHARGE DIAGNOSIS:  ==============  IUP @ 36w1d   DISCHARGE DIAGNOSIS: Not in  labor. No cervical change.  NST REACTIVE  Fetal Heart rate tracing Category category one    Resolution of  contractions, no further cervical change    GBS pending    BMTZ x 1 @ 1654 , will return for 2nd dose     PLAN:  ===========  GBS pending.  Reviewed resolution of contractions and no cervical change.  Emphasized  labor precautions, fetal movement counts and s/s to return to triage.  Plan to discharge to home now and return at approx 1645 for 2nd dose of BMTZ and NST.  Pt verbalized understanding and agrees with plan of care.    Discharged to home in stable condition.    Yuni Gates, SANCHEZ, NATALIE

## 2019-02-23 NOTE — PROGRESS NOTES
Blood pressure 121/64, temperature 99  F (37.2  C), temperature source Oral, resp. rate 18, last menstrual period 06/15/2018, not currently breastfeeding.  Patient Vitals for the past 24 hrs:   BP Temp Temp src Resp   19 1430 121/64 -- -- 18   19 1427 -- 99  F (37.2  C) Oral --     General appearance: comfortable  Contractions have spaced out over last couple of hours. No bleeding, no vag Discharge. Baby active. Feels well  CONTACTIONS: irreg, mild and every 5-10 minutes  FETAL HEART TONES: intermittent EFM- baseline 125 with moderate variability and positive accelerations. No decelerations.  ROM: not ruptured  PELVIC EXAM:deferred  # Pain Assessment:  Current Pain Score 2019   Patient currently in pain? yes   Brie deras pain level was assessed and she currently denies pain.      ASSESSMENT:  ==============  IUP @ 36w0d  contractions with cervical change   Betamethasone x1  Fetal Heart Rate Tracing category one  GBS- pending  Patient Active Problem List   Diagnosis     Papanicolaou smear of cervix with low grade squamous intraepithelial lesion (LGSIL)     Supervision of other normal pregnancy - [facility] CNM     Iron deficiency anemia due to chronic blood loss      contractions     PLAN:  ===========  Recommended pt stay overnight for observation. Agreeable to plan.    Reevaluate in AM  Dr Capone available.  SANCHEZ George CNM

## 2019-02-26 LAB
BACTERIA SPEC CULT: ABNORMAL
SPECIMEN SOURCE: ABNORMAL

## 2019-02-28 ENCOUNTER — OFFICE VISIT (OUTPATIENT)
Dept: OBGYN | Facility: CLINIC | Age: 34
End: 2019-02-28
Attending: ADVANCED PRACTICE MIDWIFE
Payer: COMMERCIAL

## 2019-02-28 VITALS
WEIGHT: 160.7 LBS | HEIGHT: 67 IN | HEART RATE: 97 BPM | BODY MASS INDEX: 25.22 KG/M2 | SYSTOLIC BLOOD PRESSURE: 122 MMHG | DIASTOLIC BLOOD PRESSURE: 83 MMHG

## 2019-02-28 DIAGNOSIS — O99.113 BENIGN GESTATIONAL THROMBOCYTOPENIA IN THIRD TRIMESTER (H): ICD-10-CM

## 2019-02-28 DIAGNOSIS — B95.1 POSITIVE GBS TEST: ICD-10-CM

## 2019-02-28 DIAGNOSIS — D69.6 BENIGN GESTATIONAL THROMBOCYTOPENIA IN THIRD TRIMESTER (H): ICD-10-CM

## 2019-02-28 DIAGNOSIS — Z34.80 SUPERVISION OF OTHER NORMAL PREGNANCY, ANTEPARTUM: Primary | ICD-10-CM

## 2019-02-28 DIAGNOSIS — O99.013 ANEMIA DURING PREGNANCY IN THIRD TRIMESTER: ICD-10-CM

## 2019-02-28 PROCEDURE — G0463 HOSPITAL OUTPT CLINIC VISIT: HCPCS | Mod: ZF

## 2019-02-28 ASSESSMENT — MIFFLIN-ST. JEOR: SCORE: 1476.56

## 2019-02-28 NOTE — PROGRESS NOTES
"Subjective:      33 year old  at 36w6d presents for a routine prenatal appointment.         no vaginal bleeding,  leakage of fluid, or change in vaginal discharge.  + irregular, mild contractions.  + fetal movement.     No HA, visual changes, RUQ or epigastric pain.   Patient concerns:   Feeling well overall.   - Having some contractions but very infrequent and mild.  - Spoke to her mom about PCN allergy - full body rash from Augmentin for ear infection.   - Agreeable to do CBC weekly for gestational thrombocytopenia.   - Pt was in patient for PTL work up.  Is interested in her SVE today.     Objective:  Vitals:    19 1459   BP: 122/83   BP Location: Left arm   Patient Position: Chair   Pulse: 97   Weight: 72.9 kg (160 lb 11.2 oz)   Height: 1.702 m (5' 7\")    See OB flowsheet    SVE - 3/50/-2  Mid, medium.  No change since discharged from hospital     Assessment/Plan     Encounter Diagnoses   Name Primary?     Supervision of other normal pregnancy - [facility] CNM Yes     Benign gestational thrombocytopenia in third trimester (H)      Positive GBS test, **HR ROUNDS**      Anemia during pregnancy in third trimester      Orders Placed This Encounter   Procedures     CBC with platelets         PHQ-9 SCORE 2018   PHQ-9 Total Score 2 0       GBS screening: POSITIVE. Reviewed with history of PCN allergy and this GBS resistant to clindamycin will need Vancomycin.  Reviewed anything aside from PCN not considered adequate prophylaxis per CDC so infant may need extended postpartum stay and pt to boarding prn. Pt agreeable.   Birth preferences reviewed: Water birth consent on file, Hep C NR  CBC ordered, pt will have done this week.   Labor signs discussed. Reinforced daily fetal movement counts.  Reviewed why/how to contact provider if headache/visual changes/RUQ or epigastric pain, decreased fetal movement, vaginal bleeding, leakage of fluid.   Return to clinic in 1 week and prn if questions or " concerns.     SANCHEZ TorresM

## 2019-02-28 NOTE — LETTER
"2019       RE: Brie Brown  [address]  [facility] [address]     Dear Colleague,    Thank you for referring your patient, Brie Brown, to the [facility] at [facility]. Please see a copy of my visit note below.    Subjective:      33 year old  at 36w6d presents for a routine prenatal appointment.         no vaginal bleeding,  leakage of fluid, or change in vaginal discharge.  + irregular, mild contractions.  + fetal movement.     No HA, visual changes, RUQ or epigastric pain.   Patient concerns:   Feeling well overall.   - Having some contractions but very infrequent and mild.  - Spoke to her mom about PCN allergy - full body rash from Augmentin for ear infection.   - Agreeable to do CBC weekly for gestational thrombocytopenia.   - Pt was in patient for PTL work up.  Is interested in her SVE today.     Objective:  Vitals:    19 1459   BP: 122/83   BP Location: Left arm   Patient Position: Chair   Pulse: 97   Weight: 72.9 kg (160 lb 11.2 oz)   Height: 1.702 m (5' 7\")    See OB flowsheet    SVE - 3/50/-2  Mid, medium.  No change since discharged from hospital     Assessment/Plan     Encounter Diagnoses   Name Primary?     Supervision of other normal pregnancy - [facility] CNM Yes     Benign gestational thrombocytopenia in third trimester (H)      Positive GBS test, **HR ROUNDS**      Anemia during pregnancy in third trimester      Orders Placed This Encounter   Procedures     CBC with platelets       PHQ-9 SCORE 2018   PHQ-9 Total Score 2 0       GBS screening: POSITIVE. Reviewed with history of PCN allergy and this GBS resistant to clindamycin will need Vancomycin.  Reviewed anything aside from PCN not considered adequate prophylaxis per CDC so infant may need extended postpartum stay and pt to boarding prn. Pt agreeable.   Birth preferences reviewed: Water birth consent on file, Hep C NR  CBC ordered, pt will have done this " week.   Labor signs discussed. Reinforced daily fetal movement counts.  Reviewed why/how to contact provider if headache/visual changes/RUQ or epigastric pain, decreased fetal movement, vaginal bleeding, leakage of fluid.   Return to clinic in 1 week and prn if questions or concerns.     SANCHEZ TorresM

## 2019-03-01 DIAGNOSIS — D69.6 BENIGN GESTATIONAL THROMBOCYTOPENIA IN THIRD TRIMESTER (H): ICD-10-CM

## 2019-03-01 DIAGNOSIS — O99.113 BENIGN GESTATIONAL THROMBOCYTOPENIA IN THIRD TRIMESTER (H): ICD-10-CM

## 2019-03-01 LAB
ERYTHROCYTE [DISTWIDTH] IN BLOOD BY AUTOMATED COUNT: 13.2 % (ref 10–15)
HCT VFR BLD AUTO: 35.9 % (ref 35–47)
HGB BLD-MCNC: 11.8 G/DL (ref 11.7–15.7)
MCH RBC QN AUTO: 30.3 PG (ref 26.5–33)
MCHC RBC AUTO-ENTMCNC: 32.9 G/DL (ref 31.5–36.5)
MCV RBC AUTO: 92 FL (ref 78–100)
PLATELET # BLD AUTO: 204 10E9/L (ref 150–450)
RBC # BLD AUTO: 3.89 10E12/L (ref 3.8–5.2)
WBC # BLD AUTO: 10.2 10E9/L (ref 4–11)

## 2019-03-01 PROCEDURE — 36415 COLL VENOUS BLD VENIPUNCTURE: CPT | Performed by: ADVANCED PRACTICE MIDWIFE

## 2019-03-01 PROCEDURE — 85027 COMPLETE CBC AUTOMATED: CPT | Performed by: ADVANCED PRACTICE MIDWIFE

## 2019-03-03 PROBLEM — O99.113 BENIGN GESTATIONAL THROMBOCYTOPENIA IN THIRD TRIMESTER (H): Status: ACTIVE | Noted: 2019-03-03

## 2019-03-03 PROBLEM — D69.6 BENIGN GESTATIONAL THROMBOCYTOPENIA IN THIRD TRIMESTER (H): Status: ACTIVE | Noted: 2019-03-03

## 2019-03-03 PROBLEM — O99.013 ANEMIA DURING PREGNANCY IN THIRD TRIMESTER: Status: ACTIVE | Noted: 2019-03-03

## 2019-03-08 ENCOUNTER — OFFICE VISIT (OUTPATIENT)
Dept: OBGYN | Facility: CLINIC | Age: 34
End: 2019-03-08
Attending: ADVANCED PRACTICE MIDWIFE
Payer: COMMERCIAL

## 2019-03-08 VITALS
HEIGHT: 67 IN | SYSTOLIC BLOOD PRESSURE: 110 MMHG | DIASTOLIC BLOOD PRESSURE: 75 MMHG | HEART RATE: 82 BPM | BODY MASS INDEX: 25.54 KG/M2 | WEIGHT: 162.7 LBS

## 2019-03-08 DIAGNOSIS — O99.113 BENIGN GESTATIONAL THROMBOCYTOPENIA IN THIRD TRIMESTER (H): ICD-10-CM

## 2019-03-08 DIAGNOSIS — Z34.80 SUPERVISION OF OTHER NORMAL PREGNANCY, ANTEPARTUM: Primary | ICD-10-CM

## 2019-03-08 DIAGNOSIS — D69.6 BENIGN GESTATIONAL THROMBOCYTOPENIA IN THIRD TRIMESTER (H): ICD-10-CM

## 2019-03-08 ASSESSMENT — MIFFLIN-ST. JEOR: SCORE: 1485.63

## 2019-03-08 NOTE — PROGRESS NOTES
"Subjective:     33 year old  at 38w0d presents for routine prenatal visit.          Patient concerns: Feeling well overall. Patient reports she is a little frustrated because she was going into labor yesterday. Started to notice contractions Wednesday night that continued into the morning. Resolved. Not feeling any regular contractions now. Has been trying to focus on intensity when she notices contractions to determine early vs active labor.     Denies vaginal bleeding, discharge or leakage of fluid. Reports +fetal movement.  No HA, vision changes, ruq/epigastric pain.      Has been having \"cold\" symptoms- sinus congestion and mild cough from post-nasal drip. Using neti pot. Inquiring about if antibiotics are recommended.     Objective:  Vitals:    19 1020   BP: 110/75   Pulse: 82   Weight: 73.8 kg (162 lb 11.2 oz)   Height: 1.702 m (5' 7\")      See OB flowsheet  Assessment/Plan     Encounter Diagnosis   Name Primary?     Supervision of other normal pregnancy - S NATALIE Yes     - Reviewed why/how to contact provider if headache/visual changes/RUQ or epigastric pain, decreased fetal movement, vaginal bleeding, leakage of fluid or strong/regular contractions.   Patient education/orders or handouts today:  Sign/symptoms of labor and When to call for labor or other concerns    - Reviewed supportive measures for sinus congestion that are safe in pregnancy.   - Will consult with IM re: any recommendations for antibiotics.  - Reviewed vancomycin in labor or with ROM d/t GBS+.     Continue scheduled prenatal care, RTC in 1 week and prn if questions or concerns.      SANCHEZ Arshad CNM     Addendum:  - Consulted with Dr. Mckeon who recommended supportive measures, including afrin nasal spray, hydration, sudafed.   If worsening may consider antibiotics.  - Notified patient who will keep in contact with clinic re: symptoms.  - Appt in 1 week.    SANCHEZ Arshad CNM   "

## 2019-03-08 NOTE — LETTER
"  RE: Brie Brown  [address] [facility] [address]     Dear Colleague,    Thank you for referring your patient, Brie Brown, to the [facility] at [facility]. Please see a copy of my visit note below.    Subjective:     33 year old  at 38w0d presents for routine prenatal visit.          Patient concerns: Feeling well overall. Patient reports she is a little frustrated because she was going into labor yesterday. Started to notice contractions Wednesday night that continued into the morning. Resolved. Not feeling any regular contractions now. Has been trying to focus on intensity when she notices contractions to determine early vs active labor.     Denies vaginal bleeding, discharge or leakage of fluid. Reports +fetal movement.  No HA, vision changes, ruq/epigastric pain.      Has been having \"cold\" symptoms- sinus congestion and mild cough from post-nasal drip. Using neti pot. Inquiring about if antibiotics are recommended.     Objective:  Vitals:    19 1020   BP: 110/75   Pulse: 82   Weight: 73.8 kg (162 lb 11.2 oz)   Height: 1.702 m (5' 7\")      See OB flowsheet  Assessment/Plan     Encounter Diagnosis   Name Primary?     Supervision of other normal pregnancy - S CNM Yes     - Reviewed why/how to contact provider if headache/visual changes/RUQ or epigastric pain, decreased fetal movement, vaginal bleeding, leakage of fluid or strong/regular contractions.   Patient education/orders or handouts today:  Sign/symptoms of labor and When to call for labor or other concerns    - Reviewed supportive measures for sinus congestion that are safe in pregnancy.   - Will consult with IM re: any recommendations for antibiotics.  - Reviewed vancomycin in labor or with ROM d/t GBS+.     Continue scheduled prenatal care, RTC in 1 week and prn if questions or concerns.      SANCHEZ Arshad, CNM     Addendum:  - Consulted with Dr. Mckeon " who recommended supportive measures, including afrin nasal spray, hydration, sudafed.   If worsening may consider antibiotics.  - Notified patient who will keep in contact with clinic re: symptoms.  - Appt in 1 week.    SANCHEZ Arshad CNM

## 2019-03-12 ENCOUNTER — OFFICE VISIT (OUTPATIENT)
Dept: OBGYN | Facility: CLINIC | Age: 34
End: 2019-03-12
Attending: ADVANCED PRACTICE MIDWIFE
Payer: COMMERCIAL

## 2019-03-12 DIAGNOSIS — Z34.80 SUPERVISION OF OTHER NORMAL PREGNANCY, ANTEPARTUM: Primary | ICD-10-CM

## 2019-03-12 PROBLEM — O99.113 BENIGN GESTATIONAL THROMBOCYTOPENIA IN THIRD TRIMESTER (H): Status: RESOLVED | Noted: 2019-03-03 | Resolved: 2019-03-12

## 2019-03-12 PROBLEM — D69.6 BENIGN GESTATIONAL THROMBOCYTOPENIA IN THIRD TRIMESTER (H): Status: RESOLVED | Noted: 2019-03-03 | Resolved: 2019-03-12

## 2019-03-12 NOTE — LETTER
RE: Brie Brown  [address] [facility] [address]     Dear Colleague,    Thank you for referring your patient, Brie Brown, to the [facility] at [facility]. Please see a copy of my visit note below.    Subjective:     33 year old  at 38w4d presents for routine prenatal visit.   Denies vaginal bleeding or leakage of fluid.  Irregular contractions.  + fetal movement.       No HA, visual changes, RUQ or epigastric pain.   Patient concerns:    - Pt reports irregular contractions that feel like labor, starting and stopping, occasional increasing in intensity. Frustrated with contraction pattern, much different than last labor.   - Reports not sleeping well and has cough/cold. Has been keeping well-hydrated.    Objective:  There were no vitals filed for this visit. See OB flowsheet    SVE: 4-5 cm/70/-2, sutures palpated    Assessment/Plan:  Encounter Diagnosis   Name Primary?     Supervision of other normal pregnancy - S CNM Yes     - Reviewed why/how to contact provider if headache/visual changes/RUQ or epigastric pain, decreased fetal movement, vaginal bleeding, leakage of fluid or strong/regular contractions.  - Patient education/orders or handouts today: Sign/symptoms of labor and When to call for labor or other concerns   - Discussed prodromal labor and encouraged rest/hydration. Encouraged to call/present to BP if contractions reach significant intensity.  - Return to clinic in 1 week and prn if questions or concerns.     Again, thank you for allowing me to participate in the care of your patient.      Sincerely,    SANCHEZ Rodriguez CNM

## 2019-03-12 NOTE — PROGRESS NOTES
Subjective:     33 year old  at 38w4d presents for routine prenatal visit.   Denies vaginal bleeding or leakage of fluid.  Irregular contractions.  + fetal movement.       No HA, visual changes, RUQ or epigastric pain.   Patient concerns:    - Pt reports irregular contractions that feel like labor, starting and stopping, occasional increasing in intensity. Frustrated with contraction pattern, much different than last labor.   - Reports not sleeping well and has cough/cold. Has been keeping well-hydrated.    Objective:  There were no vitals filed for this visit. See OB flowsheet    SVE: 4-5 cm/70/-2, sutures palpated    Assessment/Plan:  Encounter Diagnosis   Name Primary?     Supervision of other normal pregnancy - [facility] CNM Yes     - Reviewed why/how to contact provider if headache/visual changes/RUQ or epigastric pain, decreased fetal movement, vaginal bleeding, leakage of fluid or strong/regular contractions.  - Patient education/orders or handouts today: Sign/symptoms of labor and When to call for labor or other concerns   - Discussed prodromal labor and encouraged rest/hydration. Encouraged to call/present to BP if contractions reach significant intensity.  - Return to clinic in 1 week and prn if questions or concerns.

## 2019-03-13 NOTE — PROGRESS NOTES
"Subjective:     33 year old  at 38w5d presents for routine prenatal visit.   Denies vaginal bleeding or leakage of fluid.  Endorses contractions, all week, lots of BH ctx that eventually subside.  Endorses fetal movement.        No HA, visual changes, RUQ or epigastric pain.   Patient concerns: Her last delivery was fast and her baby had issues due to inadequate GBS treatment in labor. Given her last cervical exams was 4.5/70/-2 she is worried that she will not make it in time to receive adequate treatment this time (also GBS+).  Ongoing issues with sinus infection x2 weeks, still having dark green mucous, requesting treatment. Feeling well overall.    Objective:  Vitals:    03/15/19 1131   BP: 117/73   BP Location: Left arm   Patient Position: Chair   Pulse: 102   Weight: 72.7 kg (160 lb 4.8 oz)   Height: 1.702 m (5' 7\")   See OB flowsheet    Assessment/Plan     Encounter Diagnosis   Name Primary?     Supervision of other normal pregnancy - S CNM Yes     No orders of the defined types were placed in this encounter.    Orders Placed This Encounter   Medications     azithromycin (ZITHROMAX) 250 MG tablet     Si tablets first day, then one tablet daily for four days     Dispense:  6 tablet     Refill:  0     1 z-pack     - Reviewed low threshold to come in with frequent strong contractions, definition of active labor is 5cm and she is already close to that threshold.   - Reviewed why/how to contact provider if headache/visual changes/RUQ or epigastric pain, decreased fetal movement, vaginal bleeding, leakage of fluid or strong/regular contractions.   Patient education/orders or handouts today:  Sign/symptoms of labor and When to call for labor or other concerns  - Return to clinic in 1 week and prn if questions or concerns.     Tahira Cortes CNM      "

## 2019-03-15 ENCOUNTER — OFFICE VISIT (OUTPATIENT)
Dept: OBGYN | Facility: CLINIC | Age: 34
End: 2019-03-15
Attending: ADVANCED PRACTICE MIDWIFE
Payer: COMMERCIAL

## 2019-03-15 ENCOUNTER — HOSPITAL ENCOUNTER (INPATIENT)
Facility: CLINIC | Age: 34
LOS: 2 days | Discharge: HOME | End: 2019-03-17
Attending: ADVANCED PRACTICE MIDWIFE | Admitting: ADVANCED PRACTICE MIDWIFE
Payer: COMMERCIAL

## 2019-03-15 VITALS
WEIGHT: 160.3 LBS | SYSTOLIC BLOOD PRESSURE: 117 MMHG | DIASTOLIC BLOOD PRESSURE: 73 MMHG | HEART RATE: 102 BPM | BODY MASS INDEX: 25.16 KG/M2 | HEIGHT: 67 IN

## 2019-03-15 DIAGNOSIS — Z34.80 SUPERVISION OF OTHER NORMAL PREGNANCY, ANTEPARTUM: Primary | ICD-10-CM

## 2019-03-15 LAB
ABO + RH BLD: NORMAL
ABO + RH BLD: NORMAL
BASOPHILS # BLD AUTO: 0 10E9/L (ref 0–0.2)
BASOPHILS NFR BLD AUTO: 0.1 %
BLD GP AB SCN SERPL QL: NORMAL
BLOOD BANK CMNT PATIENT-IMP: NORMAL
DIFFERENTIAL METHOD BLD: ABNORMAL
EOSINOPHIL # BLD AUTO: 0.1 10E9/L (ref 0–0.7)
EOSINOPHIL NFR BLD AUTO: 0.8 %
ERYTHROCYTE [DISTWIDTH] IN BLOOD BY AUTOMATED COUNT: 12.8 % (ref 10–15)
HCT VFR BLD AUTO: 31.5 % (ref 35–47)
HGB BLD-MCNC: 10.5 G/DL (ref 11.7–15.7)
IMM GRANULOCYTES # BLD: 0 10E9/L (ref 0–0.4)
IMM GRANULOCYTES NFR BLD: 0.3 %
LYMPHOCYTES # BLD AUTO: 2.6 10E9/L (ref 0.8–5.3)
LYMPHOCYTES NFR BLD AUTO: 27.9 %
MCH RBC QN AUTO: 29.6 PG (ref 26.5–33)
MCHC RBC AUTO-ENTMCNC: 33.3 G/DL (ref 31.5–36.5)
MCV RBC AUTO: 89 FL (ref 78–100)
MONOCYTES # BLD AUTO: 0.6 10E9/L (ref 0–1.3)
MONOCYTES NFR BLD AUTO: 6.8 %
NEUTROPHILS # BLD AUTO: 5.9 10E9/L (ref 1.6–8.3)
NEUTROPHILS NFR BLD AUTO: 64.1 %
NRBC # BLD AUTO: 0 10*3/UL
NRBC BLD AUTO-RTO: 0 /100
PLATELET # BLD AUTO: 170 10E9/L (ref 150–450)
RBC # BLD AUTO: 3.55 10E12/L (ref 3.8–5.2)
SPECIMEN EXP DATE BLD: NORMAL
WBC # BLD AUTO: 9.2 10E9/L (ref 4–11)

## 2019-03-15 PROCEDURE — 0064U ANTB TP TOTAL&RPR IA QUAL: CPT | Performed by: ADVANCED PRACTICE MIDWIFE

## 2019-03-15 PROCEDURE — 86901 BLOOD TYPING SEROLOGIC RH(D): CPT | Performed by: ADVANCED PRACTICE MIDWIFE

## 2019-03-15 PROCEDURE — 12000001 ZZH R&B MED SURG/OB UMMC

## 2019-03-15 PROCEDURE — 86850 RBC ANTIBODY SCREEN: CPT | Performed by: ADVANCED PRACTICE MIDWIFE

## 2019-03-15 PROCEDURE — G0463 HOSPITAL OUTPT CLINIC VISIT: HCPCS | Mod: ZF

## 2019-03-15 PROCEDURE — 86900 BLOOD TYPING SEROLOGIC ABO: CPT | Performed by: ADVANCED PRACTICE MIDWIFE

## 2019-03-15 PROCEDURE — 25000128 H RX IP 250 OP 636: Performed by: ADVANCED PRACTICE MIDWIFE

## 2019-03-15 PROCEDURE — 85025 COMPLETE CBC W/AUTO DIFF WBC: CPT | Performed by: ADVANCED PRACTICE MIDWIFE

## 2019-03-15 RX ORDER — OXYTOCIN 10 [USP'U]/ML
10 INJECTION, SOLUTION INTRAMUSCULAR; INTRAVENOUS
Status: DISCONTINUED | OUTPATIENT
Start: 2019-03-15 | End: 2019-03-16

## 2019-03-15 RX ORDER — IBUPROFEN 800 MG/1
800 TABLET, FILM COATED ORAL
Status: COMPLETED | OUTPATIENT
Start: 2019-03-15 | End: 2019-03-16

## 2019-03-15 RX ORDER — CEFAZOLIN SODIUM 1 G/3ML
1 INJECTION, POWDER, FOR SOLUTION INTRAMUSCULAR; INTRAVENOUS EVERY 8 HOURS
Status: DISCONTINUED | OUTPATIENT
Start: 2019-03-16 | End: 2019-03-16

## 2019-03-15 RX ORDER — OXYCODONE AND ACETAMINOPHEN 5; 325 MG/1; MG/1
1 TABLET ORAL
Status: DISCONTINUED | OUTPATIENT
Start: 2019-03-15 | End: 2019-03-16

## 2019-03-15 RX ORDER — NALOXONE HYDROCHLORIDE 0.4 MG/ML
.1-.4 INJECTION, SOLUTION INTRAMUSCULAR; INTRAVENOUS; SUBCUTANEOUS
Status: DISCONTINUED | OUTPATIENT
Start: 2019-03-15 | End: 2019-03-16

## 2019-03-15 RX ORDER — LIDOCAINE 40 MG/G
CREAM TOPICAL
Status: DISCONTINUED | OUTPATIENT
Start: 2019-03-15 | End: 2019-03-16

## 2019-03-15 RX ORDER — METHYLERGONOVINE MALEATE 0.2 MG/ML
200 INJECTION INTRAVENOUS
Status: DISCONTINUED | OUTPATIENT
Start: 2019-03-15 | End: 2019-03-16

## 2019-03-15 RX ORDER — CARBOPROST TROMETHAMINE 250 UG/ML
250 INJECTION, SOLUTION INTRAMUSCULAR
Status: DISCONTINUED | OUTPATIENT
Start: 2019-03-15 | End: 2019-03-16

## 2019-03-15 RX ORDER — ONDANSETRON 2 MG/ML
4 INJECTION INTRAMUSCULAR; INTRAVENOUS EVERY 6 HOURS PRN
Status: DISCONTINUED | OUTPATIENT
Start: 2019-03-15 | End: 2019-03-16

## 2019-03-15 RX ORDER — CEFAZOLIN SODIUM 2 G/100ML
2 INJECTION, SOLUTION INTRAVENOUS ONCE
Status: COMPLETED | OUTPATIENT
Start: 2019-03-15 | End: 2019-03-15

## 2019-03-15 RX ORDER — ACETAMINOPHEN 325 MG/1
650 TABLET ORAL EVERY 4 HOURS PRN
Status: DISCONTINUED | OUTPATIENT
Start: 2019-03-15 | End: 2019-03-16

## 2019-03-15 RX ORDER — AZITHROMYCIN 250 MG/1
TABLET, FILM COATED ORAL
Qty: 6 TABLET | Refills: 0 | Status: SHIPPED | OUTPATIENT
Start: 2019-03-15 | End: 2019-08-01

## 2019-03-15 RX ORDER — FENTANYL CITRATE 50 UG/ML
50-100 INJECTION, SOLUTION INTRAMUSCULAR; INTRAVENOUS
Status: DISCONTINUED | OUTPATIENT
Start: 2019-03-15 | End: 2019-03-16

## 2019-03-15 RX ORDER — OXYTOCIN/0.9 % SODIUM CHLORIDE 30/500 ML
100-340 PLASTIC BAG, INJECTION (ML) INTRAVENOUS CONTINUOUS PRN
Status: COMPLETED | OUTPATIENT
Start: 2019-03-15 | End: 2019-03-16

## 2019-03-15 RX ADMIN — CEFAZOLIN SODIUM 2 G: 2 INJECTION, SOLUTION INTRAVENOUS at 22:52

## 2019-03-15 ASSESSMENT — MIFFLIN-ST. JEOR
SCORE: 1474.75
SCORE: 1489.26

## 2019-03-15 NOTE — LETTER
"  RE: Brie Brown  [address] [facility] [address]     Dear Colleague,    Thank you for referring your patient, Brie Brown, to the [facility] at [facility]. Please see a copy of my visit note below.    Subjective:     33 year old  at 38w5d presents for routine prenatal visit.   Denies vaginal bleeding or leakage of fluid.  Endorses contractions, all week, lots of BH ctx that eventually subside.  Endorses fetal movement.        No HA, visual changes, RUQ or epigastric pain.   Patient concerns: Her last delivery was fast and her baby had issues due to inadequate GBS treatment in labor. Given her last cervical exams was 4.5/70/-2 she is worried that she will not make it in time to receive adequate treatment this time (also GBS+).  Ongoing issues with sinus infection x2 weeks, still having dark green mucous, requesting treatment. Feeling well overall.    Objective:  Vitals:    03/15/19 1131   BP: 117/73   BP Location: Left arm   Patient Position: Chair   Pulse: 102   Weight: 72.7 kg (160 lb 4.8 oz)   Height: 1.702 m (5' 7\")   See OB flowsheet    Assessment/Plan     Encounter Diagnosis   Name Primary?     Supervision of other normal pregnancy - [facility] CNM Yes     No orders of the defined types were placed in this encounter.    Orders Placed This Encounter   Medications     azithromycin (ZITHROMAX) 250 MG tablet     Si tablets first day, then one tablet daily for four days     Dispense:  6 tablet     Refill:  0     1 z-pack     - Reviewed low threshold to come in with frequent strong contractions, definition of active labor is 5cm and she is already close to that threshold.   - Reviewed why/how to contact provider if headache/visual changes/RUQ or epigastric pain, decreased fetal movement, vaginal bleeding, leakage of fluid or strong/regular contractions.   Patient education/orders or handouts today:  Sign/symptoms of labor and When to call for " labor or other concerns  - Return to clinic in 1 week and prn if questions or concerns.     Tahira Cortes CNM

## 2019-03-16 LAB — T PALLIDUM AB SER QL: NONREACTIVE

## 2019-03-16 PROCEDURE — 72200001 ZZH LABOR CARE VAGINAL DELIVERY SINGLE

## 2019-03-16 PROCEDURE — 25000125 ZZHC RX 250

## 2019-03-16 PROCEDURE — 25000132 ZZH RX MED GY IP 250 OP 250 PS 637: Performed by: ADVANCED PRACTICE MIDWIFE

## 2019-03-16 PROCEDURE — 25000128 H RX IP 250 OP 636: Performed by: ADVANCED PRACTICE MIDWIFE

## 2019-03-16 PROCEDURE — 12000001 ZZH R&B MED SURG/OB UMMC

## 2019-03-16 PROCEDURE — 10907ZC DRAINAGE OF AMNIOTIC FLUID, THERAPEUTIC FROM PRODUCTS OF CONCEPTION, VIA NATURAL OR ARTIFICIAL OPENING: ICD-10-PCS | Performed by: ADVANCED PRACTICE MIDWIFE

## 2019-03-16 RX ORDER — LIDOCAINE HYDROCHLORIDE 10 MG/ML
INJECTION, SOLUTION EPIDURAL; INFILTRATION; INTRACAUDAL; PERINEURAL
Status: DISCONTINUED
Start: 2019-03-16 | End: 2019-03-16 | Stop reason: HOSPADM

## 2019-03-16 RX ORDER — AMOXICILLIN 250 MG
1 CAPSULE ORAL 2 TIMES DAILY
Status: DISCONTINUED | OUTPATIENT
Start: 2019-03-16 | End: 2019-03-17 | Stop reason: HOSPADM

## 2019-03-16 RX ORDER — OXYTOCIN 10 [USP'U]/ML
INJECTION, SOLUTION INTRAMUSCULAR; INTRAVENOUS
Status: DISCONTINUED
Start: 2019-03-16 | End: 2019-03-16 | Stop reason: HOSPADM

## 2019-03-16 RX ORDER — AMOXICILLIN 250 MG
2 CAPSULE ORAL 2 TIMES DAILY
Status: DISCONTINUED | OUTPATIENT
Start: 2019-03-16 | End: 2019-03-17 | Stop reason: HOSPADM

## 2019-03-16 RX ORDER — ACETAMINOPHEN 325 MG/1
650 TABLET ORAL EVERY 4 HOURS PRN
Status: DISCONTINUED | OUTPATIENT
Start: 2019-03-16 | End: 2019-03-17 | Stop reason: HOSPADM

## 2019-03-16 RX ORDER — IBUPROFEN 800 MG/1
800 TABLET, FILM COATED ORAL EVERY 6 HOURS PRN
Status: DISCONTINUED | OUTPATIENT
Start: 2019-03-16 | End: 2019-03-17 | Stop reason: HOSPADM

## 2019-03-16 RX ORDER — HYDROCORTISONE 2.5 %
CREAM (GRAM) TOPICAL 3 TIMES DAILY PRN
Status: DISCONTINUED | OUTPATIENT
Start: 2019-03-16 | End: 2019-03-17 | Stop reason: HOSPADM

## 2019-03-16 RX ORDER — LANOLIN 100 %
OINTMENT (GRAM) TOPICAL
Status: DISCONTINUED | OUTPATIENT
Start: 2019-03-16 | End: 2019-03-17 | Stop reason: HOSPADM

## 2019-03-16 RX ORDER — OXYTOCIN/0.9 % SODIUM CHLORIDE 30/500 ML
100 PLASTIC BAG, INJECTION (ML) INTRAVENOUS CONTINUOUS
Status: DISCONTINUED | OUTPATIENT
Start: 2019-03-16 | End: 2019-03-17 | Stop reason: HOSPADM

## 2019-03-16 RX ORDER — OXYTOCIN 10 [USP'U]/ML
10 INJECTION, SOLUTION INTRAMUSCULAR; INTRAVENOUS
Status: DISCONTINUED | OUTPATIENT
Start: 2019-03-16 | End: 2019-03-17 | Stop reason: HOSPADM

## 2019-03-16 RX ORDER — NALOXONE HYDROCHLORIDE 0.4 MG/ML
.1-.4 INJECTION, SOLUTION INTRAMUSCULAR; INTRAVENOUS; SUBCUTANEOUS
Status: DISCONTINUED | OUTPATIENT
Start: 2019-03-16 | End: 2019-03-17 | Stop reason: HOSPADM

## 2019-03-16 RX ORDER — MISOPROSTOL 200 UG/1
TABLET ORAL
Status: DISCONTINUED
Start: 2019-03-16 | End: 2019-03-16 | Stop reason: HOSPADM

## 2019-03-16 RX ORDER — OXYTOCIN/0.9 % SODIUM CHLORIDE 30/500 ML
340 PLASTIC BAG, INJECTION (ML) INTRAVENOUS CONTINUOUS PRN
Status: DISCONTINUED | OUTPATIENT
Start: 2019-03-16 | End: 2019-03-17 | Stop reason: HOSPADM

## 2019-03-16 RX ORDER — BISACODYL 10 MG
10 SUPPOSITORY, RECTAL RECTAL DAILY PRN
Status: DISCONTINUED | OUTPATIENT
Start: 2019-03-18 | End: 2019-03-17 | Stop reason: HOSPADM

## 2019-03-16 RX ORDER — OXYTOCIN/0.9 % SODIUM CHLORIDE 30/500 ML
PLASTIC BAG, INJECTION (ML) INTRAVENOUS
Status: COMPLETED
Start: 2019-03-16 | End: 2019-03-16

## 2019-03-16 RX ADMIN — CEFAZOLIN 1 G: 1 INJECTION, POWDER, FOR SOLUTION INTRAMUSCULAR; INTRAVENOUS at 06:59

## 2019-03-16 RX ADMIN — CEFAZOLIN 1 G: 1 INJECTION, POWDER, FOR SOLUTION INTRAMUSCULAR; INTRAVENOUS at 15:12

## 2019-03-16 RX ADMIN — Medication 340 ML/HR: at 17:39

## 2019-03-16 RX ADMIN — SENNOSIDES AND DOCUSATE SODIUM 1 TABLET: 8.6; 5 TABLET ORAL at 20:21

## 2019-03-16 RX ADMIN — OXYTOCIN-SODIUM CHLORIDE 0.9% IV SOLN 30 UNIT/500ML 340 ML/HR: 30-0.9/5 SOLUTION at 17:39

## 2019-03-16 RX ADMIN — IBUPROFEN 800 MG: 800 TABLET, FILM COATED ORAL at 18:09

## 2019-03-16 NOTE — PROGRESS NOTES
Data: Patient presented to [facility] at 2119.   Reason for maternal/fetal assessment per patient is Rule Out Labor  .  Patient is a . Prenatal record reviewed.      Obstetric History       T1      L1     SAB1   TAB0   Ectopic0   Multiple0   Live Births1       # Outcome Date GA Lbr Max/2nd Weight Sex Delivery Anes PTL Lv   3 Current            2 Term 17 39w6d 06:30 / 00:40 3.629 kg (8 lb) M  None N LIA      Name: Sinan      Complications: GBS   1 SAB 16 11w0d    AB, COMPLETE         Obstetric Comments   Son went to NICU briefly for TTN, GBS pos. Small mediolateral episiotomy for decels.    Still breastfeeding her son at bedtime   No hx DM, HTN, PTL   . Medical history:   Past Medical History:   Diagnosis Date     Abnormal Pap smear     normals since     Anemia 2016    during last pregnancy     LSIL (low grade squamous intraepithelial lesion) on Pap smear 09     NO ACTIVE PROBLEMS    . Gestational Age 39w0d. VSS. Fetal movement present. Patient denies backache, vaginal discharge, pelvic pressure, UTI symptoms, GI problems, bloody show, vaginal bleeding, edema, headache, visual disturbances, epigastric or URQ pain, abdominal pain, rupture of membranes. C/o cramping and occasional contractions that started at around 1600. Support person present.  Action: Verbal consent for EFM. Triage assessment completed. EFM applied for fetal well-being. Uterine assessment done via TOCO. Fetal assessment: Presumed adequate fetal oxygenation documented (see flow record).   Response: NATALIE Montoya informed of pt arrival. SVE by provider /. Plan per provider is admit pt for antibiotics and labor. Patient verbalized agreement with plan. Patient remains in room 444 ambulatory, oriented to room and call light.

## 2019-03-16 NOTE — PLAN OF CARE
VSS. Pt is afebrile. C/o intermittent cramping, not antoine regularly. Able to rest overnight. IV antibiotics initiated for GBS prophylaxis. Pt unsure if wants to proceed with AROM. Will continue to monitor.

## 2019-03-16 NOTE — PROGRESS NOTES
"Labor progress note    S:  Patient didn't feel water was warm enough in the tub, now napping in bed.    O:  Blood pressure 109/70, pulse 113, temperature 98.7  F (37.1  C), temperature source Oral, resp. rate 18, height 1.727 m (5' 8\"), weight 72.6 kg (160 lb), last menstrual period 06/15/2018, not currently breastfeeding.  General appearance: comfortable.  Contractions: irregular, about every 5 minutes when ambulating  FHT: Baseline 140 via IA.  No decelerations present.  ROM: not ruptured.   Pelvic exam: deferred  Pitocin- none,  Antibiotics- Ancef      A:  IUP @ 39w1d advanced cervical dilitation   Fetal Heart rate tracing Category one  GBS- positive  Patient Active Problem List   Diagnosis     Papanicolaou smear of cervix with low grade squamous intraepithelial lesion (LGSIL)     Supervision of other normal pregnancy - [facility] CNM     Iron deficiency anemia due to chronic blood loss      contractions     Positive GBS test - PCN Allergy.  Clindamycin resistant - Needs Vancomycin     Anemia during pregnancy in third trimester     Labor and delivery, indication for care         P:   at bedside, patient napping.   Patient plans to ambulate again after nap  SANCHEZ Oh CNM  "

## 2019-03-16 NOTE — H&P
"ADMIT NOTE  =================  Brie Brown is a 33-year-old  female at 39w0d. Patient's last menstrual period was 06/15/2018. Estimated Date of Delivery: Mar 22, 2019 by LMP, c/w 8w5d ultrasound. [facility] at 10:29 PM in early labor.     HPI  ================  Brie has been experiencing frequent contractions for the past several weeks of pregnancy. This evening they became slightly stronger so she presented for evaluation given her GBS positive status and advanced cervical dilation in clinic today. She has not had any leaking of fluid or vaginal bleeding. Her baby is very active. No other concerns. Plans for an unmedicated birth, open to water birth.     Contractions- mild, q 4-7 minutes  Fetal movement- active  ROM- no   Vaginal bleeding- none  GBS- positive  FOB- is involved, Selemon  Other labor support-     Weight gain- 159 - 140 lbs, Total weight gain- 19 lbs  Height- 68\"  BMI- 21  First prenatal visit at 8 weeks, Total visits- 12    PROBLEM LIST  =================  Patient Active Problem List    Diagnosis Date Noted     Labor and delivery, indication for care 03/15/2019     Priority: Medium     Anemia during pregnancy in third trimester 2019     Priority: Medium     Positive GBS test - PCN Allergy.  Clindamycin resistant - Needs Vancomycin 2019     Priority: Medium     2019: GBS +, plan pcn prophylaxis in labor  Sensitivities: PCN sensitive, Clinda resistant  3/3/2019 - Verified with her mother that pt had full body rash with Augmentin as a child.  Vancoymin in labor. Pt agreeable              contractions 2019     Priority: Medium     Iron deficiency anemia due to chronic blood loss 2018     Priority: Medium     Supervision of other normal pregnancy - [facility] CN 08/15/2018     Priority: Medium     18- 1st trimester screening wnl, placenta posterior  Level 2 us scheduled for 10/19       Papanicolaou smear of cervix " with low grade squamous intraepithelial lesion (LGSIL) 2011     Priority: Medium     In  at [facility].  One abnormal then normal colpo.    NIL paps: 3/10, , 6/12  2/15/16: pap NILM, repeat pap postpartum [ ]         HISTORIES  ============  Allergies   Allergen Reactions     Augmentin      High fever as a child     Seasonal Allergies      Past Medical History:   Diagnosis Date     Abnormal Pap smear     normals since     Anemia 2016    during last pregnancy     LSIL (low grade squamous intraepithelial lesion) on Pap smear 09     NO ACTIVE PROBLEMS      Past Surgical History:   Procedure Laterality Date     ENT SURGERY      Tonsils and adenoids removed when very young   .  Family History   Problem Relation Age of Onset     Lipids Mother      Hypertension Father      Depression Father      Lipids Father      Heart Disease Paternal Grandfather         passsed away from MI     Prostate Cancer Paternal Grandfather      Cerebrovascular Disease Paternal Grandmother      Alcohol/Drug Paternal Grandmother      Arthritis Maternal Grandmother      Depression Sister      Social History     Tobacco Use     Smoking status: Never Smoker     Smokeless tobacco: Never Used   Substance Use Topics     Alcohol use: No     Alcohol/week: 0.0 oz     Comment: socially     Obstetric History       T1      L1     SAB1   TAB0   Ectopic0   Multiple0   Live Births1       # Outcome Date GA Lbr Max/2nd Weight Sex Delivery Anes PTL Lv   3 Current            2 Term 17 39w6d 06:30 / 00:40 3.629 kg (8 lb) M  None N LIA      Name: Sinan      Complications: GBS   1 SAB 16 11w0d    AB, COMPLETE         Obstetric Comments   Son went to NICU briefly for TTN, GBS pos. Small mediolateral episiotomy for decels.    Still breastfeeding her son at bedtime   No hx DM, HTN, PTL        LABS:   ===========  Prenatal Labs:  Rhogam not indicated   Lab Results   Component Value Date    ABO A 2019    RH Pos  "02/22/2019    AS Neg 02/22/2019    HEPBANG Nonreactive 08/16/2018    TREPAB Negative 08/04/2016    RUQIGG 58 08/16/2018    HGB 11.8 03/01/2019    HIV Negative 07/11/2007     Rubella immune  Lab Results   Component Value Date    GBS Positive 02/22/2019     Other labs:  No results found for this or any previous visit (from the past 24 hour(s)).    ROS  =========  Pt denies significant respiratory, cardiovacular, GI, or muscular/skeletalcomplaints.    See RN data base ROS.     PHYSICAL EXAM:  ===============  /74   Pulse 113   Temp 98.5  F (36.9  C) (Oral)   Resp 18   Ht 1.727 m (5' 8\")   Wt 72.6 kg (160 lb)   LMP 06/15/2018   BMI 24.33 kg/m    General appearance: uncomfortable with contractions  GENERAL APPEARANCE: healthy, alert and no distress  RESP: lungs clear to auscultation - no rales, rhonchi or wheezes  CV: regular rates and rhythm, normal S1 S2, no S3 or S4 and no murmur,and no varicosities  ABDOMEN:  soft, nontender, no epigastric pain  SKIN: no suspicious lesions or rashes  NEURO: Denies headache, blurred vision, other vision changes  PSYCH: mentation appears normal. and affect normal/bright  Legs: No edema     Abdomen: gravid, vertex fetus per Leopold's, non-tender between contractions.   Cephalic presentation confirmed by BSUS  EFW-  7.5 lbs.   CONTACTIONS: mild and every 4-7 minutes  FETAL HEART TONES: continuous EFM- baseline 125 with moderate variability and positive accelerations. No decelerations.  PELVIC EXAM: 6/ 80%/ Posterior/ soft/ -2   BLOODY SHOW: no   ROM:no  FLUID: none  ROMPLUS: not done    # Pain Assessment:  Current Pain Score 2/23/2019   Patient currently in pain? denies   - Brie is experiencing pain due to early labor contractions. Pain management was discussed with Brie and her significant other and the plan was created in a collaborative fashion.  Brie's response to the current recommendations: engaged  - Brie plans for hydrotherapy prn. "     ASSESSMENT:  ==============  IUP @ 39w0d admitted in early labor   NST REACTIVE  Fetal Heart Rate - category one  GBS- positive    Patient Active Problem List   Diagnosis     Papanicolaou smear of cervix with low grade squamous intraepithelial lesion (LGSIL)     Supervision of other normal pregnancy - [facility]M     Iron deficiency anemia due to chronic blood loss      contractions     Positive GBS test - PCN Allergy.  Clindamycin resistant - Needs Vancomycin     Anemia during pregnancy in third trimester     Labor and delivery, indication for care     PLAN:  ===========  Admit - see IP orders  Pain medication options reviewed. Pt is interested in hydrotherapy  Prophylactic IV antibiotic for positive GBS status reviewed with pt. Agreeable to Ancef. Discussed with Dr. Meier and PharmD who agree that since Brie's reaction to Augmentin as a child would be considered mild, the risk for cross reactivity is small. This was discussed with Brie and she was also offered to receive Vancomycin but would prefer Ancef. Will closely monitor for reaction.   Expectant management of labor  Brie was encouraged to sleep/rest as she is able   Anticipate     SANCHEZ Pugh CNM

## 2019-03-16 NOTE — PROGRESS NOTES
"Labor progress note    S:  Patient has been resting, now up ambulating.  Reports feelings that she would like labor to progress.  Now receptive to amniotomy.    O:  Blood pressure 113/68, pulse 113, temperature 98  F (36.7  C), temperature source Oral, resp. rate 18, height 1.727 m (5' 8\"), weight 72.6 kg (160 lb), last menstrual period 06/15/2018, not currently breastfeeding.  General appearance: comfortable.  Contractions: Every 10 minutes. 60 seconds duration.  Palpate: moderate.  FHT: Baseline 130 with moderate variability. Accelerations are present. No decelerations present.  ROM: amniotomy performed for small amount of clear fluid.   Pelvic exam: / Mid/ average/ 0  Pitocin- none,  Antibiotics- Ancef-last at 1500      A:  IUP @ 39w1d minimal/no progress and advanced cervical dilitation   Fetal Heart rate tracing Category category one  GBS- positive  Patient Active Problem List   Diagnosis     Papanicolaou smear of cervix with low grade squamous intraepithelial lesion (LGSIL)     Supervision of other normal pregnancy - [facility]M     Iron deficiency anemia due to chronic blood loss      contractions     Positive GBS test - PCN Allergy.  Clindamycin resistant - Needs Vancomycin     Anemia during pregnancy in third trimester     Labor and delivery, indication for care         P:  comfort measures prn   Anticipate   reevaluate in 2-4 hours/PRN   SANCHEZ Oh CNM  "

## 2019-03-16 NOTE — PROGRESS NOTES
"Labor Progress Note    S: Brie continues to feel mild cramping and is able to rest in between contractions.     O:  Blood pressure 116/61, pulse 113, temperature 97.9  F (36.6  C), temperature source Axillary, resp. rate 18, height 1.727 m (5' 8\"), weight 72.6 kg (160 lb), last menstrual period 06/15/2018, not currently breastfeeding.  General appearance: comfortable.  Contractions: Irregular and mild Soft resting tone.   FHT: IA baseline 130, normal rhythm, Increases present, no decreases present  ROM: not ruptured.   Pelvic exam: Deferred    Pitocin- none,  Antibiotics- Ancef q 8    A:  33 year old  with IUP @ 39w1d early labor   Fetal Heart rate tracing category one  GBS- positive; adequately treated    Patient Active Problem List   Diagnosis     Papanicolaou smear of cervix with low grade squamous intraepithelial lesion (LGSIL)     Supervision of other normal pregnancy - [facility]M     Iron deficiency anemia due to chronic blood loss      contractions     Positive GBS test - PCN Allergy.  Clindamycin resistant - Needs Vancomycin     Anemia during pregnancy in third trimester     Labor and delivery, indication for care     P:  -Continue expectant mgmt overnight.   -Continue Ancef q 8 hours for GBS prophylaxis  -Comfort measures prn  -Revisit option for AROM for labor augmentation in am.  -Reassess in 4 hours, sooner prn.    Lindsay Jean CNM, APRN    "

## 2019-03-16 NOTE — PROGRESS NOTES
"Labor progress note    S:  Patient reports that contractions are feeling more intense, continue to be irregular.  Requesting cervical exam at this time as she is feeling increased pelvic pressure. Patient would like water birth tub filled up at this time.    O:  Blood pressure 109/70, pulse 113, temperature 98.7  F (37.1  C), temperature source Oral, resp. rate 18, height 1.727 m (5' 8\"), weight 72.6 kg (160 lb), last menstrual period 06/15/2018, not currently breastfeeding.  General appearance: comfortable.  Contractions: Every 6-8 minutes. 40-70 seconds duration.  Palpate: moderate.  FHT: Baseline 135 via IA. No decelerations present.  ROM: not ruptured.   Pelvic exam: 7/ 80%/ Mid/ average/ 0  Pitocin- none,  Antibiotics- Ancef      A:  IUP @ 39w1d minimal/no progress and advanced cervical dilitation   Fetal Heart rate tracing Category category one  GBS- positive  Patient Active Problem List   Diagnosis     Papanicolaou smear of cervix with low grade squamous intraepithelial lesion (LGSIL)     Supervision of other normal pregnancy - [facility]M     Iron deficiency anemia due to chronic blood loss      contractions     Positive GBS test - PCN Allergy.  Clindamycin resistant - Needs Vancomycin     Anemia during pregnancy in third trimester     Labor and delivery, indication for care         P:  Discussed lack of cervical change and irregular contractions, offered amniotomy again-patient declines  Discussed that she needs to be making cervical change for water birth tub to be filled, will try hydrotherapy in bathtub at this time.   now present and offering support and encouraging frequent position changes.  SANCHEZ Oh CNM  "

## 2019-03-16 NOTE — PROGRESS NOTES
"Labor Progress Note    S: Brie has been able to sleep. Her contractions feel more frequent but are still mild and feel like low abdominal cramping. Brie's  remains at the bedside. Brie requested to discuss AROM in more detail.    O:  Blood pressure 109/74, pulse 113, temperature 98.4  F (36.9  C), temperature source Oral, resp. rate 18, height 1.727 m (5' 8\"), weight 72.6 kg (160 lb), last menstrual period 06/15/2018, not currently breastfeeding.  General appearance: comfortable.  Contractions: Irregular and mild.  Palpate: mild.  Soft resting tone.   FHT: IA baseline 125, regular rhythm, increases heard, no decreases heard  ROM: not ruptured.  Pelvic exam: Deferred    Pitocin- none,  Antibiotics- Ancef dose #1 at 2300    A:  33 year old  with IUP @ 39w1d early v prodromal labor   Fetal Heart rate tracing normal IA  GBS- positive    Patient Active Problem List   Diagnosis     Papanicolaou smear of cervix with low grade squamous intraepithelial lesion (LGSIL)     Supervision of other normal pregnancy - [facility]     Iron deficiency anemia due to chronic blood loss      contractions     Positive GBS test - PCN Allergy.  Clindamycin resistant - Needs Vancomycin     Anemia during pregnancy in third trimester     Labor and delivery, indication for care     P:  -Discussed r/b of AROM with Brie and More. Reviewed that AROM would be an appropriate tool for augmenting labor at least 4 hours after her first dose of Ancef. Donnie are undecided at this time but will discuss.   -Comfort measures prn  -Encouraged rest prn overnight  -Reassess in 4 hours, sooner prn.    Lindsay Jean CNM, APRN  "

## 2019-03-16 NOTE — PLAN OF CARE
Pt reports occas crampy contractions, no LOF, active baby. Awaiting labor to start. Enc activity, breakfast. Awaiting CNM to discuss plan for day. Pt enc to call with needs.

## 2019-03-16 NOTE — PROGRESS NOTES
"Labor progress note    S:  Patient continues to notice irregular contractions with cramping.  Requesting cervical exam at this time.      O:  Blood pressure 109/70, pulse 113, temperature 98.7  F (37.1  C), temperature source Oral, resp. rate 18, height 1.727 m (5' 8\"), weight 72.6 kg (160 lb), last menstrual period 06/15/2018, not currently breastfeeding.  General appearance: comfortable.  Contractions: irregular per palpation moderate.  FHT: Baseline 150 with IA. No decelerations present.  ROM: not ruptured.   Pelvic exam:  posterior soft, membranes swept with patient consent  Pitocin- none,  Antibiotics- Ancef-next dose due at 1500      A:  IUP @ 39w1d early labor and advanced cervical dilitation   Fetal Heart rate tracing Category category one  GBS- positive  Patient Active Problem List   Diagnosis     Papanicolaou smear of cervix with low grade squamous intraepithelial lesion (LGSIL)     Supervision of other normal pregnancy - [facility]M     Iron deficiency anemia due to chronic blood loss      contractions     Positive GBS test - PCN Allergy.  Clindamycin resistant - Needs Vancomycin     Anemia during pregnancy in third trimester     Labor and delivery, indication for care         P:  Prophylactic antibiotic for + GBS status   Long discussion about amniotomy risks and benefits.  Patient desires to delay amniotomy at this time  Will continue up right positioning and frequent position changes to facilitate labor  Patient to consider amniotomy in 1-2 hours if contractions have not increased in frequency/intensity  SANCHEZ Oh CNM  "

## 2019-03-16 NOTE — PLAN OF CARE
Pt using sling, birth ball, amb, lunging, swaying. Reports increasing intensity of ctx tho still irreg and talking thru them. Currently amb in guillen with . Exp mgt.

## 2019-03-17 VITALS
BODY MASS INDEX: 24.25 KG/M2 | SYSTOLIC BLOOD PRESSURE: 111 MMHG | HEIGHT: 68 IN | DIASTOLIC BLOOD PRESSURE: 70 MMHG | RESPIRATION RATE: 16 BRPM | WEIGHT: 160 LBS | HEART RATE: 67 BPM | TEMPERATURE: 97.8 F

## 2019-03-17 LAB — HGB BLD-MCNC: 10.5 G/DL (ref 11.7–15.7)

## 2019-03-17 PROCEDURE — 85018 HEMOGLOBIN: CPT | Performed by: ADVANCED PRACTICE MIDWIFE

## 2019-03-17 PROCEDURE — 36415 COLL VENOUS BLD VENIPUNCTURE: CPT | Performed by: ADVANCED PRACTICE MIDWIFE

## 2019-03-17 PROCEDURE — 25000132 ZZH RX MED GY IP 250 OP 250 PS 637: Performed by: ADVANCED PRACTICE MIDWIFE

## 2019-03-17 RX ADMIN — SENNOSIDES AND DOCUSATE SODIUM 2 TABLET: 8.6; 5 TABLET ORAL at 08:57

## 2019-03-17 RX ADMIN — IBUPROFEN 800 MG: 800 TABLET, FILM COATED ORAL at 12:18

## 2019-03-17 RX ADMIN — IBUPROFEN 800 MG: 800 TABLET, FILM COATED ORAL at 06:11

## 2019-03-17 RX ADMIN — IBUPROFEN 800 MG: 800 TABLET, FILM COATED ORAL at 18:36

## 2019-03-17 RX ADMIN — ACETAMINOPHEN 650 MG: 325 TABLET, FILM COATED ORAL at 11:02

## 2019-03-17 RX ADMIN — IBUPROFEN 800 MG: 800 TABLET, FILM COATED ORAL at 00:08

## 2019-03-17 NOTE — PLAN OF CARE
VSS. One low BP, provider aware. LS CTA. Pt denies headache, dizziness or changes to vision. Breasts are soft, nipples intact. BS+, flatus+, BM- since delivery. Patient is taking stool softeners. Pt is voiding independently and with out difficulty. FF@ U/2, midline. Perineum is swollen and sore. Pt applies tucks pads and uses the marianne bottle. Scant amount of rubra lochia. Pt denies pain in legs, no clonus or edema. Pt c/o perineal discomfort. Patient is taking ibuprofen to manage her pain. Patient is loving towards her , is attentive to  needs and is active in  cares. FOB is supportive and at bedside.

## 2019-03-17 NOTE — PROVIDER NOTIFICATION
03/17/19 0116 03/17/19 0118   Provider Notification   Provider Name/Title NATALIE Valentine CNM   Method of Notification Electronic Page Phone   Request Evaluate-Remote Evaluate-Remote   Notification Reason Vital Signs Change  (low BP, denies dizziness, bleeding stable) Vital Signs Change   Lindsay Jean CNM, paged and notified that this patient was experiencing low blood pressure with her last assessment at 0011. BP 93/51, HR 82, T 98.4. This RN reported that the patient denies dizziness, bleeding is WNL. WALDO Jean CNM, called this RN and stated that she was not concerned at this point in time but to report back to her if bleeding increases, vitals/condition worsens. This RN will continue to monitor.

## 2019-03-17 NOTE — PROGRESS NOTES
Data: Brie Brown transferred to 73 via wheelchair at 1945. Baby transferred via parent's arms.  Action: This receiving unit notified of transfer: Yes. Report received from Veronique MAR RN at 2000 at bedside. Belongings accompanied patient to this receiving unit. Accompanied by Registered Nurse. Oriented patient to surroundings. Call light within reach. ID bands double-checked with labor RN.  Response: Patient tolerated transfer and is stable.

## 2019-03-17 NOTE — PLAN OF CARE
Patient independently breastfeeding infant. Reporting adequate pain control with tylenol and ibuprofen. Hoping to discharge this evening.

## 2019-03-17 NOTE — L&D DELIVERY NOTE
Delivery Summary    Brie Brown MRN# [id number]   Age: 33 year old YOB: 1985     ASSESSMENT & PLAN:   DELIVERY NOTE:  Brief Labor Course: Patient presented to labor and delivery with irregular contractions and advanced cervical dilation.  Had no cervical change for 5+ hours before giving consent for amniotomy.    Delivery Note:   Patient began feeling significantly more uncomfortable with regular contractions shortly after amniotomy.  Used Nitrous Oxide for comfort while water birth tub was being filled.  Patient had strong urge to bear down at 1710 and was noted to be complete.  Began active pushing efforts on hands and knees, then to squatting position in water birth tub.  Steady progress made as infant brought to a crown, shoulders delivered easily with maternal effort.  Infant brought out of water and placed on mother's chest.  Infant vigorous with stimulation with good tone and making spontaneous breathing efforts.  Mother assisted out of tub while holding infant and placed on bed.  Cord was doubly clamped and cut by father of the baby.  Placenta delivered spontaneously, intact with 3 vessel cord.  IV Pitocin infusing.  Perineum inspected, small hemostatic abrasions noted at introitus and at left marianne urethra.  Fundus firm, mother and infant stable in recovery    IUP at 39 weeks gestation delivered on 2019.     delivery of a viable Female infant.  Weight : 8 pounds 10 ounces   Apgars of 7 at 1 minute and 9 at 5 minutes.  Labor was spontaneous.  Medications administered  in labor:  Pain Rx Nitrous Oxide; Antibiotics Yes: Ancef and IV antibiotics infused greater than 4 hours;  Perineum: Minor laceration - No repair  Placenta-mechanism: spontaneous, intact,  with a 3 vessel cord. IV oxytocin was given After delivery of baby Before delivery of placenta  Quantitative Blood Loss was 500cc.  Complications of labor and delivery: None  Anticipated Discharge Date: 2019  Birth  attendants: NATALIE Aguilera APRN CNM       Labor Event Times    Labor onset date:  3/16/19 Onset time:   9:00 AM   Dilation complete date:  3/16/19 Complete time:   5:10 PM   Start pushing date/time:  3/16/2019 1715      Labor Length    1st Stage (hrs):  8 (min):  10   2nd Stage (hrs):  0 (min):  21   3rd Stage (hrs):  0 (min):  7      Labor Events     labor?:  No   steroids:  Full Course  Labor Type:  Augmentation  Predominate monitoring during 1st stage:  intermittent auscultation     Antibiotics received during labor?:  Yes  Reason for Antibiotics:  GBS  Antibiotics received for GBS:  Cefazolin  Antibiotics Given (GBS):  Greater than 4 hours prior to delivery     Rupture identifier:  Rupture 1  Rupture date/time: 3/16/19 1554   Rupture type:  Artificial Rupture of Membranes  Fluid color:  Clear     Augmentation:  AROM  Indications for augmentation:  Ineffective Contraction Pattern     Delivery/Placenta Date and Time    Delivery Date:  3/16/19 Delivery Time:   5:31 PM   Placenta Date/Time:  3/16/2019  5:38 PM  Oxytocin given at the time of delivery:  after delivery of baby     Vaginal Counts     Initial count performed by 2 team members:   Two Team Members   Veronique Larson CNM       Novelty Suture Novelty Sponges Instruments   Initial counts 2 0 5    Added to count 0 0 0    Final counts 2 0 5    Placed during labor Accounted for at the end of labor   No    No    No     Final count performed by 2 team members:   Two Team Members   Veronique Larson CNM      Final count correct?:  Yes     Apgars    Living status:  Living   1 Minute 5 Minute 10 Minute 15 Minute 20 Minute   Skin color: 0  1       Heart rate: 2  2       Reflex irritability: 1  2       Muscle tone: 2  2       Respiratory effort: 2  2       Total: 7  9       Apgars assigned by:  YAMILE LOFTON RN     Cord    Vessels:  3 Vessels Complications:  None   Cord Blood Disposition:  Lab Gases  "Sent?:  No      Pimento Resuscitation    Methods:  None   Care at Delivery:   girl at 1731. Applied to mother's abdomen. Dried and stimulated to cry. Secretions cleared with bulb suction.  Output in Delivery Room:  Voided      Measurements    Weight:  8 lb 10.3 oz Length:  1' 8\"   Head circumference:  34.3 cm       Skin to Skin and Feeding Plan    Skin to skin initiation date/time: 1/3/1841    Skin to skin with:  Mother  Skin to skin end date/time:     Breastfeeding initiated date/time:  3/16/2019 1830  How do you plan to feed your baby:  Breastfeeding     Labor Events and Shoulder Dystocia    Fetal Tracing Prior to Delivery:  Category 1  Shoulder dystocia present?:  Neg     Delivery (Maternal) (Provider to Complete) ([id number])    Episiotomy:  None  Perineal lacerations:  None    Periurethral laceration:  left       Blood Loss  Mother: Brie Brown #[id number]   Start of Mother's Information    IO Blood Loss  19 0900 - 19 0825    Total QBL Blood Loss (mL) Hospital Encounter 500 mL    Total  500 mL         End of Mother's Information  Mother: Brie Brown #[id number]         Delivery - Provider to Complete ([id number])    Delivering clinician:  Maria A Larson APRN CNM  CNM Care:  Exclusive CNM care in labor  Attempted Delivery Types (Choose all that apply):  Spontaneous Vaginal Delivery, Waterbirth  Delivery Type (Choose the 1 that will go to the Birth History):  Waterbirth          Placenta    Delayed Cord Clamping:  Done  Date/Time:  3/16/2019  5:38 PM  Removal:  Spontaneous  Comments:  Via Schultze mechanism with 3 vessel cord  Disposition:  Hospital disposal     Anesthesia    Method:  Nitrous Oxide          Presentation and Position    Presentation:  Vertex  Position:  Middle Occiput Anterior           SANCHEZ Oh CNM  "

## 2019-03-17 NOTE — DISCHARGE SUMMARY
[facility] Discharge Summary    Brie Brown MRN# [id number]   Age: 33 year old YOB: 1985     Date of Admission:  3/15/2019  Date of Discharge::  3/17/2019  Admitting Physician:  SANCHEZ Oh CNM  Discharge Physician:  SANCHEZ Oh CNM      Home clinic: [facility]          Admission Diagnoses:   Maternity *EEDD: 2019 Labor  Labor and delivery, indication for care   (normal spontaneous vaginal delivery)          Discharge Diagnosis:   Normal spontaneous vaginal delivery  Intrauterine pregnancy at 39 weeks gestation          Procedures:   Procedure(s): No additional procedures performed       No significant procedures performed during this admission           Medications Prior to Admission:     Medications Prior to Admission   Medication Sig Dispense Refill Last Dose     ascorbic acid (VITAMIN C) 250 MG CHEW chewable tablet Take 1 tablet (250 mg) by mouth daily 90 tablet 0 3/15/2019 at Unknown time     azithromycin (ZITHROMAX) 250 MG tablet 2 tablets first day, then one tablet daily for four days 6 tablet 0 3/15/2019 at Unknown time     ferrous sulfate (FE TABS) 325 (65 Fe) MG EC tablet Take 1 tablet (325 mg) by mouth daily 90 tablet 3 3/15/2019 at Unknown time     Prenatal Multivit-Min-Fe-FA (PRENATAL VITAMINS PO)    3/15/2019 at Unknown time     vitamin B-12 (CYANOCOBALAMIN) 1000 MCG tablet Take 1 tablet (1,000 mcg) by mouth daily 90 tablet 3 3/15/2019 at Unknown time     doxylamine (UNISOM) 25 MG TABS tablet Take 25 mg by mouth At Bedtime   More than a month at Unknown time     Misc. Devices (BREAST PUMP) MISC 1 each daily 1 each 0      order for DME Maternity Belt order 1 each 0      polyethylene glycol (MIRALAX/GLYCOLAX) packet Take 1 packet by mouth daily   More than a month at Unknown time     Pyridoxine HCl (VITAMIN B6 PO)    More than a month at Unknown time             Discharge Medications:     Current Discharge Medication List       CONTINUE these medications which have NOT CHANGED    Details   ascorbic acid (VITAMIN C) 250 MG CHEW chewable tablet Take 1 tablet (250 mg) by mouth daily  Qty: 90 tablet, Refills: 0    Associated Diagnoses: Supervision of other normal pregnancy, antepartum; Iron deficiency anemia secondary to inadequate dietary iron intake      azithromycin (ZITHROMAX) 250 MG tablet 2 tablets first day, then one tablet daily for four days  Qty: 6 tablet, Refills: 0    Comments: 1 z-pack  Associated Diagnoses: Supervision of other normal pregnancy, antepartum      ferrous sulfate (FE TABS) 325 (65 Fe) MG EC tablet Take 1 tablet (325 mg) by mouth daily  Qty: 90 tablet, Refills: 3    Associated Diagnoses: Supervision of other normal pregnancy, antepartum; Iron deficiency anemia secondary to inadequate dietary iron intake      Prenatal Multivit-Min-Fe-FA (PRENATAL VITAMINS PO)       vitamin B-12 (CYANOCOBALAMIN) 1000 MCG tablet Take 1 tablet (1,000 mcg) by mouth daily  Qty: 90 tablet, Refills: 3    Associated Diagnoses: Supervision of other normal pregnancy, antepartum; Iron deficiency anemia secondary to inadequate dietary iron intake      doxylamine (UNISOM) 25 MG TABS tablet Take 25 mg by mouth At Bedtime      Misc. Devices (BREAST PUMP) MISC 1 each daily  Qty: 1 each, Refills: 0    Associated Diagnoses: Postpartum care and examination of lactating mother      order for [facility] Maternity Belt order  Qty: 1 each, Refills: 0    Associated Diagnoses: Supervision of other normal pregnancy, antepartum; Round ligament pain      polyethylene glycol (MIRALAX/GLYCOLAX) packet Take 1 packet by mouth daily      Pyridoxine HCl (VITAMIN B6 PO)                    Consultations:   No consultations were requested during this admission          Brief History of Labor:   Brief Labor Course: Patient presented to labor and delivery with irregular contractions and advanced cervical dilation.  Had no cervical change for 5+ hours before giving consent for  amniotomy.    Delivery Note:   Patient began feeling significantly more uncomfortable with regular contractions shortly after amniotomy.  Used Nitrous Oxide for comfort while water birth tub was being filled.  Patient had strong urge to bear down at 1710 and was noted to be complete.  Began active pushing efforts on hands and knees, then to squatting position in water birth tub.  Steady progress made as infant brought to a crown, shoulders delivered easily with maternal effort.  Infant brought out of water and placed on mother's chest.  Infant vigorous with stimulation with good tone and making spontaneous breathing efforts.  Mother assisted out of tub while holding infant and placed on bed.  Cord was doubly clamped and cut by father of the baby.  Placenta delivered spontaneously, intact with 3 vessel cord.  IV Pitocin infusing.  Perineum inspected, small hemostatic abrasions noted at introitus and at left marianne urethra.  Fundus firm, mother and infant stable in recovery    IUP at 39 weeks gestation delivered on 2019.     delivery of a viable Female infant.  Weight : 8 pounds 10 ounces   Apgars of 7 at 1 minute and 9 at 5 minutes.  Labor was spontaneous.  Medications administered  in labor:  Pain Rx Nitrous Oxide; Antibiotics Yes: Ancef and IV antibiotics infused greater than 4 hours;  Perineum: Minor laceration - No repair  Placenta-mechanism: spontaneous, intact,  with a 3 vessel cord. IV oxytocin was given After delivery of baby Before delivery of placenta  Quantitative Blood Loss was 500cc.  Complications of labor and delivery: None  Anticipated Discharge Date: 2019  Birth attendants: NATALIE Aguilera APRN CNM         Assessment Day of Discharge      Breasts:soft filling  Nipples:intact  Fundus:firm -2/u  Abdomen: soft  Lochia: minimal  Perineum:no edema or bruising  Legs:no edema           Hospital Course:   The patient's hospital course was unremarkable.  On discharge,  her pain was well controlled. Vaginal bleeding is similar to peak menstrual flow.  Voiding without difficulty.  Ambulating well and tolerating a normal diet.  No fever.  Breastfeeding well.  Infant is stable.  No bowel movement yet. She was discharged on post-partum day #1.    Post-partum hemoglobin:   Hemoglobin   Date Value Ref Range Status   03/17/2019 10.5 (L) 11.7 - 15.7 g/dL Final             Discharge Instructions and Follow-Up:   Discharge diet: Regular   Discharge activity: Pelvic rest: abstain from intercourse and do not use tampons for 6 week(s)   Discharge follow-up: Follow up with NATALIE in 2 weeks   Wound care: Drink plenty of fluids           Discharge Disposition:   Discharged to home        SANCHEZ Oh CNM

## 2019-03-17 NOTE — PLAN OF CARE
Data: Brie Brown transferred to 73 via wheelchair at 1945. Baby transferred via parent's arms.  Action: Receiving unit notified of transfer: Yes. Patient and family notified of room change. Report given to PAMELA Clark at bedside. Belongings sent to receiving unit. Accompanied by Registered Nurse. Oriented patient to surroundings. Call light within reach. ID bands double-checked with receiving RN.  Response: Patient tolerated transfer and is stable.

## 2019-03-17 NOTE — PLAN OF CARE
AROM clear at 1554. Pt began using nitrous and then got in water birthing tub. Complete at 1710. Began pushing at 1715.  at 1731. Placenta at 1738. . No repair done. Ibuprofen given at 1809. VSS. Fundus midline, firm, U1. Will continue to monitor.

## 2019-03-18 NOTE — PLAN OF CARE
Data: Vital signs within normal limits. Postpartum checks within normal limits - see flow record. Patient eating and drinking normally. Patient able to empty bladder independently and is up ambulating. Patient performing self cares and is able to care for infant, breastfeeding baby on demand. Encouraging hand expression after feedings.   Action: Pain has been adequately managed with oral pain medications. Patient took a warm sitz bath for comfort. Patient declines breast pump, reports having one at home. Birth certificate and postpartum depression screen turned in completed. Discharge instructions reviewed with patient, reviewed need for follow-up in clinic. Reviewed discharge education video with patient and spouse. Reviewed breastfeeding resources in family folder. Patient not discharging with medications. AVS signed and copy given to patient. No questions or concerns from patient.   Response: Positive attachment behaviors observed with infant. Support person, , present.   Plan: Patient discharged home with baby at 1930.

## 2019-03-20 ENCOUNTER — TELEPHONE (OUTPATIENT)
Dept: OBGYN | Facility: CLINIC | Age: 34
End: 2019-03-20

## 2019-03-20 NOTE — TELEPHONE ENCOUNTER
Pt calling to inquire where she can send [facility] paperwork. Advised to send to [facility] email address.

## 2019-04-03 ENCOUNTER — TELEPHONE (OUTPATIENT)
Dept: OBGYN | Facility: CLINIC | Age: 34
End: 2019-04-03

## 2019-04-24 ENCOUNTER — OFFICE VISIT (OUTPATIENT)
Dept: OBGYN | Facility: CLINIC | Age: 34
End: 2019-04-24
Attending: ADVANCED PRACTICE MIDWIFE
Payer: COMMERCIAL

## 2019-04-24 VITALS
HEIGHT: 68 IN | WEIGHT: 150.8 LBS | HEART RATE: 67 BPM | DIASTOLIC BLOOD PRESSURE: 72 MMHG | SYSTOLIC BLOOD PRESSURE: 105 MMHG | BODY MASS INDEX: 22.85 KG/M2

## 2019-04-24 DIAGNOSIS — Z12.4 SCREENING FOR MALIGNANT NEOPLASM OF CERVIX: ICD-10-CM

## 2019-04-24 PROBLEM — O99.013 ANEMIA DURING PREGNANCY IN THIRD TRIMESTER: Status: RESOLVED | Noted: 2019-03-03 | Resolved: 2019-04-24

## 2019-04-24 PROBLEM — Z34.80 SUPERVISION OF OTHER NORMAL PREGNANCY, ANTEPARTUM: Status: RESOLVED | Noted: 2018-08-15 | Resolved: 2019-04-24

## 2019-04-24 PROBLEM — O47.00 PRETERM CONTRACTIONS: Status: RESOLVED | Noted: 2019-02-22 | Resolved: 2019-04-24

## 2019-04-24 PROBLEM — D50.0 IRON DEFICIENCY ANEMIA DUE TO CHRONIC BLOOD LOSS: Status: RESOLVED | Noted: 2018-12-28 | Resolved: 2019-04-24

## 2019-04-24 PROBLEM — B95.1 POSITIVE GBS TEST: Status: RESOLVED | Noted: 2019-02-23 | Resolved: 2019-04-24

## 2019-04-24 PROCEDURE — G0145 SCR C/V CYTO,THINLAYER,RESCR: HCPCS | Performed by: ADVANCED PRACTICE MIDWIFE

## 2019-04-24 PROCEDURE — 87624 HPV HI-RISK TYP POOLED RSLT: CPT | Performed by: ADVANCED PRACTICE MIDWIFE

## 2019-04-24 ASSESSMENT — ANXIETY QUESTIONNAIRES
7. FEELING AFRAID AS IF SOMETHING AWFUL MIGHT HAPPEN: NOT AT ALL
2. NOT BEING ABLE TO STOP OR CONTROL WORRYING: NOT AT ALL
1. FEELING NERVOUS, ANXIOUS, OR ON EDGE: NOT AT ALL
GAD7 TOTAL SCORE: 0
5. BEING SO RESTLESS THAT IT IS HARD TO SIT STILL: NOT AT ALL
6. BECOMING EASILY ANNOYED OR IRRITABLE: NOT AT ALL
3. WORRYING TOO MUCH ABOUT DIFFERENT THINGS: NOT AT ALL

## 2019-04-24 ASSESSMENT — PATIENT HEALTH QUESTIONNAIRE - PHQ9: 5. POOR APPETITE OR OVEREATING: NOT AT ALL

## 2019-04-24 ASSESSMENT — MIFFLIN-ST. JEOR: SCORE: 1447.52

## 2019-04-24 NOTE — LETTER
"  RE: Brie Brown  [address] [facility] [address]     Dear Colleague,    Thank you for referring your patient, Brie Brown, to the [facility] at [facility]. Please see a copy of my visit note below.    Nursing Notes:   Elana GarciaMELITON  2019  2:18 PM  Signed  SUBJECTIVE:   Brie Brown is here for her 6-week postpartum checkup.     PHQ-9 score: 0  Hx of Abuse:  No    Delivery Date: 3/16/2019.    Delivering provider:  Maria A Larson CNM.    Type of delivery:  .  Perineum:  tear, with repair.     Delivery complications: None  Infant gender:  girl, weight 8 pounds 10.3oz   Feeding Method:  .  Complications reported with feeding:  none, infant thriving .    Bleeding:  None.  Duration:  5 weeks.  Menses resumed:  No  Bowel/Urinary problems:  No    Contraception Planned:  None -- planning vasectomy is she planning pregnancy? no  She  has not had intercourse since delivery..         ================================================================  ROS: 10 point ROS neg other than the symptoms noted above in the HPI.       EXAM:  /72 (BP Location: Left arm, Patient Position: Chair)   Pulse 67   Ht 1.727 m (5' 8\")   Wt 68.4 kg (150 lb 12.8 oz)   LMP 06/15/2018   BMI 22.93 kg/m       General: healthy, alert and no distress  Psych: NEGATIVE  Last PHQ-9 score on record= No Value exists for the : HP#PHQ9  Breasts:  Lactating, Nipples intact with no lesions, Non-tender and No S/S of yeast or mastitis  Abdomen: Benign, Soft, flat, non-tender, No masses, organomegaly and Diastasis less than 1-2 FB  Incision:  None   Vulva:  Normal genitalia and Bartholin's, Urethra, [place]'s normal  Vagina:  normal with good muscle tone  Cervix:  Multiparous, pink, moist, closed, without lesion or CMT and nabothian cyst noted at 3 o'clock position.  Pap smear obtained  Uterus:  fully involuted and non-tender    Adnexa:  Within normal " limits and No masses, nodularity, tenderness  Recto-vaginal:   anus normal    ASSESSMENT:      Normal postpartum exam after   Pregnancy was complicated by:  none.      PLAN:  Pap smear with HPV co-testing obtained   Considering contraception options,  plans vasectomy.  Discussed calcium intake, vitamins and supplements including Vitamin D  Exercise encouraged  Follow up in 1 year    Again, thank you for allowing me to participate in the care of your patient.      Sincerely,    SANCHEZ Oh CNM

## 2019-04-24 NOTE — PROGRESS NOTES
"Nursing Notes:   Elana Garcia MELITON  2019  2:18 PM  Signed  SUBJECTIVE:   Brie Brown is here for her 6-week postpartum checkup.     PHQ-9 score: 0  Hx of Abuse:  No    Delivery Date: 3/16/2019.    Delivering provider:  Maria A Larson CNM.    Type of delivery:  .  Perineum:  tear, with repair.     Delivery complications: None  Infant gender:  girl, weight 8 pounds 10.3oz   Feeding Method:  .  Complications reported with feeding:  none, infant thriving .    Bleeding:  None.  Duration:  5 weeks.  Menses resumed:  No  Bowel/Urinary problems:  No    Contraception Planned:  None -- planning vasectomy is she planning pregnancy? no  She  has not had intercourse since delivery..         ================================================================  ROS: 10 point ROS neg other than the symptoms noted above in the HPI.       EXAM:  /72 (BP Location: Left arm, Patient Position: Chair)   Pulse 67   Ht 1.727 m (5' 8\")   Wt 68.4 kg (150 lb 12.8 oz)   LMP 06/15/2018   BMI 22.93 kg/m      General: healthy, alert and no distress  Psych: NEGATIVE  Last PHQ-9 score on record= No Value exists for the : HP#PHQ9  Breasts:  Lactating, Nipples intact with no lesions, Non-tender and No S/S of yeast or mastitis  Abdomen: Benign, Soft, flat, non-tender, No masses, organomegaly and Diastasis less than 1-2 FB  Incision:  None   Vulva:  Normal genitalia and Bartholin's, Urethra, [place]'s normal  Vagina:  normal with good muscle tone  Cervix:  Multiparous, pink, moist, closed, without lesion or CMT and nabothian cyst noted at 3 o'clock position.  Pap smear obtained  Uterus:  fully involuted and non-tender    Adnexa:  Within normal limits and No masses, nodularity, tenderness  Recto-vaginal:   anus normal            ASSESSMENT:      Normal postpartum exam after   Pregnancy was complicated by:  none.      PLAN:  Pap smear with HPV co-testing obtained   Considering contraception options,  plans " vasectomy.  Discussed calcium intake, vitamins and supplements including Vitamin D  Exercise encouraged  Follow up in 1 year  SANCHEZ OhM

## 2019-04-24 NOTE — NURSING NOTE
SUBJECTIVE:   Brie Brown is here for her 6-week postpartum checkup.     PHQ-9 score: 0  Hx of Abuse:  No    Delivery Date: 3/16/2019.    Delivering provider:  Maria A Larson CNM.    Type of delivery:  .  Perineum:  tear, with repair.     Delivery complications: None  Infant gender:  girl, weight 8 pounds 10.3oz   Feeding Method:  .  Complications reported with feeding:  none, infant thriving .    Bleeding:  None.  Duration:  5 weeks.  Menses resumed:  No  Bowel/Urinary problems:  No    Contraception Planned:  None -- is she planning pregnancy? no  She  has not had intercourse since delivery..

## 2019-04-25 ASSESSMENT — ANXIETY QUESTIONNAIRES: GAD7 TOTAL SCORE: 0

## 2019-04-29 LAB
COPATH REPORT: NORMAL
PAP: NORMAL

## 2019-04-30 LAB
FINAL DIAGNOSIS: NORMAL
HPV HR 12 DNA CVX QL NAA+PROBE: NEGATIVE
HPV16 DNA SPEC QL NAA+PROBE: NEGATIVE
HPV18 DNA SPEC QL NAA+PROBE: NEGATIVE
SPECIMEN DESCRIPTION: NORMAL
SPECIMEN SOURCE CVX/VAG CYTO: NORMAL

## 2019-07-26 ENCOUNTER — OFFICE VISIT (OUTPATIENT)
Dept: FAMILY MEDICINE | Facility: CLINIC | Age: 34
End: 2019-07-26
Payer: COMMERCIAL

## 2019-07-26 ENCOUNTER — MYC MEDICAL ADVICE (OUTPATIENT)
Dept: OBGYN | Facility: CLINIC | Age: 34
End: 2019-07-26

## 2019-07-26 VITALS
DIASTOLIC BLOOD PRESSURE: 71 MMHG | TEMPERATURE: 97.9 F | HEIGHT: 68 IN | SYSTOLIC BLOOD PRESSURE: 116 MMHG | HEART RATE: 66 BPM | RESPIRATION RATE: 14 BRPM | WEIGHT: 150.13 LBS | OXYGEN SATURATION: 100 % | BODY MASS INDEX: 22.75 KG/M2

## 2019-07-26 DIAGNOSIS — M79.645 THUMB PAIN, LEFT: Primary | ICD-10-CM

## 2019-07-26 PROCEDURE — 99213 OFFICE O/P EST LOW 20 MIN: CPT | Performed by: PHYSICIAN ASSISTANT

## 2019-07-26 ASSESSMENT — MIFFLIN-ST. JEOR: SCORE: 1439.46

## 2019-07-26 ASSESSMENT — PAIN SCALES - GENERAL: PAINLEVEL: NO PAIN (0)

## 2019-07-26 NOTE — NURSING NOTE
"Chief Complaint   Patient presents with     Musculoskeletal Problem     /71 (BP Location: Right arm, Patient Position: Sitting, Cuff Size: Adult Regular)   Pulse 66   Temp 97.9  F (36.6  C) (Oral)   Resp 14   Ht 1.727 m (5' 8\")   Wt 68.1 kg (150 lb 2 oz)   LMP  (Exact Date)   SpO2 100%   Breastfeeding? Yes   BMI 22.83 kg/m   Estimated body mass index is 22.83 kg/m  as calculated from the following:    Height as of this encounter: 1.727 m (5' 8\").    Weight as of this encounter: 68.1 kg (150 lb 2 oz).  bp completed using cuff size: regular      Health Maintenance addressed:  NONE    N/a  Kristal Cruz CMA on 7/26/2019 at 10:52 AM                "

## 2019-07-26 NOTE — PROGRESS NOTES
"Subjective     Brie Brown is a 34 year old female who presents to clinic today for the following health issues:    HPI   Musculoskeletal problem/pain      Duration: one week    Description  Location: left thumb    Intensity:  moderate    Accompanying signs and symptoms: pain    History  Previous similar problem: no   Previous evaluation:  none    Precipitating or alleviating factors:  Trauma or overuse: unsure  Aggravating factors include: with certain movments    Therapies tried and outcome: nothing        BP Readings from Last 3 Encounters:   07/26/19 116/71   04/24/19 105/72   03/17/19 111/70    Wt Readings from Last 3 Encounters:   07/26/19 68.1 kg (150 lb 2 oz)   04/24/19 68.4 kg (150 lb 12.8 oz)   03/15/19 72.6 kg (160 lb)                      Reviewed and updated as needed this visit by Provider         Review of Systems   ROS COMP: Constitutional, HEENT, cardiovascular, pulmonary, gi and gu systems are negative, except as otherwise noted.      Objective    /71 (BP Location: Right arm, Patient Position: Sitting, Cuff Size: Adult Regular)   Pulse 66   Temp 97.9  F (36.6  C) (Oral)   Resp 14   Ht 1.727 m (5' 8\")   Wt 68.1 kg (150 lb 2 oz)   LMP  (Exact Date)   SpO2 100%   Breastfeeding? Yes   BMI 22.83 kg/m    Body mass index is 22.83 kg/m .  Physical Exam   GENERAL: alert and no distress  EYES: Eyes grossly normal to inspection  RESP: lungs clear to auscultation - no rales, rhonchi or wheezes  CV: regular rate and rhythm, normal S1 S2, no S3 or S4, no murmur, click or rub, no peripheral edema and peripheral pulses strong  MS: limited active ROM in left MCP joint in left with some tenderness.  Not much extensor tendon pain.  No erythema or swelling.     Diagnostic Test Results:  Labs reviewed in Epic        Assessment & Plan     1. Thumb pain, left  Will trial conservative care over the weekend with aggressive icing, topical voltaren, and OTC brace as needed.  If symptoms persist or worsen into " next week, may refer to sports med.  - diclofenac (VOLTAREN) 1 % topical gel; Place 2 g onto the skin 4 times daily  Dispense: 100 g; Refill: 0           Return in about 1 week (around 8/2/2019) for If symptoms persist or worsen.    Harvinder Gillette PA-C  [facility]

## 2019-08-01 ENCOUNTER — OFFICE VISIT (OUTPATIENT)
Dept: OBGYN | Facility: CLINIC | Age: 34
End: 2019-08-01
Attending: ADVANCED PRACTICE MIDWIFE
Payer: COMMERCIAL

## 2019-08-01 VITALS
WEIGHT: 149.5 LBS | HEART RATE: 78 BPM | DIASTOLIC BLOOD PRESSURE: 68 MMHG | HEIGHT: 68 IN | SYSTOLIC BLOOD PRESSURE: 106 MMHG | BODY MASS INDEX: 22.66 KG/M2

## 2019-08-01 PROCEDURE — G0463 HOSPITAL OUTPT CLINIC VISIT: HCPCS | Mod: ZF

## 2019-08-01 ASSESSMENT — PATIENT HEALTH QUESTIONNAIRE - PHQ9
5. POOR APPETITE OR OVEREATING: SEVERAL DAYS
SUM OF ALL RESPONSES TO PHQ QUESTIONS 1-9: 8

## 2019-08-01 ASSESSMENT — ANXIETY QUESTIONNAIRES
6. BECOMING EASILY ANNOYED OR IRRITABLE: NEARLY EVERY DAY
GAD7 TOTAL SCORE: 12
3. WORRYING TOO MUCH ABOUT DIFFERENT THINGS: MORE THAN HALF THE DAYS
2. NOT BEING ABLE TO STOP OR CONTROL WORRYING: MORE THAN HALF THE DAYS
5. BEING SO RESTLESS THAT IT IS HARD TO SIT STILL: NOT AT ALL
7. FEELING AFRAID AS IF SOMETHING AWFUL MIGHT HAPPEN: MORE THAN HALF THE DAYS
1. FEELING NERVOUS, ANXIOUS, OR ON EDGE: MORE THAN HALF THE DAYS

## 2019-08-01 ASSESSMENT — PAIN SCALES - GENERAL: PAINLEVEL: NO PAIN (0)

## 2019-08-01 ASSESSMENT — MIFFLIN-ST. JEOR: SCORE: 1436.63

## 2019-08-01 NOTE — PROGRESS NOTES
"Subjective:  Brie Brown 34 year old year old female, , who presents for concerns about mood and possibility of initiating antidepressant.    Reports since delivery has been feeling more irritable towards partner, mother and other family members on regular basis.  Denies any SI/HI. Tearful during discussion about mood.  Also notes has been worrying more about something bad happening to children (school shooting at , baby stopping breathing at , winter weather road conditions). Tries to minimize time away from kids.  Was urged by sister to make appointment and potentially start medication.    Reports appetite has returned to regular and sleeping normally for having 4 month old.     Objective:   /68   Pulse 78   Ht 1.727 m (5' 8\")   Wt 67.8 kg (149 lb 8 oz)   BMI 22.73 kg/m    She appears well, afebrile.  Tearful.    PHQ-9 score:    PHQ-9 SCORE 2019   PHQ-9 Total Score 8     SOPHIE-7 SCORE 2019   Total Score 12     Assessment:   Postpartum depression and anxiety    Plan:  - Reviewed risks/benefits of initiating medication with the addition of talk therapy. Reviewed importance of taking care of self to also take care of children. Discussed concerns about level of worrying. Reviewed option of starting antidepressant at low dose and adjusting as needed. Reviewed safety during breastfeeding and potential side effects.   - Pt has referral in for [facility] and is awaiting intake call.   - Return to clinic in 1 month for follow-up  "

## 2019-08-01 NOTE — NURSING NOTE
Chief Complaint   Patient presents with     Recheck Medication     Patient here to discuss medications.

## 2019-08-01 NOTE — LETTER
"2019       RE: Brie Brown  [address]  [facility] [address]     Dear Colleague,    Thank you for referring your patient, Brie Brown, to the [facility] at [facility]. Please see a copy of my visit note below.    Subjective:  Brie Brown 34 year old year old female, , who presents for concerns about mood and possibility of initiating antidepressant.    Reports since delivery has been feeling more irritable towards partner, mother and other family members on regular basis.  Denies any SI/HI. Tearful during discussion about mood.  Also notes has been worrying more about something bad happening to children (school shooting at , baby stopping breathing at , winter weather road conditions). Tries to minimize time away from kids.  Was urged by sister to make appointment and potentially start medication.    Reports appetite has returned to regular and sleeping normally for having 4 month old.     Objective:   /68   Pulse 78   Ht 1.727 m (5' 8\")   Wt 67.8 kg (149 lb 8 oz)   BMI 22.73 kg/m     She appears well, afebrile.  Tearful.    PHQ-9 score:    PHQ-9 SCORE 2019   PHQ-9 Total Score 8     SOPHIE-7 SCORE 2019   Total Score 12     Assessment:   Postpartum depression and anxiety    Plan:  - Reviewed risks/benefits of initiating medication with the addition of talk therapy. Reviewed importance of taking care of self to also take care of children. Discussed concerns about level of worrying. Reviewed option of starting antidepressant at low dose and adjusting as needed. Reviewed safety during breastfeeding and potential side effects.   - Pt has referral in for [facility] and is awaiting intake call.   - Return to clinic in 1 month for follow-up    Again, thank you for allowing me to participate in the care of your patient.      Sincerely,    SANCHEZ Rodriguez CNM      "

## 2019-08-02 ENCOUNTER — TELEPHONE (OUTPATIENT)
Dept: OBGYN | Facility: CLINIC | Age: 34
End: 2019-08-02

## 2019-08-02 ASSESSMENT — ANXIETY QUESTIONNAIRES: GAD7 TOTAL SCORE: 12

## 2019-08-02 NOTE — TELEPHONE ENCOUNTER
Placed referral for [facility]. Spoke with patient and informed she should receive a call from intake in the next week or so. Informed of possible wait list for scheduling -- patient expressed understanding. Advised can reach out to triage if needs resources in the mean time. Any thoughts self harm or harm to others should present to ED. Discussed sertraline side effects. Patient had no further questions at this time. Placed [facility] referral

## 2019-08-02 NOTE — TELEPHONE ENCOUNTER
----- Message from Eugenie Lopez RN sent at 7/26/2019 10:32 AM CDT -----  Regarding: [facility]  Gabriela Ross and Linda!     This patient called looking for resources regarding referral/appointment with [facility]. None of us were sure of the process, so I did ask if it was ok for her to be contacted next week with information, and she was okay with this.     Thank you!  PAMELA Berry

## 2019-09-03 ENCOUNTER — OFFICE VISIT (OUTPATIENT)
Dept: OBGYN | Facility: CLINIC | Age: 34
End: 2019-09-03
Attending: ADVANCED PRACTICE MIDWIFE
Payer: COMMERCIAL

## 2019-09-03 VITALS
BODY MASS INDEX: 22.4 KG/M2 | WEIGHT: 147.8 LBS | HEART RATE: 69 BPM | DIASTOLIC BLOOD PRESSURE: 71 MMHG | HEIGHT: 68 IN | SYSTOLIC BLOOD PRESSURE: 108 MMHG

## 2019-09-03 DIAGNOSIS — F41.8 POSTPARTUM ANXIETY: Primary | ICD-10-CM

## 2019-09-03 ASSESSMENT — ANXIETY QUESTIONNAIRES
7. FEELING AFRAID AS IF SOMETHING AWFUL MIGHT HAPPEN: SEVERAL DAYS
6. BECOMING EASILY ANNOYED OR IRRITABLE: SEVERAL DAYS
GAD7 TOTAL SCORE: 4
2. NOT BEING ABLE TO STOP OR CONTROL WORRYING: SEVERAL DAYS
5. BEING SO RESTLESS THAT IT IS HARD TO SIT STILL: NOT AT ALL
1. FEELING NERVOUS, ANXIOUS, OR ON EDGE: SEVERAL DAYS
3. WORRYING TOO MUCH ABOUT DIFFERENT THINGS: NOT AT ALL

## 2019-09-03 ASSESSMENT — MIFFLIN-ST. JEOR: SCORE: 1428.92

## 2019-09-03 ASSESSMENT — PATIENT HEALTH QUESTIONNAIRE - PHQ9
SUM OF ALL RESPONSES TO PHQ QUESTIONS 1-9: 1
5. POOR APPETITE OR OVEREATING: NOT AT ALL

## 2019-09-03 NOTE — PROGRESS NOTES
"Subjective:  Brie Brown 34 year old year old female, , who presents for follow-up of postpartum depression and anxiety.    Was started on 50mg of Zoloft on 19. She reports she is doing \"a million times better.\"  The irritability and anxiety have decreased.  Initially, she had side effects x 10 days; including headache, insomnia, drowsiness and loss of appetite, but they have since resolved.    Her sleep has largely improved, although her baby has been sick and teething, so she is considering starting sleep training.      Objective:   /71   Pulse 69   Ht 1.727 m (5' 8\")   Wt 67 kg (147 lb 12.8 oz)   BMI 22.47 kg/m    She appears well, afebrile.    PHQ-9 score:    PHQ-9 SCORE 2018 2019 9/3/2019   PHQ-9 Total Score 0 8 1     SOPHIE-7 SCORE 2019 2019 9/3/2019   Total Score 0 12 4     Assessment:   Postpartum depression and anxiety     Plan:  - Continue at current dose of Zoloft  - Reviewed benefits of combination therapy of medication and talk therapy and encouraged to keep appt for [facility] clinic. Has appt scheduled for 10/31.  - Return to clinic prn if symptoms persist or worsen.    I, Tahira Cruz, am serving as a scribe; to document services personally performed by  Bobby Pratt based on data collection and the provider's statements to me.     YRN Buitrago    The encounter was performed by me and scribed by the SNM. The scribed note accurately reflects my personal services and decisions made by me. -Bobby Pratt, SANCHEZ CNM  "

## 2019-09-03 NOTE — LETTER
"9/3/2019       RE: Brie Brown  [address]  [facility] [address]     Dear Colleague,    Thank you for referring your patient, Brie Brown, to the [facility] at [facility]. Please see a copy of my visit note below.    Subjective:  Brie Brown 34 year old year old female, , who presents for follow-up of postpartum depression and anxiety.    Was started on 50mg of Zoloft on 19. She reports she is doing \"a million times better.\"  The irritability and anxiety have decreased.  Initially, she had side effects x 10 days; including headache, insomnia, drowsiness and loss of appetite, but they have since resolved.    Her sleep has largely improved, although her baby has been sick and teething, so she is considering starting sleep training.      Objective:   /71   Pulse 69   Ht 1.727 m (5' 8\")   Wt 67 kg (147 lb 12.8 oz)   BMI 22.47 kg/m     She appears well, afebrile.    PHQ-9 score:    PHQ-9 SCORE 2018 2019 9/3/2019   PHQ-9 Total Score 0 8 1     SOPHIE-7 SCORE 2019 2019 9/3/2019   Total Score 0 12 4     Assessment:   Postpartum depression and anxiety     Plan:  - Continue at current dose of Zoloft  - Reviewed benefits of combination therapy of medication and talk therapy and encouraged to keep appt for [facility]. Has appt scheduled for 10/31.  - Return to clinic prn if symptoms persist or worsen.    I, Tahira Cruz, am serving as a scribe; to document services personally performed by  Bobby Pratt based on data collection and the provider's statements to me.     YRN Buitraog    The encounter was performed by me and scribed by the SNM. The scribed note accurately reflects my personal services and decisions made by me. -SANCHEZ Rodriguez CNM    Again, thank you for allowing me to participate in the care of your patient.      Sincerely,    SANCHEZ Rodriguez CNM      "

## 2019-09-03 NOTE — PROGRESS NOTES
Side effects: headache, insomnia, drowsy, loss of appetite, for 10 days,  But they have eased.appt on 10/31 with [facility].

## 2019-09-04 ASSESSMENT — ANXIETY QUESTIONNAIRES: GAD7 TOTAL SCORE: 4

## 2019-11-08 ENCOUNTER — HEALTH MAINTENANCE LETTER (OUTPATIENT)
Age: 34
End: 2019-11-08

## 2020-04-06 ENCOUNTER — MYC REFILL (OUTPATIENT)
Dept: OBGYN | Facility: CLINIC | Age: 35
End: 2020-04-06

## 2020-04-06 DIAGNOSIS — F41.8 POSTPARTUM ANXIETY: ICD-10-CM

## 2020-04-08 ENCOUNTER — MYC REFILL (OUTPATIENT)
Dept: OBGYN | Facility: CLINIC | Age: 35
End: 2020-04-08

## 2020-04-08 DIAGNOSIS — F41.8 POSTPARTUM ANXIETY: ICD-10-CM

## 2020-06-22 ENCOUNTER — APPOINTMENT (OUTPATIENT)
Dept: ULTRASOUND IMAGING | Facility: CLINIC | Age: 35
End: 2020-06-22
Attending: EMERGENCY MEDICINE
Payer: COMMERCIAL

## 2020-06-22 ENCOUNTER — HOSPITAL ENCOUNTER (EMERGENCY)
Facility: CLINIC | Age: 35
Discharge: HOME | End: 2020-06-22
Attending: EMERGENCY MEDICINE | Admitting: EMERGENCY MEDICINE
Payer: COMMERCIAL

## 2020-06-22 VITALS
TEMPERATURE: 98.6 F | SYSTOLIC BLOOD PRESSURE: 124 MMHG | RESPIRATION RATE: 20 BRPM | HEART RATE: 74 BPM | DIASTOLIC BLOOD PRESSURE: 67 MMHG | OXYGEN SATURATION: 99 %

## 2020-06-22 DIAGNOSIS — K80.50 BILIARY COLIC: ICD-10-CM

## 2020-06-22 LAB
ALBUMIN SERPL-MCNC: 4.3 G/DL (ref 3.4–5)
ALBUMIN UR-MCNC: 10 MG/DL
ALP SERPL-CCNC: 61 U/L (ref 40–150)
ALT SERPL W P-5'-P-CCNC: 22 U/L (ref 0–50)
AMORPH CRY #/AREA URNS HPF: ABNORMAL /HPF
ANION GAP SERPL CALCULATED.3IONS-SCNC: 7 MMOL/L (ref 3–14)
APPEARANCE UR: ABNORMAL
AST SERPL W P-5'-P-CCNC: 17 U/L (ref 0–45)
B-HCG SERPL-ACNC: <1 IU/L (ref 0–5)
BASOPHILS # BLD AUTO: 0 10E9/L (ref 0–0.2)
BASOPHILS NFR BLD AUTO: 0.1 %
BILIRUB SERPL-MCNC: 0.2 MG/DL (ref 0.2–1.3)
BILIRUB UR QL STRIP: NEGATIVE
BUN SERPL-MCNC: 16 MG/DL (ref 7–30)
CALCIUM SERPL-MCNC: 9.3 MG/DL (ref 8.5–10.1)
CHLORIDE SERPL-SCNC: 104 MMOL/L (ref 94–109)
CO2 SERPL-SCNC: 25 MMOL/L (ref 20–32)
COLOR UR AUTO: YELLOW
CREAT SERPL-MCNC: 0.59 MG/DL (ref 0.52–1.04)
DIFFERENTIAL METHOD BLD: NORMAL
EOSINOPHIL # BLD AUTO: 0.1 10E9/L (ref 0–0.7)
EOSINOPHIL NFR BLD AUTO: 0.6 %
ERYTHROCYTE [DISTWIDTH] IN BLOOD BY AUTOMATED COUNT: 12.4 % (ref 10–15)
GFR SERPL CREATININE-BSD FRML MDRD: >90 ML/MIN/{1.73_M2}
GLUCOSE SERPL-MCNC: 131 MG/DL (ref 70–99)
GLUCOSE UR STRIP-MCNC: NEGATIVE MG/DL
HCT VFR BLD AUTO: 35.6 % (ref 35–47)
HGB BLD-MCNC: 11.9 G/DL (ref 11.7–15.7)
HGB UR QL STRIP: ABNORMAL
IMM GRANULOCYTES # BLD: 0 10E9/L (ref 0–0.4)
IMM GRANULOCYTES NFR BLD: 0.1 %
KETONES UR STRIP-MCNC: 10 MG/DL
LEUKOCYTE ESTERASE UR QL STRIP: NEGATIVE
LIPASE SERPL-CCNC: 88 U/L (ref 73–393)
LYMPHOCYTES # BLD AUTO: 1.6 10E9/L (ref 0.8–5.3)
LYMPHOCYTES NFR BLD AUTO: 19 %
MCH RBC QN AUTO: 30.2 PG (ref 26.5–33)
MCHC RBC AUTO-ENTMCNC: 33.4 G/DL (ref 31.5–36.5)
MCV RBC AUTO: 90 FL (ref 78–100)
MONOCYTES # BLD AUTO: 0.3 10E9/L (ref 0–1.3)
MONOCYTES NFR BLD AUTO: 3.7 %
MUCOUS THREADS #/AREA URNS LPF: PRESENT /LPF
NEUTROPHILS # BLD AUTO: 6.6 10E9/L (ref 1.6–8.3)
NEUTROPHILS NFR BLD AUTO: 76.5 %
NITRATE UR QL: NEGATIVE
NRBC # BLD AUTO: 0 10*3/UL
NRBC BLD AUTO-RTO: 0 /100
PH UR STRIP: 7.5 PH (ref 5–7)
PLATELET # BLD AUTO: 248 10E9/L (ref 150–450)
POTASSIUM SERPL-SCNC: 3.3 MMOL/L (ref 3.4–5.3)
PROT SERPL-MCNC: 7.8 G/DL (ref 6.8–8.8)
RBC # BLD AUTO: 3.94 10E12/L (ref 3.8–5.2)
RBC #/AREA URNS AUTO: 5 /HPF (ref 0–2)
SODIUM SERPL-SCNC: 136 MMOL/L (ref 133–144)
SOURCE: ABNORMAL
SP GR UR STRIP: 1.02 (ref 1–1.03)
UROBILINOGEN UR STRIP-MCNC: NORMAL MG/DL (ref 0–2)
WBC # BLD AUTO: 8.6 10E9/L (ref 4–11)
WBC #/AREA URNS AUTO: 0 /HPF (ref 0–5)

## 2020-06-22 PROCEDURE — 85025 COMPLETE CBC W/AUTO DIFF WBC: CPT | Performed by: EMERGENCY MEDICINE

## 2020-06-22 PROCEDURE — 25000128 H RX IP 250 OP 636: Performed by: EMERGENCY MEDICINE

## 2020-06-22 PROCEDURE — 81001 URINALYSIS AUTO W/SCOPE: CPT | Performed by: EMERGENCY MEDICINE

## 2020-06-22 PROCEDURE — 83690 ASSAY OF LIPASE: CPT | Performed by: EMERGENCY MEDICINE

## 2020-06-22 PROCEDURE — 96376 TX/PRO/DX INJ SAME DRUG ADON: CPT

## 2020-06-22 PROCEDURE — 25800030 ZZH RX IP 258 OP 636: Performed by: EMERGENCY MEDICINE

## 2020-06-22 PROCEDURE — 76705 ECHO EXAM OF ABDOMEN: CPT

## 2020-06-22 PROCEDURE — 80053 COMPREHEN METABOLIC PANEL: CPT | Performed by: EMERGENCY MEDICINE

## 2020-06-22 PROCEDURE — 96375 TX/PRO/DX INJ NEW DRUG ADDON: CPT

## 2020-06-22 PROCEDURE — 96374 THER/PROPH/DIAG INJ IV PUSH: CPT

## 2020-06-22 PROCEDURE — 96361 HYDRATE IV INFUSION ADD-ON: CPT

## 2020-06-22 PROCEDURE — 84702 CHORIONIC GONADOTROPIN TEST: CPT | Performed by: EMERGENCY MEDICINE

## 2020-06-22 PROCEDURE — 99285 EMERGENCY DEPT VISIT HI MDM: CPT | Mod: 25

## 2020-06-22 RX ORDER — MORPHINE SULFATE 4 MG/ML
4 INJECTION, SOLUTION INTRAMUSCULAR; INTRAVENOUS
Status: DISCONTINUED | OUTPATIENT
Start: 2020-06-22 | End: 2020-06-22 | Stop reason: HOSPADM

## 2020-06-22 RX ORDER — ONDANSETRON 2 MG/ML
4 INJECTION INTRAMUSCULAR; INTRAVENOUS ONCE
Status: COMPLETED | OUTPATIENT
Start: 2020-06-22 | End: 2020-06-22

## 2020-06-22 RX ORDER — OXYCODONE HYDROCHLORIDE 5 MG/1
2.5-5 TABLET ORAL EVERY 6 HOURS PRN
Qty: 8 TABLET | Refills: 0 | Status: SHIPPED | OUTPATIENT
Start: 2020-06-22 | End: 2020-07-06

## 2020-06-22 RX ORDER — ONDANSETRON 4 MG/1
4-8 TABLET, ORALLY DISINTEGRATING ORAL EVERY 8 HOURS PRN
Qty: 18 TABLET | Refills: 0 | Status: SHIPPED | OUTPATIENT
Start: 2020-06-22 | End: 2020-07-06

## 2020-06-22 RX ORDER — SODIUM CHLORIDE 9 MG/ML
1000 INJECTION, SOLUTION INTRAVENOUS CONTINUOUS
Status: DISCONTINUED | OUTPATIENT
Start: 2020-06-22 | End: 2020-06-22 | Stop reason: HOSPADM

## 2020-06-22 RX ORDER — ONDANSETRON 2 MG/ML
4 INJECTION INTRAMUSCULAR; INTRAVENOUS EVERY 30 MIN PRN
Status: DISCONTINUED | OUTPATIENT
Start: 2020-06-22 | End: 2020-06-22 | Stop reason: HOSPADM

## 2020-06-22 RX ADMIN — SODIUM CHLORIDE 1000 ML: 9 INJECTION, SOLUTION INTRAVENOUS at 03:30

## 2020-06-22 RX ADMIN — ONDANSETRON 4 MG: 2 INJECTION INTRAMUSCULAR; INTRAVENOUS at 04:09

## 2020-06-22 RX ADMIN — ONDANSETRON 4 MG: 2 INJECTION INTRAMUSCULAR; INTRAVENOUS at 03:30

## 2020-06-22 RX ADMIN — SODIUM CHLORIDE 1000 ML: 9 INJECTION, SOLUTION INTRAVENOUS at 05:34

## 2020-06-22 RX ADMIN — MORPHINE SULFATE 4 MG: 4 INJECTION INTRAVENOUS at 04:09

## 2020-06-22 RX ADMIN — MORPHINE SULFATE 4 MG: 4 INJECTION INTRAVENOUS at 04:34

## 2020-06-22 NOTE — ED AVS SNAPSHOT
Emergency Department  [address] [facility] [address]  Phone:  [phone]  Fax:  [phone]                                    Brie Brown   MRN: [id number]    Department:   Emergency Department   Date of Visit:  6/22/2020           After Visit Summary Signature Page    I have received my discharge instructions, and my questions have been answered. I have discussed any challenges I see with this plan with the nurse or doctor.    ..........................................................................................................................................  Patient/Patient Representative Signature      ..........................................................................................................................................  Patient Representative Print Name and Relationship to Patient    ..................................................               ................................................  Date                                   Time    ..........................................................................................................................................  Reviewed by Signature/Title    ...................................................              ..............................................  Date                                               Time          22EPIC Rev 08/18

## 2020-06-22 NOTE — DISCHARGE INSTRUCTIONS
To as needed for severe pain not relieved by Tylenol or ibuprofen.  Zofran for nausea.*You may resume activities as tolerated.  Avoid spicy or fatty foods.  *Take medications as prescribed.  Ibuprofen and/or Tylenol as directed as needed for pain.  Continue your current medications.  *Follow-up with general surgery within 5-7 days.  *Return if you develop pain lasting greater than 6 hours, are unable to keep fluids down, fever, faint or feel like you will faint or become worse in any way.      Discharge Instructions  Biliary Colic    You have been seen today for biliary colic. Biliary colic is the pain that happens when gallstones block the normal flow of bile from the gallbladder.  It usually is a steady or crampy pain in the upper abdomen, most often under the right side of the rib cage where the gallbladder is. Sometimes you get pain from the gallbladder in your back or shoulder. It is common to have nausea and vomiting with biliary colic.    Bile is a liquid the body makes to help with digesting fat.  It is made by the liver and stored in the gallbladder and released from the gall bladder when you eat fatty foods. Gallstones can form for a variety of reasons. Risk factors for gallstones include being female, having a family history of gallstones, being older, being pregnant or having been pregnant, having diabetes, having rapid weight loss, and others.      Once gallstones form, surgeons usually tell you to have your gallbladder removed. There is medicine that can dissolve gallstones, but it can be unpleasant to take, and gallstones tend to come back when you quit taking the medicine. Your regular physician can help decide on the right treatment for you, and may refer you to a surgeon to discuss whether surgery is right in your case.     Complications of gallstones include infection, jaundice, inflammation of the pancreas, and rupture of the gallbladder. One of these complications will happen to about one out of  every four patients with gallstones over the next 10-20 years if they are not treated.       Return to the Emergency Department if you develop:  Fever greater than 100.5 degrees Fahrenheit, or 38.1 degrees Celsius.  Persistent nausea and vomiting.  Pain that won t go away with the medicines you were given here.  Yellow skin or eye color (jaundice).  Other new concerning symptoms.    What can I do to help myself?  Eat regular meals at least three times a day, to make the gallbladder empty before it gets too full.  Avoid fatty foods.  Drink plenty of clear fluids.  Take over the counter or prescribed pain medications.    See your doctor within 1 week (or as directed by your doctor today) for follow up.            If you were given a prescription for medicine here today, be sure to read all of the information (including the package insert) that comes with your prescription.  This will include important information about the medicine, its side effects, and any warnings that you need to know about.  The pharmacist who fills the prescription can provide more information and answer questions you may have about the medicine.  If you have questions or concerns that the pharmacist cannot address, please call or return to the Emergency Department.   Opioid Medication Information    Pain medications are among the most commonly prescribed medicines, so we are including this information for all our patients. If you did not receive pain medication or get a prescription for pain medicine, you can ignore it.     You may have been given a prescription for an opioid (narcotic) pain medicine and/or have received a pain medicine while here in the Emergency Department. These medicines can make you drowsy or impaired. You must not drive, operate dangerous equipment, or engage in any other dangerous activities while taking these medications. If you drive while taking these medications, you could be arrested for DUI, or driving under the  influence. Do not drink any alcohol while you are taking these medications.     Opioid pain medications can cause addiction. If you have a history of chemical dependency of any type, you are at a higher risk of becoming addicted to pain medications.  Only take these prescribed medications to treat your pain when all other options have been tried. Take it for as short a time and as few doses as possible. Store your pain pills in a secure place, as they are frequently stolen and provide a dangerous opportunity for children or visitors in your house to start abusing these powerful medications. We will not replace any lost or stolen medicine.  As soon as your pain is better, you should flush all your remaining medication.     Many prescription pain medications contain Tylenol  (acetaminophen), including Vicodin , Tylenol #3 , Norco , Lortab , and Percocet .  You should not take any extra pills of Tylenol  if you are using these prescription medications or you can get very sick.  Do not ever take more than 3000 mg of acetaminophen in any 24 hour period.    All opioids tend to cause constipation. Drink plenty of water and eat foods that have a lot of fiber, such as fruits, vegetables, prune juice, apple juice and high fiber cereal.  Take a laxative if you don t move your bowels at least every other day. Miralax , Milk of Magnesia, Colace , or Senna  can be used to keep you regular.      Remember that you can always come back to the Emergency Department if you are not able to see your regular doctor in the amount of time listed above, if you get any new symptoms, or if there is anything that worries you.

## 2020-06-22 NOTE — ED PROVIDER NOTES
History     Chief Complaint:  Nausea, Vomiting, & Diarrhea    HPI   Brie Brown is a 34 year old female who presents with RUQ pain radiating to her back. She has vomited several times.  It started a few hours after she ate dinner tonight. She has had diarrhea for about a month but no change in the diarrhea. No fevers, cough, shortness of breath, rashes or urinary symptoms.  She has had some similar episodes over the past month but nothing as bad as this. She still has her gallbladder. She took some tums but this did not help.    Allergies:  Augmentin  Seasonal Allergies     Medications:    Zoloft    Past Medical History:    Postpartum anxiety   Postpartum depression   Papanicolaou smear of cervix with low grade squamous intraepithelial lesion (LGSIL).    NIL paps  Anemia    Past Surgical History:    ENT surgery    Family History:    Mother: lipids  Father: hypertension, depression, lipids  Paternal grandfather: heart disease  Sister: depression    Social History:  The patient presented alone  Smoking Status: Never Smoker  Smokeless Tobacco: Never Used  Alcohol Use: Positive  Drug Use: Negative  PCP: [facility]  Marital Status:        Review of Systems  As noted per HPI.  Remainder of a 10 point review of systems was negative.      Physical Exam     Patient Vitals for the past 24 hrs:   BP Temp Temp src Pulse Resp SpO2   06/22/20 0440 -- -- -- -- 20 99 %   06/22/20 0317 134/66 98.6  F (37  C) Oral 74 20 100 %     Physical Exam  General: Well-nourished, grimaces, appears to be in pain when I enter the room  Eyes: PERRL, conjunctivae pink no scleral icterus or conjunctival injection  ENT:  Moist mucus membranes, posterior oropharynx clear without erythema or exudates  Respiratory:  Lungs clear to auscultation bilaterally, no crackles/rubs/wheezes.  Good air movement  CV: Normal rate and rhythm, no murmurs/rubs/gallops  GI:  Abdomen soft and non-distended.  Normoactive BS.  +Moderate RUQ  tenderness, no guarding or rebound  Skin: Warm, dry.  No rashes or petechiae  Musculoskeletal: No peripheral edema or calf tenderness  Neuro: Alert and oriented to person/place/time  Psychiatric: Anxious affect    Emergency Department Course     Imaging:  Radiology findings were communicated with the patient who voiced understanding of the findings.    Abdomen US, limited (RUQ only)  1.  Cholelithiasis. There is no evidence of acute cholecystitis.  2.  Mildly dilated common bile duct without cause of obstruction seen.      Reading per radiology     Laboratory:  Laboratory findings were communicated with the patient who voiced understanding of the findings.    CBC: WBC 8.6, HGB 11.9,   CMP: potassium 3.3 (L), glucose 131 (H) o/w WNL (Creatinine 0.59)    Lipase: 88  HCG quantitative pregnancy (blood): <1    UA with microscopic: pending    Interventions:  0330 NS 1000 mL IV  0330 Zofran 4 mg IV  0409 morphine 4 mg IV  0409 Zofran 4 mg IV  0434 morphine 4 mg IV  0534 NS 1000 mL IV    Emergency Department Course:  Nursing notes and vitals reviewed. I performed an exam of the patient as documented above.   0333 IV was inserted and blood was drawn for laboratory testing, results above.  0446 The patient was sent for a abdomen US while in the emergency department, results above.   0534 The patient provided a urine sample here in the emergency department. This was sent for laboratory testing, findings above.  Prior to discharge, I personally reviewed the results with the patient and all related questions were answered. The patient verbalized understanding and is amenable to plan.     Findings and plan explained to the patient. Patient discharged home with instructions regarding supportive care, medications, and reasons to return. The importance of close follow-up was reviewed. The patient was prescribed Zofran and oxycodone.     Impression & Plan      Medical Decision Making:  Brie Brown is a 34 year old female who  presents to the emergency department today for evaluation of nausea, vomiting, and diarrhea.  This patient has symptoms consistent with gallstones and biliary colic.  Ultrasound has confirmed the presence of gallstones.  There is no clinical, laboratory, or ultrasound evidence of Choledocholithiasis, Gallstone Pancreatitis, or Ascending Cholangitis.  The patient will be prescribed opioid analgesics and may take ibuprofen for pain. The patient was educated to avoid fatty foods.  The patient was told to return to ED for increasing pain, vomiting, chills, sweats, or fever.  Follow up with general surgery is indicated for outpatient consultation, this may be arranged as soon as able.    Diagnosis:    ICD-10-CM    1. Biliary colic  K80.50 UA with Microscopic     Disposition:   The patient is discharged to home.    Discharge Medications:  New Prescriptions    ONDANSETRON (ZOFRAN ODT) 4 MG ODT TAB    Take 1-2 tablets (4-8 mg) by mouth every 8 hours as needed for nausea or vomiting    OXYCODONE (ROXICODONE) 5 MG TABLET    Take 0.5-1 tablets (2.5-5 mg) by mouth every 6 hours as needed for pain     Scribe Disclosure:  IMonet am serving as a scribe at 5:16 AM on 6/22/2020 to document services personally performed by Lin Thompson MD based on my observations and the provider's statements to me.    Scribe Disclosure:  Carlie DIETZ am serving as a scribe at 4:33 AM on 6/22/2020 to document services personally performed by Lin Thompson MD based on my observations and the provider's statements to me.     EMERGENCY DEPARTMENT       Lin Thompson MD  06/23/20 [id number]

## 2020-07-06 ENCOUNTER — VIRTUAL VISIT (OUTPATIENT)
Dept: SURGERY | Facility: CLINIC | Age: 35
End: 2020-07-06
Payer: COMMERCIAL

## 2020-07-06 ENCOUNTER — PREP FOR PROCEDURE (OUTPATIENT)
Dept: SURGERY | Facility: CLINIC | Age: 35
End: 2020-07-06

## 2020-07-06 VITALS — WEIGHT: 150 LBS | BODY MASS INDEX: 22.73 KG/M2 | HEIGHT: 68 IN

## 2020-07-06 DIAGNOSIS — K80.20 CALCULUS OF GALLBLADDER WITHOUT CHOLECYSTITIS WITHOUT OBSTRUCTION: Primary | ICD-10-CM

## 2020-07-06 DIAGNOSIS — K80.20 CHOLELITHIASIS: Primary | ICD-10-CM

## 2020-07-06 PROCEDURE — 99204 OFFICE O/P NEW MOD 45 MIN: CPT | Mod: 95 | Performed by: SURGERY

## 2020-07-06 ASSESSMENT — MIFFLIN-ST. JEOR: SCORE: 1433.9

## 2020-07-06 NOTE — PROGRESS NOTES
"Brie Brown is a 35 year old female who is being evaluated via a billable video visit.  This patient was seen on June 22 to the emergency department by Dr. Lin Thompson who is the referring physician.  She presented to the emergency department with right upper quadrant abdominal pain that came on after a meal.  This had some radiation into her back.  She had nausea and vomiting.  She had no fevers or chills.  She had had some diarrhea.  She underwent evaluation in the emergency department.  She had normal labs, specifically normal liver function tests.  She had an abdominal ultrasound which revealed a somewhat distended gallbladder with a solitary stone and sludge.  Common bile duct was measured at 7 mm.  Since the time of that visit she has had no further symptoms.  She does report 2 prior episodes similar but less in severity to what was described for her visit on June 22.    Over the video visit today I reviewed her chart including her labs and imaging.  We then had a lengthy discussion regarding the pathogenesis as well as management options associated with biliary colic and symptomatic gallstones.  I described her the procedure of cholecystectomy.  I reviewed with her the risks, benefits and outcomes.  Her questions were answered.  She is interested in proceeding.  The surgery will therefore be scheduled at a date of her earliest convenience.  Total time spent on the visit was 30 minutes with greater than 50% in face-to-face video conferencing.    The patient has been notified of following:     \"This video visit will be conducted via a call between you and your physician/provider. We have found that certain health care needs can be provided without the need for an in-person physical exam.  This service lets us provide the care you need with a video conversation.  If a prescription is necessary we can send it directly to your pharmacy.  If lab work is needed we can place an order for that and you can then stop by " "our lab to have the test done at a later time.    Video visits are billed at different rates depending on your insurance coverage.  Please reach out to your insurance provider with any questions.    If during the course of the call the physician/provider feels a video visit is not appropriate, you will not be charged for this service.\"    Patient has given verbal consent for Video visit? Yes  How would you like to obtain your AVS? [facility] [place]  Patient would like the video invitation sent by: Text to cell phone: [phone]  Will anyone else be joining your video visit? No        Video-Visit Details    Type of service:  Video Visit    Video Start Time: 1416  Video End Time: 1434    Originating Location (pt. Location): Home    Distant Location (provider location):  [facility]     Platform used for Video Visit: Adolfo Luz MD    Please route or send letter to:  Primary Care Provider (PCP) and Referring Provider        "

## 2020-07-07 DIAGNOSIS — Z11.59 ENCOUNTER FOR SCREENING FOR OTHER VIRAL DISEASES: Primary | ICD-10-CM

## 2020-07-07 PROBLEM — K80.20 CHOLELITHIASIS: Status: ACTIVE | Noted: 2020-07-07

## 2020-07-08 ENCOUNTER — TELEPHONE (OUTPATIENT)
Dept: SURGERY | Facility: CLINIC | Age: 35
End: 2020-07-08

## 2020-07-08 NOTE — TELEPHONE ENCOUNTER
Type of surgery: Lap jerry  Location of surgery: [facility]  Date and time of surgery: 7/24/20 at 10am  Surgeon: Dr. Brandyn Luz  Pre-Op Appt Date: Patient to schedule  Post-Op Appt Date: Patient to schedule   Packet sent out: Yes  Pre-cert/Authorization completed:  Not Applicable  Date: 7/8/20

## 2020-07-22 DIAGNOSIS — Z11.59 ENCOUNTER FOR SCREENING FOR OTHER VIRAL DISEASES: ICD-10-CM

## 2020-07-22 PROCEDURE — U0003 INFECTIOUS AGENT DETECTION BY NUCLEIC ACID (DNA OR RNA); SEVERE ACUTE RESPIRATORY SYNDROME CORONAVIRUS 2 (SARS-COV-2) (CORONAVIRUS DISEASE [COVID-19]), AMPLIFIED PROBE TECHNIQUE, MAKING USE OF HIGH THROUGHPUT TECHNOLOGIES AS DESCRIBED BY CMS-2020-01-R: HCPCS | Performed by: SURGERY

## 2020-07-23 ENCOUNTER — OFFICE VISIT (OUTPATIENT)
Dept: FAMILY MEDICINE | Facility: CLINIC | Age: 35
End: 2020-07-23
Payer: COMMERCIAL

## 2020-07-23 VITALS
HEIGHT: 68 IN | BODY MASS INDEX: 22.43 KG/M2 | HEART RATE: 81 BPM | OXYGEN SATURATION: 100 % | WEIGHT: 148 LBS | DIASTOLIC BLOOD PRESSURE: 66 MMHG | TEMPERATURE: 98.2 F | SYSTOLIC BLOOD PRESSURE: 100 MMHG

## 2020-07-23 DIAGNOSIS — K80.20 CALCULUS OF GALLBLADDER WITHOUT CHOLECYSTITIS WITHOUT OBSTRUCTION: ICD-10-CM

## 2020-07-23 DIAGNOSIS — Z01.818 PREOP GENERAL PHYSICAL EXAM: Primary | ICD-10-CM

## 2020-07-23 LAB
ALBUMIN SERPL-MCNC: 4.1 G/DL (ref 3.4–5)
ALP SERPL-CCNC: 67 U/L (ref 40–150)
ALT SERPL W P-5'-P-CCNC: 21 U/L (ref 0–50)
ANION GAP SERPL CALCULATED.3IONS-SCNC: 5 MMOL/L (ref 3–14)
AST SERPL W P-5'-P-CCNC: 15 U/L (ref 0–45)
BILIRUB SERPL-MCNC: 0.6 MG/DL (ref 0.2–1.3)
BUN SERPL-MCNC: 16 MG/DL (ref 7–30)
CALCIUM SERPL-MCNC: 9.1 MG/DL (ref 8.5–10.1)
CHLORIDE SERPL-SCNC: 106 MMOL/L (ref 94–109)
CO2 SERPL-SCNC: 25 MMOL/L (ref 20–32)
CREAT SERPL-MCNC: 0.72 MG/DL (ref 0.52–1.04)
ERYTHROCYTE [DISTWIDTH] IN BLOOD BY AUTOMATED COUNT: 12.7 % (ref 10–15)
GFR SERPL CREATININE-BSD FRML MDRD: >90 ML/MIN/{1.73_M2}
GLUCOSE SERPL-MCNC: 93 MG/DL (ref 70–99)
HCT VFR BLD AUTO: 37.9 % (ref 35–47)
HGB BLD-MCNC: 12.6 G/DL (ref 11.7–15.7)
MCH RBC QN AUTO: 30.6 PG (ref 26.5–33)
MCHC RBC AUTO-ENTMCNC: 33.2 G/DL (ref 31.5–36.5)
MCV RBC AUTO: 92 FL (ref 78–100)
PLATELET # BLD AUTO: 217 10E9/L (ref 150–450)
POTASSIUM SERPL-SCNC: 4.1 MMOL/L (ref 3.4–5.3)
PROT SERPL-MCNC: 7.6 G/DL (ref 6.8–8.8)
RBC # BLD AUTO: 4.12 10E12/L (ref 3.8–5.2)
SARS-COV-2 RNA SPEC QL NAA+PROBE: NOT DETECTED
SODIUM SERPL-SCNC: 136 MMOL/L (ref 133–144)
SPECIMEN SOURCE: NORMAL
WBC # BLD AUTO: 8.7 10E9/L (ref 4–11)

## 2020-07-23 PROCEDURE — 80053 COMPREHEN METABOLIC PANEL: CPT | Performed by: FAMILY MEDICINE

## 2020-07-23 PROCEDURE — 85027 COMPLETE CBC AUTOMATED: CPT | Performed by: FAMILY MEDICINE

## 2020-07-23 PROCEDURE — 36415 COLL VENOUS BLD VENIPUNCTURE: CPT | Performed by: FAMILY MEDICINE

## 2020-07-23 PROCEDURE — 99213 OFFICE O/P EST LOW 20 MIN: CPT | Performed by: FAMILY MEDICINE

## 2020-07-23 RX ORDER — PNV NO.118/IRON FUMARATE/FA 29 MG-1 MG
TABLET,CHEWABLE ORAL
COMMUNITY
End: 2022-06-28

## 2020-07-23 ASSESSMENT — MIFFLIN-ST. JEOR: SCORE: 1424.82

## 2020-07-23 NOTE — PROGRESS NOTES
[facility]  [address]  [facility] [address]  [phone]  Dept: [phone]    PRE-OP EVALUATION:  Today's date: 2020    Brie Brown (: 1985) presents for pre-operative evaluation assessment as requested by Dr. Luz.  She requires evaluation and anesthesia risk assessment prior to undergoing surgery/procedure for treatment of LAPAROSCOPIC CHOLECYSTECTOMY  .    Proposed Surgery/ Procedure: Cholelithiasis  Date of Surgery/ Procedure: 2020  Time of Surgery/ Procedure: 10:00am  [facility]  Surgery Fax Number: Note does not need to be faxed, will be available electronically in Epic.  Primary Physician: [facility]  Type of Anesthesia Anticipated: General    Preoperative Questionnaire:   No - Have you ever had a heart attack or stroke?  No - Have you ever had surgery on your heart or blood vessels, such as a stent, coronary (heart) bypass, or surgery on an artery in the head, neck, heart, or legs?  No - Do you have chest pain when you are physically active?  No - Do you have a history of heart failure?  YES - DO YOU CURRENTLY HAVE A COLD, BRONCHITIS, OR SYMPTOMS OR OTHER RESPIRATORY (HEAD AND CHEST) INFECTIONS? covid test done 2020 negative  No - Do you or anyone in your family have a history of blood clots?  YES - DO YOU OR ANYONE IN YOUR FAMILY HAVE A SERIOUS BLEEDING PROBLEM, SUCH AS LONG-LASTING BLEEDING AFTER SURGERIES OR CUTS? Sister has bleeding disorder  YES - HAVE YOU EVERY HAD ANEMIA OR BEEN TOLD TO TAKE IRON PILLS? While pregnant   No - Have you had any abnormal blood loss such as black, tarry or bloody stools, or abnormal vaginal bleeding?  No - Have you ever had a blood transfusion?  Yes - Are you willing to have a blood transfusion if it is medically needed before, during, or after your surgery?  No - Have you or anyone in your family ever had problems with anesthesia (sedation for surgery)?  No -  Do you have sleep apnea, excessive snoring, or daytime drowsiness?   No - Do you have any artifical heart valves or other implanted medical devices, such as a pacemaker, defibrillator, or continuous glucose monitor?  No - Do you have any artifical joints?  No - Are you allergic to latex?  No - Is there any chance that you may be pregnant?    Patient has a Health Care Directive or Living Will:  NO    HPI:     HPI related to upcoming procedure:       35-year-old who presents to the clinic for preoperative history and physical exam prior to laparoscopic cholecystectomy.  She has been having biliary colic and right upper quadrant pain since June 22, 2020.  She continues to have episodic right upper quadrant pain.  The patient is scheduled for a cholecystectomy tomorrow.    MEDICAL HISTORY:     Patient Active Problem List    Diagnosis Date Noted     Cholelithiasis 07/07/2020     Priority: Medium     Added automatically from request for surgery [id number]       Postpartum anxiety 09/03/2019     Priority: Medium     Postpartum depression 09/03/2019     Priority: Medium     Papanicolaou smear of cervix with low grade squamous intraepithelial lesion (LGSIL) 04/13/2011     Priority: Medium     In 2009 at [place].  One abnormal then normal colpo.    NIL paps: 3/10, 4/11, 6/12  2/15/16: pap NILM, repeat pap postpartum [ ]        Past Medical History:   Diagnosis Date     Abnormal Pap smear 2009    normals since     Anemia 2016    during last pregnancy     Anxiety      LSIL (low grade squamous intraepithelial lesion) on Pap smear 02/02/09     NO ACTIVE PROBLEMS      Past Surgical History:   Procedure Laterality Date     ENT SURGERY      Tonsils and adenoids removed when very young     Current Outpatient Medications   Medication Sig Dispense Refill     Prenatal Vit-Fe Fumarate-FA (PRENATAL 19) CHEW        sertraline (ZOLOFT) 50 MG tablet Take 1 tablet (50 mg) by mouth daily 90 tablet 3     OTC products: None, except as noted  "above    Allergies   Allergen Reactions     Augmentin      High fever as a child     Seasonal Allergies       Latex Allergy: NO    Social History     Tobacco Use     Smoking status: Never Smoker     Smokeless tobacco: Never Used   Substance Use Topics     Alcohol use: Yes     Alcohol/week: 0.0 standard drinks     Comment: socially     History   Drug Use No       REVIEW OF SYSTEMS:   Constitutional, neuro, ENT, endocrine, pulmonary, cardiac, gastrointestinal, genitourinary, musculoskeletal, integument and psychiatric systems are negative, except as otherwise noted.    EXAM:   /66   Pulse 81   Temp 98.2  F (36.8  C) (Tympanic)   Ht 1.727 m (5' 8\")   Wt 67.1 kg (148 lb)   LMP 07/02/2020   SpO2 100%   BMI 22.50 kg/m      GENERAL APPEARANCE: healthy, alert and no distress     EYES: EOMI, PERRL     HENT: ear canals and TM's normal and nose and mouth without ulcers or lesions     NECK: no adenopathy, no asymmetry, masses, or scars and thyroid normal to palpation     RESP: lungs clear to auscultation - no rales, rhonchi or wheezes     CV: regular rates and rhythm, normal S1 S2, no S3 or S4 and no murmur, click or rub     ABDOMEN:  soft, nontender, no HSM or masses and bowel sounds normal     MS: extremities normal- no gross deformities noted, no evidence of inflammation in joints, FROM in all extremities.     SKIN: no suspicious lesions or rashes     NEURO: Normal strength and tone, sensory exam grossly normal, mentation intact and speech normal     PSYCH: mentation appears normal. and affect normal/bright     LYMPHATICS: No cervical adenopathy    DIAGNOSTICS:     Labs Resulted Today:   Results for orders placed or performed in visit on 07/23/20   CBC with platelets     Status: None   Result Value Ref Range    WBC 8.7 4.0 - 11.0 10e9/L    RBC Count 4.12 3.8 - 5.2 10e12/L    Hemoglobin 12.6 11.7 - 15.7 g/dL    Hematocrit 37.9 35.0 - 47.0 %    MCV 92 78 - 100 fl    MCH 30.6 26.5 - 33.0 pg    MCHC 33.2 31.5 - 36.5 " g/dL    RDW 12.7 10.0 - 15.0 %    Platelet Count 217 150 - 450 10e9/L       Recent Labs   Lab Test 06/22/20  0333 03/17/19  0652 03/15/19  2230   HGB 11.9 10.5* 10.5*     --  170     --   --    POTASSIUM 3.3*  --   --    CR 0.59  --   --         IMPRESSION:   Reason for surgery/procedure: Preoperative history and physical exam prior to surgery.  Diagnosis/reason for consult: Cholelithiasis.    The proposed surgical procedure is considered LOW risk.    REVISED CARDIAC RISK INDEX  The patient has the following serious cardiovascular risks for perioperative complications such as (MI, PE, VFib and 3  AV Block):  No serious cardiac risks  INTERPRETATION: 0 risks: Class I (very low risk - 0.4% complication rate)    The patient has the following additional risks for perioperative complications:  No identified additional risks      ICD-10-CM    1. Preop general physical exam  Z01.818 CBC with platelets     **Comprehensive metabolic panel FUTURE anytime   2. Calculus of gallbladder without cholecystitis without obstruction  K80.20        RECOMMENDATIONS:     --Patient is to take all scheduled medications on the day of surgery    APPROVAL GIVEN to proceed with proposed procedure, without further diagnostic evaluation       Signed Electronically by: Mayuri Monsivais MD    Copy of this evaluation report is provided to requesting physician.    [facility] Preop Guidelines    Revised Cardiac Risk Index

## 2020-07-24 ENCOUNTER — APPOINTMENT (OUTPATIENT)
Dept: SURGERY | Facility: PHYSICIAN GROUP | Age: 35
End: 2020-07-24
Payer: COMMERCIAL

## 2020-07-24 ENCOUNTER — SURGERY (OUTPATIENT)
Age: 35
End: 2020-07-24
Payer: COMMERCIAL

## 2020-07-24 ENCOUNTER — HOSPITAL ENCOUNTER (OUTPATIENT)
Facility: CLINIC | Age: 35
Discharge: HOME | End: 2020-07-24
Attending: SURGERY | Admitting: SURGERY
Payer: COMMERCIAL

## 2020-07-24 ENCOUNTER — ANESTHESIA EVENT (OUTPATIENT)
Dept: SURGERY | Facility: CLINIC | Age: 35
End: 2020-07-24
Payer: COMMERCIAL

## 2020-07-24 ENCOUNTER — ANESTHESIA (OUTPATIENT)
Dept: SURGERY | Facility: CLINIC | Age: 35
End: 2020-07-24
Payer: COMMERCIAL

## 2020-07-24 VITALS
RESPIRATION RATE: 14 BRPM | BODY MASS INDEX: 22.7 KG/M2 | HEART RATE: 67 BPM | TEMPERATURE: 98.2 F | WEIGHT: 149.8 LBS | HEIGHT: 68 IN | SYSTOLIC BLOOD PRESSURE: 116 MMHG | OXYGEN SATURATION: 98 % | DIASTOLIC BLOOD PRESSURE: 73 MMHG

## 2020-07-24 DIAGNOSIS — K80.20 CHOLELITHIASIS: ICD-10-CM

## 2020-07-24 DIAGNOSIS — G89.18 POSTOPERATIVE PAIN: Primary | ICD-10-CM

## 2020-07-24 LAB — HCG UR QL: NEGATIVE

## 2020-07-24 PROCEDURE — 81025 URINE PREGNANCY TEST: CPT | Performed by: ANESTHESIOLOGY

## 2020-07-24 PROCEDURE — 71000012 ZZH RECOVERY PHASE 1 LEVEL 1 FIRST HR: Performed by: SURGERY

## 2020-07-24 PROCEDURE — 36000058 ZZH SURGERY LEVEL 3 EA 15 ADDTL MIN: Performed by: SURGERY

## 2020-07-24 PROCEDURE — 25000128 H RX IP 250 OP 636: Performed by: ANESTHESIOLOGY

## 2020-07-24 PROCEDURE — 71000027 ZZH RECOVERY PHASE 2 EACH 15 MINS: Performed by: SURGERY

## 2020-07-24 PROCEDURE — 37000008 ZZH ANESTHESIA TECHNICAL FEE, 1ST 30 MIN: Performed by: SURGERY

## 2020-07-24 PROCEDURE — 25000132 ZZH RX MED GY IP 250 OP 250 PS 637: Performed by: PHYSICIAN ASSISTANT

## 2020-07-24 PROCEDURE — 25000125 ZZHC RX 250: Performed by: SURGERY

## 2020-07-24 PROCEDURE — 25800030 ZZH RX IP 258 OP 636: Performed by: NURSE ANESTHETIST, CERTIFIED REGISTERED

## 2020-07-24 PROCEDURE — 47562 LAPAROSCOPIC CHOLECYSTECTOMY: CPT | Mod: AS | Performed by: PHYSICIAN ASSISTANT

## 2020-07-24 PROCEDURE — 37000009 ZZH ANESTHESIA TECHNICAL FEE, EACH ADDTL 15 MIN: Performed by: SURGERY

## 2020-07-24 PROCEDURE — 88304 TISSUE EXAM BY PATHOLOGIST: CPT | Mod: 26 | Performed by: SURGERY

## 2020-07-24 PROCEDURE — 25000128 H RX IP 250 OP 636: Performed by: NURSE ANESTHETIST, CERTIFIED REGISTERED

## 2020-07-24 PROCEDURE — 25000128 H RX IP 250 OP 636: Performed by: PHYSICIAN ASSISTANT

## 2020-07-24 PROCEDURE — 27210794 ZZH OR GENERAL SUPPLY STERILE: Performed by: SURGERY

## 2020-07-24 PROCEDURE — 47562 LAPAROSCOPIC CHOLECYSTECTOMY: CPT | Performed by: SURGERY

## 2020-07-24 PROCEDURE — 40000170 ZZH STATISTIC PRE-PROCEDURE ASSESSMENT II: Performed by: SURGERY

## 2020-07-24 PROCEDURE — 25800030 ZZH RX IP 258 OP 636: Performed by: ANESTHESIOLOGY

## 2020-07-24 PROCEDURE — 25800025 ZZH RX 258: Performed by: SURGERY

## 2020-07-24 PROCEDURE — 88304 TISSUE EXAM BY PATHOLOGIST: CPT | Performed by: SURGERY

## 2020-07-24 PROCEDURE — 25000125 ZZHC RX 250: Performed by: NURSE ANESTHETIST, CERTIFIED REGISTERED

## 2020-07-24 PROCEDURE — 36000056 ZZH SURGERY LEVEL 3 1ST 30 MIN: Performed by: SURGERY

## 2020-07-24 PROCEDURE — 25000566 ZZH SEVOFLURANE, EA 15 MIN: Performed by: SURGERY

## 2020-07-24 RX ORDER — CLINDAMYCIN PHOSPHATE 900 MG/50ML
900 INJECTION, SOLUTION INTRAVENOUS SEE ADMIN INSTRUCTIONS
Status: DISCONTINUED | OUTPATIENT
Start: 2020-07-24 | End: 2020-07-24 | Stop reason: HOSPADM

## 2020-07-24 RX ORDER — FENTANYL CITRATE 50 UG/ML
25-50 INJECTION, SOLUTION INTRAMUSCULAR; INTRAVENOUS
Status: DISCONTINUED | OUTPATIENT
Start: 2020-07-24 | End: 2020-07-24 | Stop reason: HOSPADM

## 2020-07-24 RX ORDER — NEOSTIGMINE METHYLSULFATE 1 MG/ML
VIAL (ML) INJECTION PRN
Status: DISCONTINUED | OUTPATIENT
Start: 2020-07-24 | End: 2020-07-24

## 2020-07-24 RX ORDER — HYDROCODONE BITARTRATE AND ACETAMINOPHEN 5; 325 MG/1; MG/1
1-2 TABLET ORAL EVERY 4 HOURS PRN
Qty: 20 TABLET | Refills: 0 | Status: SHIPPED | OUTPATIENT
Start: 2020-07-24 | End: 2021-02-04

## 2020-07-24 RX ORDER — ONDANSETRON 4 MG/1
4 TABLET, ORALLY DISINTEGRATING ORAL EVERY 30 MIN PRN
Status: DISCONTINUED | OUTPATIENT
Start: 2020-07-24 | End: 2020-07-24 | Stop reason: HOSPADM

## 2020-07-24 RX ORDER — NALOXONE HYDROCHLORIDE 0.4 MG/ML
.1-.4 INJECTION, SOLUTION INTRAMUSCULAR; INTRAVENOUS; SUBCUTANEOUS
Status: DISCONTINUED | OUTPATIENT
Start: 2020-07-24 | End: 2020-07-24 | Stop reason: HOSPADM

## 2020-07-24 RX ORDER — HYDROCODONE BITARTRATE AND ACETAMINOPHEN 5; 325 MG/1; MG/1
1-2 TABLET ORAL
Status: COMPLETED | OUTPATIENT
Start: 2020-07-24 | End: 2020-07-24

## 2020-07-24 RX ORDER — ONDANSETRON 2 MG/ML
INJECTION INTRAMUSCULAR; INTRAVENOUS PRN
Status: DISCONTINUED | OUTPATIENT
Start: 2020-07-24 | End: 2020-07-24

## 2020-07-24 RX ORDER — PROPOFOL 10 MG/ML
INJECTION, EMULSION INTRAVENOUS CONTINUOUS PRN
Status: DISCONTINUED | OUTPATIENT
Start: 2020-07-24 | End: 2020-07-24

## 2020-07-24 RX ORDER — CLINDAMYCIN PHOSPHATE 900 MG/50ML
900 INJECTION, SOLUTION INTRAVENOUS
Status: COMPLETED | OUTPATIENT
Start: 2020-07-24 | End: 2020-07-24

## 2020-07-24 RX ORDER — DEXAMETHASONE SODIUM PHOSPHATE 4 MG/ML
INJECTION, SOLUTION INTRA-ARTICULAR; INTRALESIONAL; INTRAMUSCULAR; INTRAVENOUS; SOFT TISSUE PRN
Status: DISCONTINUED | OUTPATIENT
Start: 2020-07-24 | End: 2020-07-24

## 2020-07-24 RX ORDER — SODIUM CHLORIDE, SODIUM LACTATE, POTASSIUM CHLORIDE, CALCIUM CHLORIDE 600; 310; 30; 20 MG/100ML; MG/100ML; MG/100ML; MG/100ML
INJECTION, SOLUTION INTRAVENOUS CONTINUOUS
Status: DISCONTINUED | OUTPATIENT
Start: 2020-07-24 | End: 2020-07-24 | Stop reason: HOSPADM

## 2020-07-24 RX ORDER — LIDOCAINE HYDROCHLORIDE 20 MG/ML
INJECTION, SOLUTION INFILTRATION; PERINEURAL PRN
Status: DISCONTINUED | OUTPATIENT
Start: 2020-07-24 | End: 2020-07-24

## 2020-07-24 RX ORDER — KETOROLAC TROMETHAMINE 30 MG/ML
30 INJECTION, SOLUTION INTRAMUSCULAR; INTRAVENOUS ONCE
Status: COMPLETED | OUTPATIENT
Start: 2020-07-24 | End: 2020-07-24

## 2020-07-24 RX ORDER — HYDROMORPHONE HYDROCHLORIDE 1 MG/ML
.3-.5 INJECTION, SOLUTION INTRAMUSCULAR; INTRAVENOUS; SUBCUTANEOUS EVERY 10 MIN PRN
Status: DISCONTINUED | OUTPATIENT
Start: 2020-07-24 | End: 2020-07-24 | Stop reason: HOSPADM

## 2020-07-24 RX ORDER — ONDANSETRON 2 MG/ML
4 INJECTION INTRAMUSCULAR; INTRAVENOUS EVERY 30 MIN PRN
Status: DISCONTINUED | OUTPATIENT
Start: 2020-07-24 | End: 2020-07-24 | Stop reason: HOSPADM

## 2020-07-24 RX ORDER — BUPIVACAINE HYDROCHLORIDE AND EPINEPHRINE 2.5; 5 MG/ML; UG/ML
INJECTION, SOLUTION INFILTRATION; PERINEURAL PRN
Status: DISCONTINUED | OUTPATIENT
Start: 2020-07-24 | End: 2020-07-24 | Stop reason: HOSPADM

## 2020-07-24 RX ORDER — GLYCOPYRROLATE 0.2 MG/ML
INJECTION, SOLUTION INTRAMUSCULAR; INTRAVENOUS PRN
Status: DISCONTINUED | OUTPATIENT
Start: 2020-07-24 | End: 2020-07-24

## 2020-07-24 RX ORDER — SODIUM CHLORIDE, SODIUM LACTATE, POTASSIUM CHLORIDE, CALCIUM CHLORIDE 600; 310; 30; 20 MG/100ML; MG/100ML; MG/100ML; MG/100ML
INJECTION, SOLUTION INTRAVENOUS CONTINUOUS PRN
Status: DISCONTINUED | OUTPATIENT
Start: 2020-07-24 | End: 2020-07-24

## 2020-07-24 RX ORDER — AMOXICILLIN 250 MG
1-2 CAPSULE ORAL 2 TIMES DAILY PRN
Qty: 20 TABLET | Refills: 0 | Status: SHIPPED | OUTPATIENT
Start: 2020-07-24 | End: 2022-06-28

## 2020-07-24 RX ORDER — FENTANYL CITRATE 50 UG/ML
INJECTION, SOLUTION INTRAMUSCULAR; INTRAVENOUS PRN
Status: DISCONTINUED | OUTPATIENT
Start: 2020-07-24 | End: 2020-07-24

## 2020-07-24 RX ORDER — MAGNESIUM HYDROXIDE 1200 MG/15ML
LIQUID ORAL PRN
Status: DISCONTINUED | OUTPATIENT
Start: 2020-07-24 | End: 2020-07-24 | Stop reason: HOSPADM

## 2020-07-24 RX ORDER — PROPOFOL 10 MG/ML
INJECTION, EMULSION INTRAVENOUS PRN
Status: DISCONTINUED | OUTPATIENT
Start: 2020-07-24 | End: 2020-07-24

## 2020-07-24 RX ADMIN — FENTANYL CITRATE 50 MCG: 50 INJECTION, SOLUTION INTRAMUSCULAR; INTRAVENOUS at 12:28

## 2020-07-24 RX ADMIN — HYDROCODONE BITARTRATE AND ACETAMINOPHEN 1 TABLET: 5; 325 TABLET ORAL at 13:13

## 2020-07-24 RX ADMIN — SODIUM CHLORIDE, POTASSIUM CHLORIDE, SODIUM LACTATE AND CALCIUM CHLORIDE: 600; 310; 30; 20 INJECTION, SOLUTION INTRAVENOUS at 11:39

## 2020-07-24 RX ADMIN — BUPIVACAINE HYDROCHLORIDE AND EPINEPHRINE BITARTRATE 30 ML: 2.5; .005 INJECTION, SOLUTION EPIDURAL; INFILTRATION; INTRACAUDAL; PERINEURAL at 12:05

## 2020-07-24 RX ADMIN — GLYCOPYRROLATE 0.4 MG: 0.2 INJECTION, SOLUTION INTRAMUSCULAR; INTRAVENOUS at 12:16

## 2020-07-24 RX ADMIN — PROPOFOL 50 MCG/KG/MIN: 10 INJECTION, EMULSION INTRAVENOUS at 11:50

## 2020-07-24 RX ADMIN — CLINDAMYCIN PHOSPHATE 900 MG: 900 INJECTION, SOLUTION INTRAVENOUS at 11:50

## 2020-07-24 RX ADMIN — FENTANYL CITRATE 100 MCG: 50 INJECTION, SOLUTION INTRAMUSCULAR; INTRAVENOUS at 11:44

## 2020-07-24 RX ADMIN — SODIUM CHLORIDE 1000 ML: 900 IRRIGANT IRRIGATION at 12:04

## 2020-07-24 RX ADMIN — KETOROLAC TROMETHAMINE 30 MG: 30 INJECTION, SOLUTION INTRAMUSCULAR at 14:33

## 2020-07-24 RX ADMIN — ROCURONIUM BROMIDE 50 MG: 10 INJECTION INTRAVENOUS at 11:44

## 2020-07-24 RX ADMIN — DEXAMETHASONE SODIUM PHOSPHATE 4 MG: 4 INJECTION, SOLUTION INTRA-ARTICULAR; INTRALESIONAL; INTRAMUSCULAR; INTRAVENOUS; SOFT TISSUE at 11:50

## 2020-07-24 RX ADMIN — SODIUM CHLORIDE, POTASSIUM CHLORIDE, SODIUM LACTATE AND CALCIUM CHLORIDE: 600; 310; 30; 20 INJECTION, SOLUTION INTRAVENOUS at 14:36

## 2020-07-24 RX ADMIN — LIDOCAINE HYDROCHLORIDE 100 MG: 20 INJECTION, SOLUTION INFILTRATION; PERINEURAL at 11:44

## 2020-07-24 RX ADMIN — NEOSTIGMINE METHYLSULFATE 3 MG: 1 INJECTION, SOLUTION INTRAVENOUS at 12:16

## 2020-07-24 RX ADMIN — ONDANSETRON 4 MG: 2 INJECTION INTRAMUSCULAR; INTRAVENOUS at 12:11

## 2020-07-24 RX ADMIN — PROPOFOL 200 MG: 10 INJECTION, EMULSION INTRAVENOUS at 11:44

## 2020-07-24 RX ADMIN — ONDANSETRON 4 MG: 2 INJECTION INTRAMUSCULAR; INTRAVENOUS at 13:31

## 2020-07-24 RX ADMIN — SODIUM CHLORIDE 1000 ML: 900 IRRIGANT IRRIGATION at 12:03

## 2020-07-24 RX ADMIN — MIDAZOLAM 2 MG: 1 INJECTION INTRAMUSCULAR; INTRAVENOUS at 11:39

## 2020-07-24 RX ADMIN — WATER 1000 ML: 100 IRRIGANT IRRIGATION at 12:06

## 2020-07-24 ASSESSMENT — MIFFLIN-ST. JEOR: SCORE: 1432.99

## 2020-07-24 NOTE — OP NOTE
General Surgery Operative Note    PREOPERATIVE DIAGNOSIS:  Cholelithiasis [K80.20]    POSTOPERATIVE DIAGNOSIS:  same    PROCEDURE:  LAPAROSCOPIC CHOLECYSTECTOMY    ANESTHESIA:  General.    PREOPERATIVE MEDICATIONS:  Ancef IV.    SURGEON:  Brandyn Luz MD    ASSISTANT:  Socorro Justice PA-C, Physician assistant first assistant was necessary during the performance of this procedure for expertise in patient positioning, prepping, draping, trocar placement, camera management, retraction and exposure, and suctioning.    INDICATIONS: Patient presented with signs and symptoms consistent with cholelithiasis.  Extensive discussion was undertaken regarding the procedure of cholecystectomy.  The potential risks of bleeding, infection, bile duct or visceral injury were thoroughly reviewed.  All of the patient's questions were answered.  The patient wishes to proceed with cholecystectomy.    PROCEDURE:  After informed consent was obtained the patient was taken to the operating suite and uneventfully endotracheally intubated.  The abdomen was prepped and draped in a sterile fashion.  Surgeon initiated timeout was acknowledged.  After infiltration with local anesthestic a curvilinear incision was made in the infraumbilical position and through this a Verees needle was passed intraperitoneally.  Position was confirmed using the saline drop test. A CO2 pneumoperitoneum was then created.  After adequate insufflation the needle was removed and replaced with an 11mm trocar .  Two other trocars were placed under laparoscopic visualization following the infiltration of local anesthetic.  We elevated the liver and were able to identify the gallbladder.  The gallbladder was grasped and used to elevate the liver further.  I began dissecting out some fatty adhesions down near the neck of the gallbladder until a cystic duct was encountered.  I continued the dissection using combination of sharp and blunt dissection until the cystic duct was  largely dissected out.  I continued the dissection up along the sides of the gallbladder, both medially and laterally, until I had created a space between the gallbladder and the liver.  At this point, I encountered the cystic artery, just posterior and lateral to the cystic duct.  This again was dissected out.  Once I had created a window where only the cystic artery and duct were noted to be entering the gallbladder, I felt that this represented our critical view.  The cystic artery and duct were then doubly clipped and divided.  I continued the dissection up along the body of the gallbladder, freeing all attachments and adhesions of the gallbladder to the liver.  Gallbladder was removed from the liver in an atraumatic fashion.  The gallbladder was then placed in a retrieval pouch and removed from the abdomen.  The gallbladder fossa was reinspected, and all areas of bleeding were managed with electrocautery.  I irrigated the area with normal saline and aspirated it out.  I then reinspected the abdomen, and everything appeared to be in pristine condition. The trocars were removed and carbon dioxide was massaged from the abdomen. Local anesthetic was injected. Fascia at the 11mm port site was closed with 0 vicryl suture.  Skin incisions were closed with subcuticular 4-0 Monocryl sutures.  The patient tolerated this procedure without difficulty. Needle and sponge counts were correct. The patient was taken from the operating room To the recovery room in a stable condition.      ESTIMATED BLOOD LOSS:  2cc    Brandyn Luz MD

## 2020-07-24 NOTE — OR NURSING
Patient reported nausea with getting up to recliner. Zofran given. Will reassess at 13:40, transport to Phase II if appropriate at that time.

## 2020-07-24 NOTE — ANESTHESIA CARE TRANSFER NOTE
Patient: Brie Brown    Procedure(s):  LAPAROSCOPIC CHOLECYSTECTOMY    Diagnosis: Cholelithiasis [K80.20]  Diagnosis Additional Information: No value filed.    Anesthesia Type:   General     Note:  Airway :Face Mask  Patient transferred to:PACU  Comments: To PACU: Arouses easily, good airway, 02 face mask, VSS  Report to RNHandoff Report: Identifed the Patient, Identified the Reponsible Provider, Reviewed the pertinent medical history, Discussed the surgical course, Reviewed Intra-OP anesthesia mangement and issues during anesthesia, Set expectations for post-procedure period and Allowed opportunity for questions and acknowledgement of understanding      Vitals: (Last set prior to Anesthesia Care Transfer)    CRNA VITALS  7/24/2020 1159 - 7/24/2020 1238      7/24/2020             Pulse:  112    SpO2:  100 %                Electronically Signed By: SANCHEZ Prabhakar CRNA  July 24, 2020  12:38 PM

## 2020-07-24 NOTE — ANESTHESIA POSTPROCEDURE EVALUATION
Patient: Brie Brown    Procedure(s):  LAPAROSCOPIC CHOLECYSTECTOMY    Diagnosis:Cholelithiasis [K80.20]  Diagnosis Additional Information: No value filed.    Anesthesia Type:  General    Note:  Anesthesia Post Evaluation    Patient location during evaluation: bedside  Patient participation: Able to fully participate in evaluation  Level of consciousness: awake  Pain management: adequate  Airway patency: patent  Cardiovascular status: acceptable  Respiratory status: acceptable  Hydration status: acceptable  PONV: none     Anesthetic complications: None    Comments: No anesthetic complications noted.         Last vitals:  Vitals:    07/24/20 1315 07/24/20 1422 07/24/20 1503   BP: 109/67 117/74 116/73   Pulse:      Resp: 16 16 14   Temp: 37.5  C (99.5  F) 36.8  C (98.2  F)    SpO2: 99% 98% 98%         Electronically Signed By: Bertin Tony DO, DO  July 24, 2020  5:06 PM

## 2020-07-24 NOTE — ANESTHESIA PREPROCEDURE EVALUATION
Anesthesia Pre-Procedure Evaluation    Patient: Brie Brown   MRN: [id number] : 1985          Preoperative Diagnosis: Cholelithiasis [K80.20]    Procedure(s):  LAPAROSCOPIC CHOLECYSTECTOMY    Past Medical History:   Diagnosis Date     Abnormal Pap smear 2009    normals since     Anemia 2016    during last pregnancy     Anxiety      LSIL (low grade squamous intraepithelial lesion) on Pap smear 09     NO ACTIVE PROBLEMS      Past Surgical History:   Procedure Laterality Date     ENT SURGERY      Tonsils and adenoids removed when very young       Anesthesia Evaluation     .             ROS/MED HX    ENT/Pulmonary:      (-) sleep apnea   Neurologic:  - neg neurologic ROS     Cardiovascular:  - neg cardiovascular ROS       METS/Exercise Tolerance:     Hematologic:  - neg hematologic  ROS       Musculoskeletal:  - neg musculoskeletal ROS       GI/Hepatic:     (+) cholecystitis/cholelithiasis,       Renal/Genitourinary:  - ROS Renal section negative       Endo:  - neg endo ROS       Psychiatric:     (+) psychiatric history anxiety and depression      Infectious Disease:  - neg infectious disease ROS       Malignancy:      - no malignancy   Other:                          Physical Exam  Normal systems: cardiovascular, pulmonary and dental    Airway   Mallampati: II  TM distance: >3 FB  Neck ROM: full    Dental     Cardiovascular       Pulmonary             Lab Results   Component Value Date    WBC 8.7 2020    HGB 12.6 2020    HCT 37.9 2020     2020     2020    POTASSIUM 4.1 2020    CHLORIDE 106 2020    CO2 25 2020    BUN 16 2020    CR 0.72 2020    GLC 93 2020    CUCA 9.1 2020    ALBUMIN 4.1 2020    PROTTOTAL 7.6 2020    ALT 21 2020    AST 15 2020    ALKPHOS 67 2020    BILITOTAL 0.6 2020    LIPASE 88 2020    TSH 1.32 2011       Preop Vitals  BP Readings from Last 3 Encounters:  "  07/24/20 105/65   07/23/20 100/66   06/22/20 124/67    Pulse Readings from Last 3 Encounters:   07/24/20 83   07/23/20 81   06/22/20 74      Resp Readings from Last 3 Encounters:   07/24/20 18   06/22/20 20   07/26/19 14    SpO2 Readings from Last 3 Encounters:   07/24/20 99%   07/23/20 100%   06/22/20 99%      Temp Readings from Last 1 Encounters:   07/23/20 36.8  C (98.2  F) (Tympanic)    Ht Readings from Last 1 Encounters:   07/24/20 1.727 m (5' 8\")      Wt Readings from Last 1 Encounters:   07/24/20 67.9 kg (149 lb 12.8 oz)    Estimated body mass index is 22.78 kg/m  as calculated from the following:    Height as of this encounter: 1.727 m (5' 8\").    Weight as of this encounter: 67.9 kg (149 lb 12.8 oz).       Anesthesia Plan      History & Physical Review  History and physical reviewed and following examination; no interval change.    ASA Status:  2 .    NPO Status:  > 8 hours    Plan for General (ETT RSI) with Intravenous induction. Maintenance will be Balanced.    PONV prophylaxis:  Ondansetron (or other 5HT-3) and Dexamethasone or Solumedrol         Postoperative Care  Postoperative pain management:  IV analgesics.      Consents  Anesthetic plan, risks, benefits and alternatives discussed with:  Patient..                 Bertin Tony, DO, DO  "

## 2020-07-24 NOTE — OR NURSING
Discharge instructions reviewed with pt and her  (by phone).  Pt continues to c/o nausea and feels unready to go home yet.  Will continue to monitor.

## 2020-07-24 NOTE — DISCHARGE INSTRUCTIONS
[facility] Discharge Instructions for  Sedation and General Anesthesia       It's not unusual to feel dizzy, light-headed or faint for up to 24 hours after surgery or while taking pain medication.  If you have these symptoms: sit for a few minutes before standing and have someone assist you when you get up to walk or use the bathroom.      You should rest and relax for the next 24 hours. We recommend you make arrangements to have an adult stay with you for at least 24 hours after your discharge.  Avoid hazardous and strenuous activity.      DO NOT DRIVE any vehicle or operate mechanical equipment for 24 hours following the end of your surgery.  Even though you may feel normal, your reactions may be affected by the medication you have received.      Do not drink alcoholic beverages for 24 hours following surgery.       Slowly progress to your regular diet as you feel able. It's not unusual to feel nauseated and/or vomit after receiving anesthesia.  If you develop these symptoms, drink clear liquids (apple juice, ginger ale, broth, 7-up, etc. ) until you feel better.  If your nausea and vomiting persists for 24 hours, please notify your surgeon.        All narcotic pain medications, along with inactivity and anesthesia, can cause constipation. Drinking plenty of liquids and increasing fiber intake will help.      For any questions of a medical nature, call your surgeon.      Do not make important decisions for 24 hours.      If you had general anesthesia, you may have a sore throat for a couple of days related to the breathing tube used during surgery.  You may use Cepacol lozenges to help with this discomfort.  If it worsens or if you develop a fever, contact your surgeon.       If you feel your pain is not well managed with the pain medications prescribed by your surgeon, please contact your surgeon's office to let them know so they can address your concerns.       CoVid 19 Information    We want to give you  information regarding Covid. Please consult your primary care provider with any questions you might have.     Patient who have symptoms (cough, fever, or shortness of breath), need to isolate for 7 days from when symptoms started OR 72 hours after fever resolves (without fever reducing medications) AND improvement of respiratory symptoms (whichever is longer).      Isolate yourself at home (in own room/own bathroom if possible)    Do Not allow any visitors    Do Not go to work or school    Do Not go to [employer],  centers, shopping, or other public places.    Do Not shake hands.    Avoid close and intimate contact with others (hugging, kissing).    Follow CDC recommendations for household cleaning of frequently touched services.     After the initial 7 days, continue to isolate yourself from household members as much as possible. To continue decrease the risk of community spread and exposure, you and any members of your household should limit activities in public for 14 days after starting home isolation.     You can reference the following CDC link for helpful home isolation/care tips:  https://www.cdc.gov/coronavirus/2019-ncov/downloads/10Things.pdf    Protect Others:    Cover Your Mouth and Nose with a mask, disposable tissue or wash cloth to avoid spreading germs to others.    Wash your hands and face frequently with soap and water    Call Your Primary Doctor If: Breathing difficulty develops or you become worse.    For more information about COVID19 and options for caring for yourself at home, please visit the CDC website at https://www.cdc.gov/coronavirus/2019-ncov/about/steps-when-sick.html  For more options for care at [facility], please visit our website at [email or web address]    Today you received Toradol, an antiinflammatory medication similar to Ibuprofen.  You should not take other antiinflammatory medication, such as Ibuprofen, Motrin, Advil, Aleve, Naprosyn,  etc until 8:30 p.m.         [facility]  Discharge Instructions: Post-Operative Laparoscopic Cholecystectomy    ACTIVITY    Expect to feel tired after your surgery.  This will gradually resolve.      Take frequent, short walks and increase your activity gradually.      Avoid strenuous physical activity or heavy lifting greater than 15-20 lbs. for 2-3 weeks.  You may climb stairs.    You may drive without restrictions when you are not using any prescription pain medication and feel comfortable in a car.    You may return to work/school when you are comfortable without any prescription pain medication.    WOUND CARE    You may remove your outer dressing or Band-Aids and shower 48 hours after the surgery.  Pat your incisions dry and leave them open to air.  Re-apply dressing (Band-Aids or gauze/tape) as needed for comfort or drainage.    You have steri-strips (looks like white tape) on your incision.  You may peel off the steri-strips 2 weeks after your surgery if they have not peeled off on their own.     Do not soak your incisions in a tub or pool for 2 weeks.     Do not apply any lotions, creams, or ointments to your incisions.    A ridge under your incisions is normal and will gradually resolve.    DIET    Start with liquids, then gradually resume your regular diet as tolerated.  Avoid heavy, spicy, and greasy meals for 2-3 days.    Drink plenty of fluids to stay hydrated.    It is not uncommon to experience some loose stools or diarrhea after surgery.  This is your body s way of adapting to the bile which will slowly drain into your intestine.  A low fat diet may help with this.  This should improve over 1-2 months.    PAIN    Expect some tenderness and discomfort at the incision sites.  Use the prescribed pain medication at your discretion.  Expect gradual resolution of your pain over several days.    You may take ibuprofen with food (unless you have been told not to)  instead of or in addition to your prescribed pain medication.  If you are taking Norco or Percocet, do not take any additional acetaminophen/APAP/Tylenol.    Do not drink alcohol or drive while you are taking pain medications.    You may apply ice to your incisions in 20 minute intervals as needed for the next 48 hours.  After that time, consider switching to heat if you prefer.    EXPECTATIONS    Pain medications can cause constipation.  Limit use when possible.  Take over the counter stool softener/stimulant, such as Colace or Senna, 1-2 times a day with plenty of water.  You may take a mild over the counter laxative, such as Miralax or a suppository, as needed.  You may discontinue these medications once you are having regular bowel movements and/or are no longer taking your narcotic pain medication.       You may have shoulder or upper back discomfort due to the gas used in surgery.  This is temporary and should resolve in 48-72 hours.  Short, frequent walks may help with this.    FOLLOW UP    Our office will contact you in approximately 2 weeks to check on your progress and answer any questions you may have.  If you are doing well, you will not need to return for a follow up appointment.  If any concerns are identified over the phone, we will help you make an appointment to see a provider.     If you have not received a phone call, have any questions or concerns, or would like to be seen, please call us at [phone] and ask to speak with our nurse.  We are located at [address] [place] [address].    CALL OUR OFFICE -[phone] IF YOU HAVE:     Chills or fever above 101 F.    Increased redness, warmth, or drainage at your incisions.    Significant bleeding.    Pain not relieved by your pain medication or rest.    Increasing pain after the first 48 hours.    Any other concerns or questions.    Revised July 2018

## 2020-07-27 LAB — COPATH REPORT: NORMAL

## 2020-08-04 ENCOUNTER — TELEPHONE (OUTPATIENT)
Dept: SURGERY | Facility: CLINIC | Age: 35
End: 2020-08-04

## 2020-08-04 NOTE — TELEPHONE ENCOUNTER
[facility]  Post op call note  August 4, 2020       Brie Brown was called for an update regarding her recovery.  There was no answer and a message was left encouraging her to call us back with any questions, concerns, or to provide an update.        Socorro Justice PA-C  [facility]  [phone]

## 2020-08-24 ENCOUNTER — APPOINTMENT (OUTPATIENT)
Dept: ULTRASOUND IMAGING | Facility: CLINIC | Age: 35
End: 2020-08-24
Attending: EMERGENCY MEDICINE
Payer: COMMERCIAL

## 2020-08-24 ENCOUNTER — APPOINTMENT (OUTPATIENT)
Dept: CT IMAGING | Facility: CLINIC | Age: 35
End: 2020-08-24
Attending: EMERGENCY MEDICINE
Payer: COMMERCIAL

## 2020-08-24 ENCOUNTER — HOSPITAL ENCOUNTER (EMERGENCY)
Facility: CLINIC | Age: 35
Discharge: HOME | End: 2020-08-24
Attending: EMERGENCY MEDICINE | Admitting: EMERGENCY MEDICINE
Payer: COMMERCIAL

## 2020-08-24 VITALS
RESPIRATION RATE: 20 BRPM | DIASTOLIC BLOOD PRESSURE: 75 MMHG | HEIGHT: 68 IN | TEMPERATURE: 98.4 F | SYSTOLIC BLOOD PRESSURE: 103 MMHG | WEIGHT: 148 LBS | BODY MASS INDEX: 22.43 KG/M2 | OXYGEN SATURATION: 99 % | HEART RATE: 61 BPM

## 2020-08-24 DIAGNOSIS — R79.89 ELEVATED D-DIMER: ICD-10-CM

## 2020-08-24 DIAGNOSIS — R10.13 EPIGASTRIC PAIN: ICD-10-CM

## 2020-08-24 LAB
ALBUMIN SERPL-MCNC: 4.1 G/DL (ref 3.4–5)
ALBUMIN UR-MCNC: NEGATIVE MG/DL
ALP SERPL-CCNC: 72 U/L (ref 40–150)
ALT SERPL W P-5'-P-CCNC: 62 U/L (ref 0–50)
AMORPH CRY #/AREA URNS HPF: ABNORMAL /HPF
ANION GAP SERPL CALCULATED.3IONS-SCNC: 6 MMOL/L (ref 3–14)
APPEARANCE UR: ABNORMAL
AST SERPL W P-5'-P-CCNC: 143 U/L (ref 0–45)
B-HCG FREE SERPL-ACNC: <5 IU/L
BACTERIA #/AREA URNS HPF: ABNORMAL /HPF
BASOPHILS # BLD AUTO: 0 10E9/L (ref 0–0.2)
BASOPHILS NFR BLD AUTO: 0.2 %
BILIRUB SERPL-MCNC: 0.6 MG/DL (ref 0.2–1.3)
BILIRUB UR QL STRIP: NEGATIVE
BUN SERPL-MCNC: 13 MG/DL (ref 7–30)
CALCIUM SERPL-MCNC: 9.3 MG/DL (ref 8.5–10.1)
CHLORIDE SERPL-SCNC: 107 MMOL/L (ref 94–109)
CO2 SERPL-SCNC: 25 MMOL/L (ref 20–32)
COLOR UR AUTO: YELLOW
CREAT SERPL-MCNC: 0.66 MG/DL (ref 0.52–1.04)
D DIMER PPP FEU-MCNC: >20 UG/ML FEU (ref 0–0.5)
DIFFERENTIAL METHOD BLD: NORMAL
EOSINOPHIL # BLD AUTO: 0.4 10E9/L (ref 0–0.7)
EOSINOPHIL NFR BLD AUTO: 4.5 %
ERYTHROCYTE [DISTWIDTH] IN BLOOD BY AUTOMATED COUNT: 13.1 % (ref 10–15)
GFR SERPL CREATININE-BSD FRML MDRD: >90 ML/MIN/{1.73_M2}
GLUCOSE SERPL-MCNC: 145 MG/DL (ref 70–99)
GLUCOSE UR STRIP-MCNC: NEGATIVE MG/DL
HCT VFR BLD AUTO: 37.6 % (ref 35–47)
HGB BLD-MCNC: 12.6 G/DL (ref 11.7–15.7)
HGB UR QL STRIP: ABNORMAL
IMM GRANULOCYTES # BLD: 0 10E9/L (ref 0–0.4)
IMM GRANULOCYTES NFR BLD: 0.1 %
KETONES UR STRIP-MCNC: 5 MG/DL
LEUKOCYTE ESTERASE UR QL STRIP: NEGATIVE
LIPASE SERPL-CCNC: 100 U/L (ref 73–393)
LYMPHOCYTES # BLD AUTO: 4.5 10E9/L (ref 0.8–5.3)
LYMPHOCYTES NFR BLD AUTO: 51.1 %
MCH RBC QN AUTO: 30.3 PG (ref 26.5–33)
MCHC RBC AUTO-ENTMCNC: 33.5 G/DL (ref 31.5–36.5)
MCV RBC AUTO: 90 FL (ref 78–100)
MONOCYTES # BLD AUTO: 0.5 10E9/L (ref 0–1.3)
MONOCYTES NFR BLD AUTO: 5.5 %
MUCOUS THREADS #/AREA URNS LPF: PRESENT /LPF
NEUTROPHILS # BLD AUTO: 3.4 10E9/L (ref 1.6–8.3)
NEUTROPHILS NFR BLD AUTO: 38.6 %
NITRATE UR QL: NEGATIVE
NRBC # BLD AUTO: 0 10*3/UL
NRBC BLD AUTO-RTO: 0 /100
PH UR STRIP: 7 PH (ref 5–7)
PLATELET # BLD AUTO: 276 10E9/L (ref 150–450)
POTASSIUM SERPL-SCNC: 3.9 MMOL/L (ref 3.4–5.3)
PROT SERPL-MCNC: 7.8 G/DL (ref 6.8–8.8)
RBC # BLD AUTO: 4.16 10E12/L (ref 3.8–5.2)
RBC #/AREA URNS AUTO: 7 /HPF (ref 0–2)
SODIUM SERPL-SCNC: 138 MMOL/L (ref 133–144)
SOURCE: ABNORMAL
SP GR UR STRIP: 1.02 (ref 1–1.03)
UROBILINOGEN UR STRIP-MCNC: 2 MG/DL (ref 0–2)
WBC # BLD AUTO: 8.8 10E9/L (ref 4–11)
WBC #/AREA URNS AUTO: 17 /HPF (ref 0–5)

## 2020-08-24 PROCEDURE — 96375 TX/PRO/DX INJ NEW DRUG ADDON: CPT

## 2020-08-24 PROCEDURE — 85379 FIBRIN DEGRADATION QUANT: CPT | Performed by: EMERGENCY MEDICINE

## 2020-08-24 PROCEDURE — 25000128 H RX IP 250 OP 636: Performed by: EMERGENCY MEDICINE

## 2020-08-24 PROCEDURE — 81001 URINALYSIS AUTO W/SCOPE: CPT | Performed by: EMERGENCY MEDICINE

## 2020-08-24 PROCEDURE — 71275 CT ANGIOGRAPHY CHEST: CPT

## 2020-08-24 PROCEDURE — 99285 EMERGENCY DEPT VISIT HI MDM: CPT | Mod: 25

## 2020-08-24 PROCEDURE — 25000125 ZZHC RX 250: Performed by: EMERGENCY MEDICINE

## 2020-08-24 PROCEDURE — 84702 CHORIONIC GONADOTROPIN TEST: CPT

## 2020-08-24 PROCEDURE — 80053 COMPREHEN METABOLIC PANEL: CPT | Performed by: EMERGENCY MEDICINE

## 2020-08-24 PROCEDURE — 96374 THER/PROPH/DIAG INJ IV PUSH: CPT | Mod: 59

## 2020-08-24 PROCEDURE — 85025 COMPLETE CBC W/AUTO DIFF WBC: CPT | Performed by: EMERGENCY MEDICINE

## 2020-08-24 PROCEDURE — 83690 ASSAY OF LIPASE: CPT | Performed by: EMERGENCY MEDICINE

## 2020-08-24 PROCEDURE — 76705 ECHO EXAM OF ABDOMEN: CPT

## 2020-08-24 RX ORDER — MORPHINE SULFATE 4 MG/ML
4 INJECTION, SOLUTION INTRAMUSCULAR; INTRAVENOUS
Status: COMPLETED | OUTPATIENT
Start: 2020-08-24 | End: 2020-08-24

## 2020-08-24 RX ORDER — IOPAMIDOL 755 MG/ML
59 INJECTION, SOLUTION INTRAVASCULAR ONCE
Status: COMPLETED | OUTPATIENT
Start: 2020-08-24 | End: 2020-08-24

## 2020-08-24 RX ORDER — ONDANSETRON 2 MG/ML
4 INJECTION INTRAMUSCULAR; INTRAVENOUS EVERY 30 MIN PRN
Status: COMPLETED | OUTPATIENT
Start: 2020-08-24 | End: 2020-08-24

## 2020-08-24 RX ORDER — HYDROMORPHONE HYDROCHLORIDE 1 MG/ML
0.5 INJECTION, SOLUTION INTRAMUSCULAR; INTRAVENOUS; SUBCUTANEOUS ONCE
Status: COMPLETED | OUTPATIENT
Start: 2020-08-24 | End: 2020-08-24

## 2020-08-24 RX ORDER — OXYCODONE HYDROCHLORIDE 5 MG/1
5 TABLET ORAL EVERY 6 HOURS PRN
Qty: 12 TABLET | Refills: 0 | Status: SHIPPED | OUTPATIENT
Start: 2020-08-24 | End: 2021-02-04

## 2020-08-24 RX ADMIN — IOPAMIDOL 59 ML: 755 INJECTION, SOLUTION INTRAVENOUS at 20:21

## 2020-08-24 RX ADMIN — SODIUM CHLORIDE 85 ML: 9 INJECTION, SOLUTION INTRAVENOUS at 20:21

## 2020-08-24 RX ADMIN — HYDROMORPHONE HYDROCHLORIDE 0.5 MG: 1 INJECTION, SOLUTION INTRAMUSCULAR; INTRAVENOUS; SUBCUTANEOUS at 17:22

## 2020-08-24 RX ADMIN — MORPHINE SULFATE 4 MG: 4 INJECTION, SOLUTION INTRAMUSCULAR; INTRAVENOUS at 12:58

## 2020-08-24 RX ADMIN — ONDANSETRON 4 MG: 2 INJECTION INTRAMUSCULAR; INTRAVENOUS at 12:59

## 2020-08-24 ASSESSMENT — MIFFLIN-ST. JEOR: SCORE: 1424.82

## 2020-08-24 NOTE — ED PROVIDER NOTES
"  History     Chief Complaint:  Abdominal Pain    HPI   Brie Brown is a 35 year old female who presents status post cholecystectomy 1 month ago with abdominal pain. The patient reports onset of sudden severe epigastric pain that radiates into her back. The pain has been moderately relieved with 4 mg of morphine. The patient denies any complications from her gallbladder surgery at this time. The patient denies any fever, cough or shortness of breath.     Allergies:  Augmentin   Seasonal Allergies    Medications:    Zoloft   Senokot     Past Medical History:    Anemia   Anxiety   Post partum depression   Cholelithiasis   Abnormal Pap smear- LGSIL    Past Surgical History:    Tonsillectomy and adenoidectomy    Cholecystectomy     Family History:    Hyperlipidemia - mother, father  Hypertension - father   Depression - father, sister    Social History:  Smoking Status: Never Smoker  Smokeless Tobacco: Never Used  Alcohol Use: Positive  Marital Status:       Review of Systems  10 systems reviewed and negative except as above and in HPI.    Physical Exam     Patient Vitals for the past 24 hrs:   BP Temp Temp src Pulse Resp SpO2 Height Weight   08/24/20 1900 103/75 -- -- 61 -- 99 % -- --   08/24/20 1800 108/74 -- -- 63 -- 99 % -- --   08/24/20 1730 116/71 -- -- 73 -- 98 % -- --   08/24/20 1725 -- -- -- -- -- 100 % -- --   08/24/20 1720 111/74 -- -- 65 -- -- -- --   08/24/20 1600 119/75 -- -- 71 -- 100 % -- --   08/24/20 1555 -- -- -- -- -- 100 % -- --   08/24/20 1550 118/77 -- -- 71 -- -- -- --   08/24/20 1244 119/70 98.4  F (36.9  C) Oral 80 20 100 % 1.727 m (5' 8\") 67.1 kg (148 lb)       Physical Exam  General:     Resting on the gurney, appears uncomfortable.   Head:  The scalp, face, and head appear normal  Mouth/Throat: Mucus membranes are moist  CV:  Regular rate    Normal S1 and S2  No pathological murmur   Resp:  Breath sounds clear and equal bilaterally    Non-labored, no retractions or accessory muscle " use    No coarseness    No wheezing   GI:  Abdomen is soft, no rigidity    Epigastric tenderness to palpation. No guarding. No rebound.  MS:  Normal motor assessment of all extremities.    Good capillary refill noted.  Skin:  No rash or lesions noted.  Neuro:  Speech is normal and fluent. No apparent deficit.  Psych:  Awake. Alert.  Normal affect.      Appropriate interactions.    Emergency Department Course     Imaging:  Radiology findings were communicated with the patient who voiced understanding of the findings.    Abdomen US, limited (RUQ only)  1.  Prior cholecystectomy.   2.  Mild hepatomegaly.     CT Chest (PE) Abdomen Pelvis w Contrast  1.  No acute abnormality demonstrated in the chest. Specifically,   there is no evidence of pulmonary embolism.   2.  Enlarged, edematous liver suggestive of acute hepatitis.   3.  Small amount of ascites.     Reading per radiology.    Laboratory:  Laboratory findings were communicated with the patient who voiced understanding of the findings.    CBC: WBC 8.8, HGB 12.6,     CMP: Glucose 145 (H) o/w WNL (Creatinine 0.66)    D dimer quantitative: >20.0 (H)    Lipase: 100    HCG Quantitative: <5.0    UA w micro: Ketones 5 (A), Blood small (A), RBC/HPF 7 (H), WBC/HPF 17 (H), Bacteria few (A), Mucous present (A), Amorphous crystals few (A)    Interventions:  1258 Morphine 4 mg, IV   1259 Zofran, 4 mg, IV   1722 Dilaudid, 0.5 mg, IV     Emergency Department Course:     Nursing notes and vitals reviewed.    1553 The patient provided a urine sample here in the emergency department. This was sent for laboratory testing, findings above.    1554 IV was inserted and blood was drawn for laboratory testing, results above.    1600 I performed an exam of the patient as documented above.     1632 The patient was sent for a US while in the emergency department, results above.     1828 Patient rechecked and updated.      2017 The patient was sent for a CT while in the emergency  department, results above.     2115 Patient rechecked and updated.      2113 Findings and plan explained to the Patient. Patient discharged home with instructions regarding supportive care, medications, and reasons to return. The importance of close follow-up was reviewed. The patient was prescribed oxycodone.    Impression & Plan      Medical Decision Making:  Brie Brown is a 35 year old female who presents to the emergency department today for evaluation of abdominal pain 1 month after her cholecystectomy.  The pain was severe and caused sweating and nausea.  The pain went in to her back as well.  She also complained of pain with breathing.  Given her recent surgery and pleuritic pain I did check a d-dimer which was very elevated.  CT was obtained of the chest to evaluate for PE as well as through the abdomen to evaluate for surgical complication.  Interestingly her imaging was unremarkable.  I spoke with Dr. jones as her pain sounded like be related to a stone he will see her within 48 hours as an outpatient with plan to likely EUS and EGD.  I recommended she be n.p.o. at midnight tonight in case he is able to see her tomorrow and she is aware that he will most likely see her the following day.  She was provided a prescription for pain medication and will follow-up as instructed.  She is comfortable with this plan and is discharged with strict return precautions and follow-up instructions.      Diagnosis:    ICD-10-CM    1. Epigastric pain  R10.13    2. Elevated d-dimer  R79.89        Disposition:   Patient is discharged home.     Discharge Medications:     START taking      Dose / Directions   oxyCODONE 5 MG tablet  Commonly known as:  ROXICODONE      Dose:  5 mg  Take 1 tablet (5 mg) by mouth every 6 hours as needed for pain  Quantity:  12 tablet  Refills:  0       Scribe Disclosure:  Kymberly DIETZ, am serving as a scribe at 4:10 PM on 8/24/2020 to document services personally performed by Cristin  Berna VILLATORO MD  based on my observations and the provider's statements to me.   EMERGENCY DEPARTMENT       Berna Amaral MD  08/24/20 3986

## 2020-08-24 NOTE — ED AVS SNAPSHOT
Emergency Department  [address] [facility] [address]  Phone:  [phone]  Fax:  [phone]                                    Brie Brown   MRN: [id number]    Department:   Emergency Department   Date of Visit:  8/24/2020           After Visit Summary Signature Page    I have received my discharge instructions, and my questions have been answered. I have discussed any challenges I see with this plan with the nurse or doctor.    ..........................................................................................................................................  Patient/Patient Representative Signature      ..........................................................................................................................................  Patient Representative Print Name and Relationship to Patient    ..................................................               ................................................  Date                                   Time    ..........................................................................................................................................  Reviewed by Signature/Title    ...................................................              ..............................................  Date                                               Time          22EPIC Rev 08/18

## 2020-08-26 ENCOUNTER — ANESTHESIA EVENT (OUTPATIENT)
Dept: SURGERY | Facility: CLINIC | Age: 35
End: 2020-08-26
Payer: COMMERCIAL

## 2020-08-26 ENCOUNTER — TRANSFERRED RECORDS (OUTPATIENT)
Dept: HEALTH INFORMATION MANAGEMENT | Facility: CLINIC | Age: 35
End: 2020-08-26

## 2020-08-26 ENCOUNTER — HOSPITAL ENCOUNTER (OUTPATIENT)
Facility: CLINIC | Age: 35
Discharge: HOME | End: 2020-08-27
Attending: EMERGENCY MEDICINE | Admitting: INTERNAL MEDICINE
Payer: COMMERCIAL

## 2020-08-26 ENCOUNTER — DOCUMENTATION ONLY (OUTPATIENT)
Dept: GASTROENTEROLOGY | Facility: CLINIC | Age: 35
End: 2020-08-26

## 2020-08-26 ENCOUNTER — ANESTHESIA (OUTPATIENT)
Dept: SURGERY | Facility: CLINIC | Age: 35
End: 2020-08-26
Payer: COMMERCIAL

## 2020-08-26 ENCOUNTER — APPOINTMENT (OUTPATIENT)
Dept: GENERAL RADIOLOGY | Facility: CLINIC | Age: 35
End: 2020-08-26
Attending: INTERNAL MEDICINE
Payer: COMMERCIAL

## 2020-08-26 ENCOUNTER — PREP FOR PROCEDURE (OUTPATIENT)
Dept: GASTROENTEROLOGY | Facility: CLINIC | Age: 35
End: 2020-08-26

## 2020-08-26 DIAGNOSIS — K80.50 CHOLEDOCHOLITHIASIS: Primary | ICD-10-CM

## 2020-08-26 DIAGNOSIS — Z20.828 EXPOSURE TO SARS-ASSOCIATED CORONAVIRUS: ICD-10-CM

## 2020-08-26 DIAGNOSIS — K80.50 CHOLEDOCHOLITHIASIS: ICD-10-CM

## 2020-08-26 LAB
ALBUMIN SERPL-MCNC: 3.6 G/DL (ref 3.4–5)
ALP SERPL-CCNC: 246 U/L (ref 40–150)
ALT SERPL W P-5'-P-CCNC: 1064 U/L (ref 0–50)
AMYLASE SERPL-CCNC: 49 U/L (ref 30–110)
ANION GAP SERPL CALCULATED.3IONS-SCNC: 5 MMOL/L (ref 3–14)
AST SERPL W P-5'-P-CCNC: 698 U/L (ref 0–45)
BASOPHILS # BLD AUTO: 0 10E9/L (ref 0–0.2)
BASOPHILS NFR BLD AUTO: 0.4 %
BILIRUB SERPL-MCNC: 8.3 MG/DL (ref 0.2–1.3)
BUN SERPL-MCNC: 5 MG/DL (ref 7–30)
CALCIUM SERPL-MCNC: 9.1 MG/DL (ref 8.5–10.1)
CHLORIDE SERPL-SCNC: 108 MMOL/L (ref 94–109)
CO2 SERPL-SCNC: 26 MMOL/L (ref 20–32)
CREAT SERPL-MCNC: 0.64 MG/DL (ref 0.52–1.04)
DIFFERENTIAL METHOD BLD: NORMAL
EOSINOPHIL # BLD AUTO: 0.3 10E9/L (ref 0–0.7)
EOSINOPHIL NFR BLD AUTO: 4.7 %
ERYTHROCYTE [DISTWIDTH] IN BLOOD BY AUTOMATED COUNT: 13 % (ref 10–15)
GFR SERPL CREATININE-BSD FRML MDRD: >90 ML/MIN/{1.73_M2}
GLUCOSE BLDC GLUCOMTR-MCNC: 76 MG/DL (ref 70–99)
GLUCOSE BLDC GLUCOMTR-MCNC: 85 MG/DL (ref 70–99)
GLUCOSE SERPL-MCNC: 94 MG/DL (ref 70–99)
HCG SERPL QL: NEGATIVE
HCT VFR BLD AUTO: 37.2 % (ref 35–47)
HGB BLD-MCNC: 12.1 G/DL (ref 11.7–15.7)
IMM GRANULOCYTES # BLD: 0 10E9/L (ref 0–0.4)
IMM GRANULOCYTES NFR BLD: 0.4 %
INR PPP: 1.12 (ref 0.86–1.14)
LABORATORY COMMENT REPORT: NORMAL
LIPASE SERPL-CCNC: 294 U/L (ref 73–393)
LIPASE SERPL-CCNC: 81 U/L (ref 73–393)
LYMPHOCYTES # BLD AUTO: 1.1 10E9/L (ref 0.8–5.3)
LYMPHOCYTES NFR BLD AUTO: 20.3 %
MCH RBC QN AUTO: 30.4 PG (ref 26.5–33)
MCHC RBC AUTO-ENTMCNC: 32.5 G/DL (ref 31.5–36.5)
MCV RBC AUTO: 94 FL (ref 78–100)
MONOCYTES # BLD AUTO: 0.3 10E9/L (ref 0–1.3)
MONOCYTES NFR BLD AUTO: 5.8 %
NEUTROPHILS # BLD AUTO: 3.8 10E9/L (ref 1.6–8.3)
NEUTROPHILS NFR BLD AUTO: 68.4 %
NRBC # BLD AUTO: 0 10*3/UL
NRBC BLD AUTO-RTO: 0 /100
PLATELET # BLD AUTO: 188 10E9/L (ref 150–450)
POTASSIUM SERPL-SCNC: 3.6 MMOL/L (ref 3.4–5.3)
PROT SERPL-MCNC: 7.2 G/DL (ref 6.8–8.8)
RBC # BLD AUTO: 3.98 10E12/L (ref 3.8–5.2)
SARS-COV-2 RNA SPEC QL NAA+PROBE: NEGATIVE
SARS-COV-2 RNA SPEC QL NAA+PROBE: NORMAL
SODIUM SERPL-SCNC: 139 MMOL/L (ref 133–144)
SPECIMEN SOURCE: NORMAL
SPECIMEN SOURCE: NORMAL
WBC # BLD AUTO: 5.5 10E9/L (ref 4–11)

## 2020-08-26 PROCEDURE — C1877 STENT, NON-COAT/COV W/O DEL: HCPCS | Performed by: INTERNAL MEDICINE

## 2020-08-26 PROCEDURE — 71000027 ZZH RECOVERY PHASE 2 EACH 15 MINS: Performed by: INTERNAL MEDICINE

## 2020-08-26 PROCEDURE — U0003 INFECTIOUS AGENT DETECTION BY NUCLEIC ACID (DNA OR RNA); SEVERE ACUTE RESPIRATORY SYNDROME CORONAVIRUS 2 (SARS-COV-2) (CORONAVIRUS DISEASE [COVID-19]), AMPLIFIED PROBE TECHNIQUE, MAKING USE OF HIGH THROUGHPUT TECHNOLOGIES AS DESCRIBED BY CMS-2020-01-R: HCPCS | Performed by: EMERGENCY MEDICINE

## 2020-08-26 PROCEDURE — 36000061 ZZH SURGERY LEVEL 3 W FLUORO 1ST 30 MIN - UMMC: Performed by: INTERNAL MEDICINE

## 2020-08-26 PROCEDURE — C1769 GUIDE WIRE: HCPCS | Performed by: INTERNAL MEDICINE

## 2020-08-26 PROCEDURE — 71000014 ZZH RECOVERY PHASE 1 LEVEL 2 FIRST HR: Performed by: INTERNAL MEDICINE

## 2020-08-26 PROCEDURE — C9803 HOPD COVID-19 SPEC COLLECT: HCPCS | Performed by: EMERGENCY MEDICINE

## 2020-08-26 PROCEDURE — 40000170 ZZH STATISTIC PRE-PROCEDURE ASSESSMENT II: Performed by: INTERNAL MEDICINE

## 2020-08-26 PROCEDURE — 40000277 XR SURGERY CARM FLUORO LESS THAN 5 MIN W STILLS: Mod: TC

## 2020-08-26 PROCEDURE — 99285 EMERGENCY DEPT VISIT HI MDM: CPT | Mod: Z6 | Performed by: EMERGENCY MEDICINE

## 2020-08-26 PROCEDURE — 25000128 H RX IP 250 OP 636: Performed by: ANESTHESIOLOGY

## 2020-08-26 PROCEDURE — 25800030 ZZH RX IP 258 OP 636: Performed by: NURSE ANESTHETIST, CERTIFIED REGISTERED

## 2020-08-26 PROCEDURE — 37000009 ZZH ANESTHESIA TECHNICAL FEE, EACH ADDTL 15 MIN: Performed by: INTERNAL MEDICINE

## 2020-08-26 PROCEDURE — 85025 COMPLETE CBC W/AUTO DIFF WBC: CPT | Performed by: EMERGENCY MEDICINE

## 2020-08-26 PROCEDURE — C1726 CATH, BAL DIL, NON-VASCULAR: HCPCS | Performed by: INTERNAL MEDICINE

## 2020-08-26 PROCEDURE — 25800030 ZZH RX IP 258 OP 636: Performed by: EMERGENCY MEDICINE

## 2020-08-26 PROCEDURE — 25000566 ZZH SEVOFLURANE, EA 15 MIN: Performed by: INTERNAL MEDICINE

## 2020-08-26 PROCEDURE — 25000128 H RX IP 250 OP 636: Performed by: NURSE ANESTHETIST, CERTIFIED REGISTERED

## 2020-08-26 PROCEDURE — 80053 COMPREHEN METABOLIC PANEL: CPT | Performed by: EMERGENCY MEDICINE

## 2020-08-26 PROCEDURE — 36000059 ZZH SURGERY LEVEL 3 EA 15 ADDTL MIN UMMC: Performed by: INTERNAL MEDICINE

## 2020-08-26 PROCEDURE — 85610 PROTHROMBIN TIME: CPT | Performed by: EMERGENCY MEDICINE

## 2020-08-26 PROCEDURE — 83690 ASSAY OF LIPASE: CPT | Performed by: INTERNAL MEDICINE

## 2020-08-26 PROCEDURE — 25000128 H RX IP 250 OP 636: Performed by: INTERNAL MEDICINE

## 2020-08-26 PROCEDURE — 25500064 ZZH RX 255 OP 636: Performed by: INTERNAL MEDICINE

## 2020-08-26 PROCEDURE — 27210794 ZZH OR GENERAL SUPPLY STERILE: Performed by: INTERNAL MEDICINE

## 2020-08-26 PROCEDURE — 00000146 ZZHCL STATISTIC GLUCOSE BY METER IP

## 2020-08-26 PROCEDURE — 82150 ASSAY OF AMYLASE: CPT | Performed by: INTERNAL MEDICINE

## 2020-08-26 PROCEDURE — 25000125 ZZHC RX 250: Performed by: NURSE ANESTHETIST, CERTIFIED REGISTERED

## 2020-08-26 PROCEDURE — 37000008 ZZH ANESTHESIA TECHNICAL FEE, 1ST 30 MIN: Performed by: INTERNAL MEDICINE

## 2020-08-26 PROCEDURE — 99285 EMERGENCY DEPT VISIT HI MDM: CPT | Mod: 25 | Performed by: EMERGENCY MEDICINE

## 2020-08-26 PROCEDURE — 84703 CHORIONIC GONADOTROPIN ASSAY: CPT | Performed by: EMERGENCY MEDICINE

## 2020-08-26 PROCEDURE — 83690 ASSAY OF LIPASE: CPT | Performed by: EMERGENCY MEDICINE

## 2020-08-26 PROCEDURE — 25000125 ZZHC RX 250: Performed by: INTERNAL MEDICINE

## 2020-08-26 DEVICE — STENT FREEMAN PANCREA FLEX 4FRX11CM W/O FLANGE SGL PIGTAIL: Type: IMPLANTABLE DEVICE | Site: PANCREAS | Status: FUNCTIONAL

## 2020-08-26 RX ORDER — ONDANSETRON 2 MG/ML
4 INJECTION INTRAMUSCULAR; INTRAVENOUS ONCE
Status: COMPLETED | OUTPATIENT
Start: 2020-08-27 | End: 2020-08-26

## 2020-08-26 RX ORDER — ESMOLOL HYDROCHLORIDE 10 MG/ML
INJECTION INTRAVENOUS PRN
Status: DISCONTINUED | OUTPATIENT
Start: 2020-08-26 | End: 2020-08-26

## 2020-08-26 RX ORDER — NALOXONE HYDROCHLORIDE 0.4 MG/ML
.1-.4 INJECTION, SOLUTION INTRAMUSCULAR; INTRAVENOUS; SUBCUTANEOUS
Status: DISCONTINUED | OUTPATIENT
Start: 2020-08-26 | End: 2020-08-27 | Stop reason: HOSPADM

## 2020-08-26 RX ORDER — SODIUM CHLORIDE 9 MG/ML
INJECTION, SOLUTION INTRAVENOUS CONTINUOUS
Status: DISCONTINUED | OUTPATIENT
Start: 2020-08-26 | End: 2020-08-27 | Stop reason: HOSPADM

## 2020-08-26 RX ORDER — SODIUM CHLORIDE, SODIUM LACTATE, POTASSIUM CHLORIDE, CALCIUM CHLORIDE 600; 310; 30; 20 MG/100ML; MG/100ML; MG/100ML; MG/100ML
INJECTION, SOLUTION INTRAVENOUS CONTINUOUS PRN
Status: DISCONTINUED | OUTPATIENT
Start: 2020-08-26 | End: 2020-08-26

## 2020-08-26 RX ORDER — INDOMETHACIN 50 MG/1
SUPPOSITORY RECTAL PRN
Status: DISCONTINUED | OUTPATIENT
Start: 2020-08-26 | End: 2020-08-26 | Stop reason: HOSPADM

## 2020-08-26 RX ORDER — LIDOCAINE HYDROCHLORIDE 20 MG/ML
INJECTION, SOLUTION INFILTRATION; PERINEURAL PRN
Status: DISCONTINUED | OUTPATIENT
Start: 2020-08-26 | End: 2020-08-26

## 2020-08-26 RX ORDER — IOPAMIDOL 510 MG/ML
INJECTION, SOLUTION INTRAVASCULAR PRN
Status: DISCONTINUED | OUTPATIENT
Start: 2020-08-26 | End: 2020-08-26 | Stop reason: HOSPADM

## 2020-08-26 RX ORDER — EPINEPHRINE IN SOD CHLOR,ISO 1 MG/10 ML
SYRINGE (ML) INTRAVENOUS PRN
Status: DISCONTINUED | OUTPATIENT
Start: 2020-08-26 | End: 2020-08-26 | Stop reason: HOSPADM

## 2020-08-26 RX ORDER — PROPOFOL 10 MG/ML
INJECTION, EMULSION INTRAVENOUS PRN
Status: DISCONTINUED | OUTPATIENT
Start: 2020-08-26 | End: 2020-08-26

## 2020-08-26 RX ORDER — INDOMETHACIN 50 MG/1
100 SUPPOSITORY RECTAL
Status: DISCONTINUED | OUTPATIENT
Start: 2020-08-26 | End: 2020-08-26 | Stop reason: HOSPADM

## 2020-08-26 RX ORDER — FLUMAZENIL 0.1 MG/ML
0.2 INJECTION, SOLUTION INTRAVENOUS
Status: DISCONTINUED | OUTPATIENT
Start: 2020-08-26 | End: 2020-08-27 | Stop reason: HOSPADM

## 2020-08-26 RX ORDER — DEXAMETHASONE SODIUM PHOSPHATE 4 MG/ML
INJECTION, SOLUTION INTRA-ARTICULAR; INTRALESIONAL; INTRAMUSCULAR; INTRAVENOUS; SOFT TISSUE PRN
Status: DISCONTINUED | OUTPATIENT
Start: 2020-08-26 | End: 2020-08-26

## 2020-08-26 RX ORDER — LIDOCAINE 40 MG/G
CREAM TOPICAL
Status: DISCONTINUED | OUTPATIENT
Start: 2020-08-26 | End: 2020-08-26 | Stop reason: HOSPADM

## 2020-08-26 RX ORDER — ONDANSETRON 2 MG/ML
INJECTION INTRAMUSCULAR; INTRAVENOUS PRN
Status: DISCONTINUED | OUTPATIENT
Start: 2020-08-26 | End: 2020-08-26

## 2020-08-26 RX ADMIN — DEXAMETHASONE SODIUM PHOSPHATE 4 MG: 4 INJECTION, SOLUTION INTRA-ARTICULAR; INTRALESIONAL; INTRAMUSCULAR; INTRAVENOUS; SOFT TISSUE at 21:06

## 2020-08-26 RX ADMIN — ESMOLOL HYDROCHLORIDE 30 MG: 10 INJECTION, SOLUTION INTRAVENOUS at 21:11

## 2020-08-26 RX ADMIN — ONDANSETRON 4 MG: 2 INJECTION INTRAMUSCULAR; INTRAVENOUS at 23:33

## 2020-08-26 RX ADMIN — Medication 100 MG: at 20:51

## 2020-08-26 RX ADMIN — SODIUM CHLORIDE, POTASSIUM CHLORIDE, SODIUM LACTATE AND CALCIUM CHLORIDE: 600; 310; 30; 20 INJECTION, SOLUTION INTRAVENOUS at 20:51

## 2020-08-26 RX ADMIN — SODIUM CHLORIDE: 9 INJECTION, SOLUTION INTRAVENOUS at 17:07

## 2020-08-26 RX ADMIN — ONDANSETRON 4 MG: 2 INJECTION INTRAMUSCULAR; INTRAVENOUS at 21:28

## 2020-08-26 RX ADMIN — PROPOFOL 150 MG: 10 INJECTION, EMULSION INTRAVENOUS at 20:51

## 2020-08-26 RX ADMIN — MIDAZOLAM 2 MG: 1 INJECTION INTRAMUSCULAR; INTRAVENOUS at 20:45

## 2020-08-26 RX ADMIN — LIDOCAINE HYDROCHLORIDE 100 MG: 20 INJECTION, SOLUTION INFILTRATION; PERINEURAL at 20:51

## 2020-08-26 ASSESSMENT — ENCOUNTER SYMPTOMS
ABDOMINAL PAIN: 1
VOMITING: 0
FEVER: 0
BACK PAIN: 1
SHORTNESS OF BREATH: 0
COUGH: 0
DYSURIA: 0

## 2020-08-26 NOTE — ED PROVIDER NOTES
"    [place] EMERGENCY DEPARTMENT ([facility])  August 26, 2020  History     Chief Complaint   Patient presents with     Abdominal Pain     The history is provided by the patient and medical records.     Brie Brown is a 35 year old female with a PMH for cholecystectomy 07/24/20 who presents to the ED with complaint of epigastric and right upper quadrant pain.    Per chart review, patient was seen 08/24/20 at [facility] emergency department for similar symptoms.  She was discharged from the emergency department but seen at GI clinic yesterday where she had very elevated LFTs.  Upper endoscopy was done this morning at an outside gastroenterology suite.  Endoscopic ultrasound firmed stone in the common bile duct as well as sludge in the common bile duct.  Patient has a note from today from Dr. Garza gastroenterology, who states \"this patient sounds as if she has an obstructing common duct stone with jaundice and possible cholangitis. I asked that she present to our ED now and we will request a rapid COVID test and proceed with ERCP as early as today for urgent decompression.\"    Patient reports her gallbladder was removed approximately 1 month ago. Patient states she had bad abdominal pain snce Monday, 8/24/20.  Patient reports having a fever of 100.8 F last night and was feeling really uncomfortable. She notes her eyes began to yellow yesterday along with having extremely dark urine. Patient notes diffuse lower back pain present with onset on her abdominal pain. Patient denies other medical issues.   She denies any cough, chest pain or shortness of breath.    Labs at done at her GI clinic   Yesterday:    Total bili 4.5   Alk phos - 196  AST - 1251  ALT - 1089   Lipase is 32  CRP 12      PAST MEDICAL HISTORY:   Past Medical History:   Diagnosis Date     Abnormal Pap smear 2009    normals since     Anemia 2016    during last pregnancy     Anxiety      LSIL (low grade squamous intraepithelial lesion) " on Pap smear 02/02/09     NO ACTIVE PROBLEMS        PAST SURGICAL HISTORY:   Past Surgical History:   Procedure Laterality Date     ENT SURGERY      Tonsils and adenoids removed when very young     LAPAROSCOPIC CHOLECYSTECTOMY N/A 7/24/2020    Procedure: LAPAROSCOPIC CHOLECYSTECTOMY;  Surgeon: Brandyn Luz MD;  Location:  OR       Past medical history, past surgical history, medications, and allergies were reviewed with the patient. Additional pertinent items: None    FAMILY HISTORY:   Family History   Problem Relation Age of Onset     Lipids Mother      Hypertension Father      Depression Father      Lipids Father      Heart Disease Paternal Grandfather         passsed away from MI     Prostate Cancer Paternal Grandfather      Cerebrovascular Disease Paternal Grandmother      Alcohol/Drug Paternal Grandmother      Arthritis Maternal Grandmother      Depression Sister        SOCIAL HISTORY:   Social History     Tobacco Use     Smoking status: Never Smoker     Smokeless tobacco: Never Used   Substance Use Topics     Alcohol use: Yes     Alcohol/week: 0.0 standard drinks     Comment: 5/week     Social history was reviewed with the patient. Additional pertinent items: None      Patient's Medications   New Prescriptions    No medications on file   Previous Medications    HYDROCODONE-ACETAMINOPHEN (NORCO) 5-325 MG TABLET    Take 1-2 tablets by mouth every 4 hours as needed for moderate to severe pain    OXYCODONE (ROXICODONE) 5 MG TABLET    Take 1 tablet (5 mg) by mouth every 6 hours as needed for pain    PRENATAL VIT-FE FUMARATE-FA (PRENATAL 19) CHEW        SENNA-DOCUSATE (SENOKOT-S/PERICOLACE) 8.6-50 MG TABLET    Take 1-2 tablets by mouth 2 times daily as needed for constipation    SERTRALINE (ZOLOFT) 50 MG TABLET    Take 1 tablet (50 mg) by mouth daily   Modified Medications    No medications on file   Discontinued Medications    No medications on file          Allergies   Allergen Reactions     Augmentin  "     High fever as a child     Seasonal Allergies         Review of Systems   Constitutional: Negative for fever.   Eyes:        Yellowing of eyes   Respiratory: Negative for cough and shortness of breath.    Cardiovascular: Negative for chest pain.   Gastrointestinal: Positive for abdominal pain. Negative for vomiting.   Genitourinary: Negative for dysuria.        Yellow urine since yesterday   Musculoskeletal: Positive for back pain.   Skin: Negative for rash.   All other systems reviewed and are negative.    A complete review of systems was performed with pertinent positives and negatives noted in the HPI, and all other systems negative.    Physical Exam   BP: 109/72  Pulse: 82  Temp: 98.4  F (36.9  C)  Resp: 16  Height: 172.7 cm (5' 8\")  SpO2: 98 %      Physical Exam    GEN:  Alert, well developed, no acute distress  HEENT:  PERRL, EOMI, Mucous membranes are moist.   Cardio:  RRR, no murmur, radial pulses equal bilaterally  PULM:  Lungs clear, good air movement, no wheezes, rales   Abd:  Soft, normal bowel sounds, there is right upper quadrant tenderness, no percussion tenderness  Back exam:  No CVA tenderness  Musculoskeletal:  normal range of motion, no lower extremity swelling or calf tenderness  Neuro:  Alert and oriented X3, Follows commands, moving all extremities spontaneously   Skin:  Warm, dry   ED Course   1:38 PM  The patient was seen and examined by Dr. Chaya Gurrola in [facility].       Procedures           Labs from today are shown below.       Results for orders placed or performed during the hospital encounter of 08/26/20 (from the past 24 hour(s))   CBC with platelets differential   Result Value Ref Range    WBC 5.5 4.0 - 11.0 10e9/L    RBC Count 3.98 3.8 - 5.2 10e12/L    Hemoglobin 12.1 11.7 - 15.7 g/dL    Hematocrit 37.2 35.0 - 47.0 %    MCV 94 78 - 100 fl    MCH 30.4 26.5 - 33.0 pg    MCHC 32.5 31.5 - 36.5 g/dL    RDW 13.0 10.0 - 15.0 %    Platelet Count 188 150 - 450 10e9/L    Diff Method " Automated Method     % Neutrophils 68.4 %    % Lymphocytes 20.3 %    % Monocytes 5.8 %    % Eosinophils 4.7 %    % Basophils 0.4 %    % Immature Granulocytes 0.4 %    Nucleated RBCs 0 0 /100    Absolute Neutrophil 3.8 1.6 - 8.3 10e9/L    Absolute Lymphocytes 1.1 0.8 - 5.3 10e9/L    Absolute Monocytes 0.3 0.0 - 1.3 10e9/L    Absolute Eosinophils 0.3 0.0 - 0.7 10e9/L    Absolute Basophils 0.0 0.0 - 0.2 10e9/L    Abs Immature Granulocytes 0.0 0 - 0.4 10e9/L    Absolute Nucleated RBC 0.0    Comprehensive metabolic panel   Result Value Ref Range    Sodium 139 133 - 144 mmol/L    Potassium 3.6 3.4 - 5.3 mmol/L    Chloride 108 94 - 109 mmol/L    Carbon Dioxide 26 20 - 32 mmol/L    Anion Gap 5 3 - 14 mmol/L    Glucose 94 70 - 99 mg/dL    Urea Nitrogen 5 (L) 7 - 30 mg/dL    Creatinine 0.64 0.52 - 1.04 mg/dL    GFR Estimate >90 >60 mL/min/[1.73_m2]    GFR Estimate If Black >90 >60 mL/min/[1.73_m2]    Calcium 9.1 8.5 - 10.1 mg/dL    Bilirubin Total 8.3 (H) 0.2 - 1.3 mg/dL    Albumin 3.6 3.4 - 5.0 g/dL    Protein Total 7.2 6.8 - 8.8 g/dL    Alkaline Phosphatase 246 (H) 40 - 150 U/L    ALT 1,064 (HH) 0 - 50 U/L     (HH) 0 - 45 U/L   Lipase   Result Value Ref Range    Lipase 81 73 - 393 U/L   INR   Result Value Ref Range    INR 1.12 0.86 - 1.14   Asymptomatic COVID-19 Virus (Coronavirus) by PCR    Specimen: Nasopharyngeal   Result Value Ref Range    COVID-19 Virus PCR to [facility] - Source Nasopharyngeal     COVID-19 Virus PCR to [facility] - Result       Test received-See reflex to IDDL test SARS CoV2 (COVID-19) Virus RT-PCR   SARS-CoV-2 COVID-19 Virus (Coronavirus) RT-PCR Nasopharyngeal    Specimen: Nasopharyngeal   Result Value Ref Range    SARS-CoV-2 Virus Specimen Source Nasopharyngeal     SARS-CoV-2 PCR Result NEGATIVE     SARS-CoV-2 PCR Comment       Testing was performed using the Xpert Xpress SARS-CoV-2 Assay on the Cepheid Gene-Xpert   Instrument Systems. Additional information about this Emergency Use Authorization  (EUA)   assay can be found via the Lab Guide.       Medications   sodium chloride 0.9% infusion (has no administration in time range)         The pancreas last biliary fellow saw the patient in the emergency department.  The GI service is concerned about the biliary obstruction and possible cholangitis.  They will plan to take the patient straight to the operating room from the emergency department for ERCP.    Assessments & Plan (with Medical Decision Making)   Patient comes with abdominal pain, fever last night, elevated LFTs as well as bilirubin and alkaline phosphatase.  Endoscopic ultrasound showed common bile duct stone and sludge.  Patient likely has continued obstruction of the common bile duct and possibly cholangitis. She does not have a fever today, does not appear septic.  Will hold off on antibiotics for now unless she develops fever per discussion with GI fellow.  She will go to the OR for ERCP and decompression today.    I have reviewed the nursing notes.    I have reviewed the findings, diagnosis, plan and need for follow up with the patient.    New Prescriptions    No medications on file       Final diagnoses:   Choledocholithiasis     I, Javier Shine, am serving as a trained medical scribe to document services personally performed by Chaya Gurrola MD, based on the provider's statements to me.     I, Chaya Gurrola MD, was physically present and have reviewed and verified the accuracy of this note documented by Javier Shine.    8/26/2020   [facility], [place], EMERGENCY DEPARTMENT     Chaya Gurrola MD  08/26/20 7966

## 2020-08-26 NOTE — ED TRIAGE NOTES
Pt c/o abd pain. States that she had her gallbladder removed a month ago.  Now has elevated liver enzymes and stones in the gallbladder duct that was not removed. Pt is also requesting covid test.

## 2020-08-26 NOTE — ANESTHESIA PREPROCEDURE EVALUATION
Anesthesia Pre-Procedure Evaluation    Patient: Brie Brown   MRN:     [id number] Gender:   female   Age:    35 year old :      1985        Preoperative Diagnosis: Choledocholithiasis [K80.50]   Procedure(s):  ENDOSCOPIC RETROGRADE CHOLANGIOPANCREATOGRAPHY     HPI:  35 year old female, cholecystectomy 1 month ago, now with CBD stone and dilation and new onset jaundice, fever presenting for ERCP.  Per GI note, patient in ED and may discharge home after procedure or may be admitting overnight for observation depending on timing, patient's symptoms post-procedure, and intra-procedure findings.     LABS:  CBC:   Lab Results   Component Value Date    WBC 5.5 2020    WBC 8.8 2020    HGB 12.1 2020    HGB 12.6 2020    HCT 37.2 2020    HCT 37.6 2020     2020     2020     BMP:   Lab Results   Component Value Date     2020     2020    POTASSIUM 3.6 2020    POTASSIUM 3.9 2020    CHLORIDE 108 2020    CHLORIDE 107 2020    CO2 26 2020    CO2 25 2020    BUN 5 (L) 2020    BUN 13 2020    CR 0.64 2020    CR 0.66 2020    GLC 94 2020     (H) 2020     COAGS:   Lab Results   Component Value Date    INR 1.12 2020     POC:   Lab Results   Component Value Date    HCG Negative 2020     OTHER:   Lab Results   Component Value Date    CUCA 9.1 2020    ALBUMIN 3.6 2020    PROTTOTAL 7.2 2020    ALT 1,064 (HH) 2020     (HH) 2020    ALKPHOS 246 (H) 2020    BILITOTAL 8.3 (H) 2020    LIPASE 81 2020    TSH 1.32 2011        Preop Vitals    BP Readings from Last 3 Encounters:   20 107/66   20 103/75   20 116/73    Pulse Readings from Last 3 Encounters:   20 64   20 61   20 67      Resp Readings from Last 3 Encounters:   20 16   20 20   20 14    SpO2 Readings  "from Last 3 Encounters:   08/26/20 98%   08/24/20 99%   07/24/20 98%      Temp Readings from Last 1 Encounters:   08/26/20 36.9  C (98.4  F) (Oral)    Ht Readings from Last 1 Encounters:   08/26/20 1.727 m (5' 8\")      Wt Readings from Last 1 Encounters:   08/24/20 67.1 kg (148 lb)    Estimated body mass index is 22.5 kg/m  as calculated from the following:    Height as of this encounter: 1.727 m (5' 8\").    Weight as of 8/24/20: 67.1 kg (148 lb).     LDA:  Peripheral IV 08/26/20 Left Upper forearm (Active)   Number of days: 0       ETT (Active)   Number of days: 0        Past Medical History:   Diagnosis Date     Abnormal Pap smear 2009    normals since     Anemia 2016    during last pregnancy     Anxiety      LSIL (low grade squamous intraepithelial lesion) on Pap smear 02/02/09     NO ACTIVE PROBLEMS       Past Surgical History:   Procedure Laterality Date     ENT SURGERY      Tonsils and adenoids removed when very young     LAPAROSCOPIC CHOLECYSTECTOMY N/A 7/24/2020    Procedure: LAPAROSCOPIC CHOLECYSTECTOMY;  Surgeon: Brandyn Luz MD;  Location:  OR      Allergies   Allergen Reactions     Augmentin      High fever as a child     Seasonal Allergies         Anesthesia Evaluation     .             ROS/MED HX    ENT/Pulmonary:      (-) sleep apnea   Neurologic:  - neg neurologic ROS     Cardiovascular:  - neg cardiovascular ROS       METS/Exercise Tolerance:     Hematologic:  - neg hematologic  ROS       Musculoskeletal:  - neg musculoskeletal ROS       GI/Hepatic:     (+) cholecystitis/cholelithiasis,       Renal/Genitourinary:  - ROS Renal section negative       Endo:  - neg endo ROS       Psychiatric:     (+) psychiatric history anxiety and depression      Infectious Disease:  - neg infectious disease ROS       Malignancy:   (+)   LGSIL of cervix     - no malignancy   Other:                     JZG FV AN PHYSICAL EXAM    Assessment:   ASA SCORE: 2    H&P: History and physical reviewed and following " examination; no interval change.   Smoking Status:  Non-Smoker/Unknown        Plan:   Anes. Type:  General   Pre-Medication: None   Induction:  IV (Standard)   Airway: ETT; Oral   Access/Monitoring: PIV   Maintenance: Balanced     Postop Plan:   Postop Pain: Opioids  Postop Sedation/Airway: Not planned  Disposition: Outpatient     PONV Management:   Adult Risk Factors: Female, Non-Smoker, Postop Opioids   Prevention: Ondansetron, Dexamethasone                   Kade Pablo III, MD

## 2020-08-26 NOTE — PROGRESS NOTES
I was phoned by Dr Meier about this patient who serves on her staff. Essentially this patient sounds as if she has an obstructing common duct stone with jaundice and possible cholangitis. I asked that she present to our ED now and we will request a rapid COVID test and proceed with ERCP as early as today for urgent decompression.    Rajinder Garza MD PhD FACG LINDA YOUNG  Director of Endoscopy  Associate Professor of Medicine, Surgery and Pediatrics  Interventional and Therapeutic Endoscopy    [facility]  Division of Gastroenterology and Hepatology  [facility] [address] [place] [address]    New Consultations  [phone]  Procedure Scheduling [phone]  Clinical Nurse Coordinator [phone]  Clinical Fax   [phone]  Administrative   [phone]  Administrative Fax  [phone]

## 2020-08-26 NOTE — PROGRESS NOTES
Brief GI Note    I evaluated Ms. Brown in the ED this afternoon. She is a 35 year old female status post lap jerry about a month ago. Apparently had a mildly increased CBD diameter at that time (7mm on US dated 6/22), no intraop cholangiogram performed. Liver enzymes were normal prior to the operation. On Monday, developed sudden abdominal pain, presented to ED at [facility], CT C/A/P and abdominal ultrasound remarkable for mild hepatomegaly only. AST minimally elevated to 143, ALT 62, normal alk phos and bilirubin. She was set up with outpatient follow-up with Dr. Alfaro, who jeanmarie labs yetserday in clinic with total bili 4.5, alk phos 196, AST 1251, ALT 1089, normal lipase at 32. This morning, he performed EGD/EUS which visualized 5mm stone and sludge in the CBD, and dilation of the CBD to 7mm. No ERCP was performed, with plans to perform in a couple of days on an outpatient basis. Due to ongoing pain and reported fever, in addition to the onset of jaundice, she presented to the ED here. Labs here notable for bili of 8.3, alk phos of 246, ALT 1064, . Lipase 81. INR pending.    Plan:  After discussion with Dr. Garza, as well as the patient and her , we will proceed to urgent ERCP today in the OR, pending negative COVID testing, hopefully direct from the ED to the OR. Disposition (admit vs discharge to home) will be pending intraoperative findings. Please keep NPO and avoid anticoagulation in the meantime.    Rashi Lucio MD  Gastroenterology Fellow

## 2020-08-27 VITALS
OXYGEN SATURATION: 98 % | HEART RATE: 72 BPM | HEIGHT: 68 IN | TEMPERATURE: 99.4 F | BODY MASS INDEX: 22.5 KG/M2 | RESPIRATION RATE: 16 BRPM | SYSTOLIC BLOOD PRESSURE: 118 MMHG | DIASTOLIC BLOOD PRESSURE: 69 MMHG

## 2020-08-27 NOTE — OR NURSING
MD Garza paged at 1145: PACU Stephanie, L: Amylase 49, lipase 294. Slightly nauseated but tolerable. Thanks! Gina SANTIAGO [phone] or [phone]     Spoke with shy Alarcon to discharge home.

## 2020-08-27 NOTE — OR NURSING
Instructions reviewed with  More over the phone. Responsible adult verbalized understanding of discharge instructions. Paper copy of discharge instructions sent with patient.

## 2020-08-27 NOTE — ANESTHESIA POSTPROCEDURE EVALUATION
Anesthesia POST Procedure Evaluation    Patient: Brie Brown   MRN:     [id number] Gender:   female   Age:    35 year old :      1985        Preoperative Diagnosis: Choledocholithiasis [K80.50]   Procedure(s):  ENDOSCOPIC RETROGRADE CHOLANGIOPANCREATOGRAPHY, Biliary Shincterectomy, Biliary Stone Removal and Pancreatic Stent Placement   Postop Comments: No value filed.     Anesthesia Type: General       Disposition: Outpatient   Postop Pain Control: Uneventful            Sign Out: Well controlled pain   PONV: No   Neuro/Psych: Uneventful            Sign Out: Acceptable/Baseline neuro status   Airway/Respiratory: Uneventful            Sign Out: Acceptable/Baseline resp. status   CV/Hemodynamics: Uneventful            Sign Out: Acceptable CV status   Other NRE: NONE   DID A NON-ROUTINE EVENT OCCUR? No         Last Anesthesia Record Vitals:  CRNA VITALS  2020 - 20208      2020             Pulse:  124    SpO2:  100 %    Resp Rate (observed):  (!) 2          Last PACU Vitals:  Vitals Value Taken Time   /69 2020 11:30 PM   Temp 37.4  C (99.4  F) 2020 11:30 PM   Pulse 72 2020 11:30 PM   Resp 16 2020 11:30 PM   SpO2 98 % 2020 11:30 PM   Temp src     NIBP     Pulse     SpO2     Resp     Temp     Ht Rate     Temp 2           Electronically Signed By: Sharri Raygoza MD, 2020, 12:23 AM

## 2020-08-27 NOTE — ANESTHESIA CARE TRANSFER NOTE
Patient: Brie Brown    Procedure(s):  ENDOSCOPIC RETROGRADE CHOLANGIOPANCREATOGRAPHY, Biliary Shincterectomy, Biliary Stone Removal and Pancreatic Stent Placement    Diagnosis: Choledocholithiasis [K80.50]  Diagnosis Additional Information: No value filed.    Anesthesia Type:   General     Note:  Airway :Nasal Cannula  Patient transferred to:PACU  Comments: To PACU with CRNA, RN. Pt alert, appropriate, VSS on O2 per NC. Report and care to PACU RNHandoff Report: Identifed the Patient, Identified the Reponsible Provider, Reviewed the pertinent medical history, Discussed the surgical course, Reviewed Intra-OP anesthesia mangement and issues during anesthesia, Set expectations for post-procedure period and Allowed opportunity for questions and acknowledgement of understanding      Vitals: (Last set prior to Anesthesia Care Transfer)    CRNA VITALS  8/26/2020 2108 - 8/26/2020 2145 8/26/2020             Pulse:  124    SpO2:  100 %    Resp Rate (observed):  (!) 2                Electronically Signed By: SANCHEZ Knowles CRNA  August 26, 2020  9:45 PM

## 2020-08-27 NOTE — ANESTHESIA POSTPROCEDURE EVALUATION
Anesthesia POST Procedure Evaluation    Patient: Brie Brown   MRN:     [id number] Gender:   female   Age:    35 year old :      1985        Preoperative Diagnosis: Choledocholithiasis [K80.50]   Procedure(s):  ENDOSCOPIC RETROGRADE CHOLANGIOPANCREATOGRAPHY, Biliary Shincterectomy, Biliary Stone Removal and Pancreatic Stent Placement   Postop Comments: No value filed.     Anesthesia Type: General       Disposition: Admission   Postop Pain Control: Uneventful            Sign Out: Well controlled pain   PONV: No   Neuro/Psych: Uneventful            Sign Out: Acceptable/Baseline neuro status   Airway/Respiratory: Uneventful            Sign Out: Acceptable/Baseline resp. status   CV/Hemodynamics: Uneventful            Sign Out: Acceptable CV status   Other NRE: NONE   DID A NON-ROUTINE EVENT OCCUR? No         Last Anesthesia Record Vitals:  CRNA VITALS  2020 - 2020             Pulse:  124    SpO2:  100 %    Resp Rate (observed):  (!) 2          Last PACU Vitals:  Vitals Value Taken Time   /76 2020 10:45 PM   Temp 36.8  C (98.2  F) 2020 10:15 PM   Pulse 67 2020 10:46 PM   Resp 16 2020 10:00 PM   SpO2 98 % 2020 10:46 PM   Temp src     NIBP     Pulse     SpO2     Resp     Temp     Ht Rate     Temp 2     Vitals shown include unvalidated device data.      Electronically Signed By: Sharri Raygoza MD, 2020, 10:47 PM

## 2020-08-31 LAB — ERCP: NORMAL

## 2020-09-03 ENCOUNTER — PATIENT OUTREACH (OUTPATIENT)
Dept: GASTROENTEROLOGY | Facility: CLINIC | Age: 35
End: 2020-09-03

## 2020-09-03 DIAGNOSIS — Z46.89 ENCOUNTER FOR REMOVAL OF PANCREATIC STENT: Primary | ICD-10-CM

## 2020-09-03 NOTE — TELEPHONE ENCOUNTER
Post ERCP (8/26/2020) with Dr. Garza: Follow-up    Post procedure recommendations:   Plain film in 4w to ensure spontaneous ejection of the                        pancreatic stent                        - No plans for repeat endoscopy at this juncture     Orders placed: abd xray, due 9/23    Patient states : left message    Clinic contact and scheduling numbers verified for future questions/concerns.    Dee Coates, RN Care Coordinator

## 2020-09-29 ENCOUNTER — PATIENT OUTREACH (OUTPATIENT)
Dept: GASTROENTEROLOGY | Facility: CLINIC | Age: 35
End: 2020-09-29

## 2020-09-29 NOTE — TELEPHONE ENCOUNTER
Called patient to remind for need for xray for stent. Left message and MyChart sent.    Dee Coates RN Care Coordinator

## 2020-10-08 ENCOUNTER — ANCILLARY PROCEDURE (OUTPATIENT)
Dept: GENERAL RADIOLOGY | Facility: CLINIC | Age: 35
End: 2020-10-08
Attending: INTERNAL MEDICINE
Payer: COMMERCIAL

## 2020-10-08 DIAGNOSIS — Z46.89 ENCOUNTER FOR REMOVAL OF PANCREATIC STENT: ICD-10-CM

## 2020-10-08 PROCEDURE — 74019 RADEX ABDOMEN 2 VIEWS: CPT | Performed by: STUDENT IN AN ORGANIZED HEALTH CARE EDUCATION/TRAINING PROGRAM

## 2020-10-26 ENCOUNTER — MYC REFILL (OUTPATIENT)
Dept: OBGYN | Facility: CLINIC | Age: 35
End: 2020-10-26

## 2020-10-26 DIAGNOSIS — F41.8 POSTPARTUM ANXIETY: ICD-10-CM

## 2020-10-28 NOTE — TELEPHONE ENCOUNTER
Refill sertraline requested. Pt last seen in clinic in Sept 2019. Will send short term supply and MyChart to patient need appt for further refills

## 2020-12-06 ENCOUNTER — HEALTH MAINTENANCE LETTER (OUTPATIENT)
Age: 35
End: 2020-12-06

## 2021-01-07 ENCOUNTER — VIRTUAL VISIT (OUTPATIENT)
Dept: FAMILY MEDICINE | Facility: CLINIC | Age: 36
End: 2021-01-07
Payer: COMMERCIAL

## 2021-01-07 DIAGNOSIS — J02.9 SORE THROAT: Primary | ICD-10-CM

## 2021-01-07 DIAGNOSIS — J02.9 SORE THROAT: ICD-10-CM

## 2021-01-07 DIAGNOSIS — R50.9 FEVER, UNSPECIFIED FEVER CAUSE: ICD-10-CM

## 2021-01-07 DIAGNOSIS — R51.9 NONINTRACTABLE HEADACHE, UNSPECIFIED CHRONICITY PATTERN, UNSPECIFIED HEADACHE TYPE: ICD-10-CM

## 2021-01-07 DIAGNOSIS — J02.0 STREPTOCOCCAL PHARYNGITIS: Primary | ICD-10-CM

## 2021-01-07 LAB
DEPRECATED S PYO AG THROAT QL EIA: POSITIVE
SARS-COV-2 RNA RESP QL NAA+PROBE: NOT DETECTED
SPECIMEN SOURCE: ABNORMAL
SPECIMEN SOURCE: NORMAL

## 2021-01-07 PROCEDURE — 87880 STREP A ASSAY W/OPTIC: CPT | Performed by: FAMILY MEDICINE

## 2021-01-07 PROCEDURE — U0005 INFEC AGEN DETEC AMPLI PROBE: HCPCS | Performed by: FAMILY MEDICINE

## 2021-01-07 PROCEDURE — U0003 INFECTIOUS AGENT DETECTION BY NUCLEIC ACID (DNA OR RNA); SEVERE ACUTE RESPIRATORY SYNDROME CORONAVIRUS 2 (SARS-COV-2) (CORONAVIRUS DISEASE [COVID-19]), AMPLIFIED PROBE TECHNIQUE, MAKING USE OF HIGH THROUGHPUT TECHNOLOGIES AS DESCRIBED BY CMS-2020-01-R: HCPCS | Performed by: FAMILY MEDICINE

## 2021-01-07 PROCEDURE — 99214 OFFICE O/P EST MOD 30 MIN: CPT | Mod: 95 | Performed by: FAMILY MEDICINE

## 2021-01-07 RX ORDER — PENICILLIN V POTASSIUM 500 MG/1
500 TABLET, FILM COATED ORAL 3 TIMES DAILY
Qty: 30 TABLET | Refills: 0 | Status: SHIPPED | OUTPATIENT
Start: 2021-01-07 | End: 2021-01-17

## 2021-01-07 NOTE — PROGRESS NOTES
Mom positive for strep. She says only possible reaction to augment. Has had no difficulty with amoxicillin. Treat with penicillin.     Cory Pratt PA-C

## 2021-01-07 NOTE — PROGRESS NOTES
Brie Brown is a 35 year old female who is being evaluated via a billable video visit.      How would you like to obtain your AVS? MyChart  If the video visit is dropped, the invitation should be resent by: Text to cell phone: PLEASE USE [employer] [phone]  Will anyone else be joining your video visit? No      Video Start Time: 8: 18  Assessment & Plan     Sore throat  - Rapid strep group A screen POCT; Future  - Symptomatic COVID-19 Virus (Coronavirus) by PCR; Future    Fever, unspecified fever cause    Nonintractable headache, unspecified chronicity pattern, unspecified headache type      I recommended she undergo strep testing and Covid testing.  These orders were placed.  In the meantime she may continue symptomatic cares with rest, hydration and Tylenol/ibuprofen as needed.  If she develops any signs or symptoms of acute respiratory distress she will seek medical attention right away.          No follow-ups on file.    Law Sethi, [facility]     Brie Brown is a 35 year old who presents to clinic today for the following health issues     HPI       Acute Illness  Acute illness concerns: SORE THROAT/FEVER  Onset/Duration: 3 DAYS  Symptoms:  Fever: YES- 102.5  Chills/Sweats: YES  Headache (location?): YES  Sinus Pressure: no  Conjunctivitis:  no  Ear Pain: no  Rhinorrhea: no  Congestion: no  Sore Throat: YES  Cough: no  Wheeze: no  Decreased Appetite: YES  Nausea: no  Vomiting: no  Diarrhea: no  Dysuria/Freq.: no  Dysuria or Hematuria: no  Fatigue/Achiness: YES  Sick/Strep Exposure: Saliva COVID TESTING YESTERDAY MORNING RESULTS PENDING  Therapies tried and outcome: IBUPROFEN       He describes having sore throat symptoms with headaches and fevers over the past couple of days.  She had a saliva Covid test yesterday and the results are pending.  Her 3-year-old son is ill with similar symptoms.  He is not short of breath.  She is not coughing.  She denies any  underlying lung problems.    Review of Systems         Objective           Vitals:  No vitals were obtained today due to virtual visit.    Physical Exam   GENERAL: Healthy, alert and no distress  EYES: Eyes grossly normal to inspection.  No discharge or erythema, or obvious scleral/conjunctival abnormalities.  RESP: No audible wheeze, cough, or visible cyanosis.  No visible retractions or increased work of breathing.    SKIN: Visible skin clear. No significant rash, abnormal pigmentation or lesions.  NEURO: Cranial nerves grossly intact.  Mentation and speech appropriate for age.  PSYCH: Mentation appears normal, affect normal/bright, judgement and insight intact, normal speech and appearance well-groomed.                Video-Visit Details    Type of service:  Video Visit    Video End Time:8: 28    Originating Location (pt. Location): Home    Distant Location (provider location):  [facility]     Platform used for Video Visit: [employer]

## 2021-02-04 ENCOUNTER — OFFICE VISIT (OUTPATIENT)
Dept: FAMILY MEDICINE | Facility: CLINIC | Age: 36
End: 2021-02-04
Payer: COMMERCIAL

## 2021-02-04 VITALS
HEIGHT: 68 IN | OXYGEN SATURATION: 100 % | HEART RATE: 72 BPM | SYSTOLIC BLOOD PRESSURE: 107 MMHG | BODY MASS INDEX: 22.73 KG/M2 | TEMPERATURE: 97.8 F | RESPIRATION RATE: 16 BRPM | DIASTOLIC BLOOD PRESSURE: 72 MMHG | WEIGHT: 150 LBS

## 2021-02-04 DIAGNOSIS — F41.8 POSTPARTUM ANXIETY: ICD-10-CM

## 2021-02-04 DIAGNOSIS — Z00.00 ROUTINE GENERAL MEDICAL EXAMINATION AT A HEALTH CARE FACILITY: Primary | ICD-10-CM

## 2021-02-04 PROCEDURE — 99395 PREV VISIT EST AGE 18-39: CPT | Performed by: PHYSICIAN ASSISTANT

## 2021-02-04 ASSESSMENT — PATIENT HEALTH QUESTIONNAIRE - PHQ9
5. POOR APPETITE OR OVEREATING: NOT AT ALL
SUM OF ALL RESPONSES TO PHQ QUESTIONS 1-9: 1

## 2021-02-04 ASSESSMENT — ANXIETY QUESTIONNAIRES
6. BECOMING EASILY ANNOYED OR IRRITABLE: SEVERAL DAYS
1. FEELING NERVOUS, ANXIOUS, OR ON EDGE: SEVERAL DAYS
5. BEING SO RESTLESS THAT IT IS HARD TO SIT STILL: NOT AT ALL
3. WORRYING TOO MUCH ABOUT DIFFERENT THINGS: NOT AT ALL
2. NOT BEING ABLE TO STOP OR CONTROL WORRYING: NOT AT ALL
GAD7 TOTAL SCORE: 2
IF YOU CHECKED OFF ANY PROBLEMS ON THIS QUESTIONNAIRE, HOW DIFFICULT HAVE THESE PROBLEMS MADE IT FOR YOU TO DO YOUR WORK, TAKE CARE OF THINGS AT HOME, OR GET ALONG WITH OTHER PEOPLE: NOT DIFFICULT AT ALL
7. FEELING AFRAID AS IF SOMETHING AWFUL MIGHT HAPPEN: NOT AT ALL

## 2021-02-04 ASSESSMENT — MIFFLIN-ST. JEOR: SCORE: 1433.9

## 2021-02-04 NOTE — PROGRESS NOTES
SUBJECTIVE:   CC: Brie Brown is an 35 year old woman who presents for preventive health visit.       Patient has been advised of split billing requirements and indicates understanding: Yes  Healthy Habits:     Getting at least 3 servings of Calcium per day:  Yes    Bi-annual eye exam:  Yes    Dental care twice a year:  Yes    Sleep apnea or symptoms of sleep apnea:  None    Diet:  Regular (no restrictions)    Frequency of exercise:  2-3 days/week    Duration of exercise:  15-30 minutes    Taking medications regularly:  Yes    Medication side effects:  None    PHQ-2 Total Score: 1    Additional concerns today:  No    Patient working with therapist and doing well on daily medicine at this time.      Today's PHQ-2 Score:   PHQ-2 ( 1999 Pfizer) 2/4/2021   Q1: Little interest or pleasure in doing things 0   Q2: Feeling down, depressed or hopeless 1   PHQ-2 Score 1   Q1: Little interest or pleasure in doing things Not at all   Q2: Feeling down, depressed or hopeless Several days   PHQ-2 Score 1       Abuse: Current or Past (Physical, Sexual or Emotional) - No  Do you feel safe in your environment? Yes        Social History     Tobacco Use     Smoking status: Never Smoker     Smokeless tobacco: Never Used   Substance Use Topics     Alcohol use: Yes     Alcohol/week: 0.0 standard drinks     Comment: 5/week     If you drink alcohol do you typically have >3 drinks per day or >7 drinks per week? No    Alcohol Use 2/4/2021   Prescreen: >3 drinks/day or >7 drinks/week? No   Prescreen: >3 drinks/day or >7 drinks/week? -   No flowsheet data found.      Reviewed orders with patient.  Reviewed health maintenance and updated orders accordingly - Yes  Labs reviewed in EPIC  BP Readings from Last 3 Encounters:   02/04/21 107/72   08/26/20 118/69   08/24/20 103/75    Wt Readings from Last 3 Encounters:   02/04/21 68 kg (150 lb)   08/24/20 67.1 kg (148 lb)   07/24/20 67.9 kg (149 lb 12.8 oz)                  Patient Active Problem  List   Diagnosis     Papanicolaou smear of cervix with low grade squamous intraepithelial lesion (LGSIL)     Postpartum anxiety     Postpartum depression     Cholelithiasis     Choledocholithiasis     Past Surgical History:   Procedure Laterality Date     ENDOSCOPIC RETROGRADE CHOLANGIOPANCREATOGRAM N/A 8/26/2020    Procedure: ENDOSCOPIC RETROGRADE CHOLANGIOPANCREATOGRAPHY, Biliary Shincterectomy, Biliary Stone Removal and Pancreatic Stent Placement;  Surgeon: Rajinder Garza MD;  Location: [facility] OR     ENT SURGERY      Tonsils and adenoids removed when very young     LAPAROSCOPIC CHOLECYSTECTOMY N/A 7/24/2020    Procedure: LAPAROSCOPIC CHOLECYSTECTOMY;  Surgeon: Brandyn Luz MD;  Location:  OR       Social History     Tobacco Use     Smoking status: Never Smoker     Smokeless tobacco: Never Used   Substance Use Topics     Alcohol use: Yes     Alcohol/week: 0.0 standard drinks     Comment: 5/week     Family History   Problem Relation Age of Onset     Lipids Mother      Hypertension Father      Depression Father      Lipids Father      Heart Disease Paternal Grandfather         passsed away from MI     Prostate Cancer Paternal Grandfather      Cerebrovascular Disease Paternal Grandmother      Alcohol/Drug Paternal Grandmother      Arthritis Maternal Grandmother      Depression Sister          Current Outpatient Medications   Medication Sig Dispense Refill     Prenatal Vit-Fe Fumarate-FA (PRENATAL 19) CHEW        senna-docusate (SENOKOT-S/PERICOLACE) 8.6-50 MG tablet Take 1-2 tablets by mouth 2 times daily as needed for constipation 20 tablet 0     sertraline (ZOLOFT) 50 MG tablet Take 1 tablet (50 mg) by mouth daily 90 tablet 3     Allergies   Allergen Reactions     Augmentin      High fever as a child     Seasonal Allergies      Recent Labs   Lab Test 08/26/20  1353 08/24/20  1254 07/23/20  1043   ALT 1,064* 62* 21   CR 0.64 0.66 0.72   GFRESTIMATED >90 >90 >90   GFRESTBLACK >90 >90 >90   POTASSIUM  "3.6 3.9 4.1        Breast CA Risk Screening:  Breast CA Risk Assessment (FHS-7) 2/4/2021   Do you have a family history of breast, colon, or ovarian cancer? No / Unknown       Mammogram Screening - Mammography discussed, not appropriate due to age  Pertinent mammograms are reviewed under the imaging tab.    History of abnormal Pap smear: NO - age 30-65 PAP every 5 years with negative HPV co-testing recommended  PAP / HPV Latest Ref Rng & Units 4/24/2019 6/14/2012 4/13/2011   PAP - NIL NIL NIL   HPV 16 DNA NEG:Negative Negative - -   HPV 18 DNA NEG:Negative Negative - -   OTHER HR HPV NEG:Negative Negative - -     Reviewed and updated as needed this visit by clinical staff  Tobacco  Allergies  Meds  Problems  Med Hx  Surg Hx  Fam Hx          Reviewed and updated as needed this visit by Provider                    Review of Systems  CONSTITUTIONAL: NEGATIVE for fever, chills, change in weight  INTEGUMENTARU/SKIN: NEGATIVE for worrisome rashes, moles or lesions  EYES: NEGATIVE for vision changes or irritation  ENT: NEGATIVE for ear, mouth and throat problems  RESP: NEGATIVE for significant cough or SOB  BREAST: NEGATIVE for masses, tenderness or discharge  CV: NEGATIVE for chest pain, palpitations or peripheral edema  GI: NEGATIVE for nausea, abdominal pain, heartburn, or change in bowel habits  : NEGATIVE for unusual urinary or vaginal symptoms. Periods are regular.  MUSCULOSKELETAL: NEGATIVE for significant arthralgias or myalgia  NEURO: NEGATIVE for weakness, dizziness or paresthesias  PSYCHIATRIC: NEGATIVE for changes in mood or affect     OBJECTIVE:   /72   Pulse 72   Temp 97.8  F (36.6  C) (Tympanic)   Resp 16   Ht 1.727 m (5' 8\")   Wt 68 kg (150 lb)   SpO2 100%   BMI 22.81 kg/m    Physical Exam  GENERAL: healthy, alert and no distress  EYES: Eyes grossly normal to inspection, PERRL and conjunctivae and sclerae normal  HENT: ear canals and TM's normal, nose and mouth without ulcers or " "lesions  NECK: no adenopathy, no asymmetry, masses, or scars and thyroid normal to palpation  RESP: lungs clear to auscultation - no rales, rhonchi or wheezes  BREAST: normal without masses, tenderness or nipple discharge and no palpable axillary masses or adenopathy  CV: regular rate and rhythm, normal S1 S2, no S3 or S4, no murmur, click or rub, no peripheral edema and peripheral pulses strong  ABDOMEN: soft, nontender, no hepatosplenomegaly, no masses and bowel sounds normal  MS: no gross musculoskeletal defects noted, no edema  SKIN: no suspicious lesions or rashes  NEURO: Normal strength and tone, mentation intact and speech normal  PSYCH: mentation appears normal, affect normal/bright    Diagnostic Test Results:  Labs reviewed in Epic    ASSESSMENT/PLAN:       ICD-10-CM    1. Routine general medical examination at a health care facility  Z00.00    2. Postpartum anxiety  O99.345 sertraline (ZOLOFT) 50 MG tablet    F41.8        Patient has been advised of split billing requirements and indicates understanding: Yes  COUNSELING:  Reviewed preventive health counseling, as reflected in patient instructions       Regular exercise       Healthy diet/nutrition    Estimated body mass index is 22.81 kg/m  as calculated from the following:    Height as of this encounter: 1.727 m (5' 8\").    Weight as of this encounter: 68 kg (150 lb).        She reports that she has never smoked. She has never used smokeless tobacco.      Counseling Resources:  ATP IV Guidelines  Pooled Cohorts Equation Calculator  Breast Cancer Risk Calculator  BRCA-Related Cancer Risk Assessment: FHS-7 Tool  FRAX Risk Assessment  ICSI Preventive Guidelines  Dietary Guidelines for Americans, 2010  USDA's MyPlate  ASA Prophylaxis  Lung CA Screening    Dalia Renteria PA-C  [facility]  "

## 2021-02-05 ASSESSMENT — ANXIETY QUESTIONNAIRES: GAD7 TOTAL SCORE: 2

## 2021-02-12 ENCOUNTER — IMMUNIZATION (OUTPATIENT)
Dept: PEDIATRICS | Facility: CLINIC | Age: 36
End: 2021-02-12
Payer: COMMERCIAL

## 2021-02-12 PROCEDURE — 0001A PR COVID VAC PFIZER DIL RECON 30 MCG/0.3 ML IM: CPT

## 2021-02-12 PROCEDURE — 91300 PR COVID VAC PFIZER DIL RECON 30 MCG/0.3 ML IM: CPT

## 2021-03-09 ENCOUNTER — IMMUNIZATION (OUTPATIENT)
Dept: PEDIATRICS | Facility: CLINIC | Age: 36
End: 2021-03-09
Attending: INTERNAL MEDICINE
Payer: COMMERCIAL

## 2021-03-09 PROCEDURE — 91300 PR COVID VAC PFIZER DIL RECON 30 MCG/0.3 ML IM: CPT

## 2021-03-09 PROCEDURE — 0002A PR COVID VAC PFIZER DIL RECON 30 MCG/0.3 ML IM: CPT

## 2021-08-11 NOTE — PROGRESS NOTES
Blood pressure 121/64, temperature 99  F (37.2  C), temperature source Oral, resp. rate 18, last menstrual period 06/15/2018, not currently breastfeeding.  Patient Vitals for the past 24 hrs:   BP Temp Temp src Resp   19 1430 121/64 -- -- 18   19 1427 -- 99  F (37.2  C) Oral --     General appearance: comfortable  Still cramping and contr. Feels they are not stronger. No bleeding baby active.  CONTACTIONS: mild, cramping and every 3 minutes  FETAL HEART TONES: off for ambulation  ROM: not ruptured  PELVIC EXAM:changed to 4/50/0 station.  # Pain Assessment:  Current Pain Score 2019   Patient currently in pain? yes   - Brie is experiencing pain due to PTL. Pain management was discussed and the plan was created in a collaborative fashion.  Brie's response to the current recommendations: engaged  - supportive measures      ASSESSMENT:  ==============  IUP @ 36w0d  labor contr   Cervical change  Fetal Heart Rate Tracing category one  GBS- pending  Patient Active Problem List   Diagnosis     Papanicolaou smear of cervix with low grade squamous intraepithelial lesion (LGSIL)     Supervision of other normal pregnancy - S CNM     Iron deficiency anemia due to chronic blood loss      contractions     PLAN:  ===========  type and screen and Anti-Trep added to labs  Plan observation overnight- continue hydration.  Plan Betamethasone for late   Consult with Dr Downey. Agreeable to plan   Pt agreeable to plan  SANCHEZ George CNM     Detail Level: Zone

## 2021-09-25 ENCOUNTER — HEALTH MAINTENANCE LETTER (OUTPATIENT)
Age: 36
End: 2021-09-25

## 2021-11-04 ENCOUNTER — TRANSFERRED RECORDS (OUTPATIENT)
Dept: HEALTH INFORMATION MANAGEMENT | Facility: CLINIC | Age: 36
End: 2021-11-04
Payer: COMMERCIAL

## 2022-03-12 ENCOUNTER — HEALTH MAINTENANCE LETTER (OUTPATIENT)
Age: 37
End: 2022-03-12

## 2022-04-11 DIAGNOSIS — F41.8 POSTPARTUM ANXIETY: ICD-10-CM

## 2022-04-11 NOTE — TELEPHONE ENCOUNTER
Prescription approved per [employer] Refill Protocol.  1 month refill only; patient due for appointment (physical)  Yoselin GONZALEZ RN

## 2022-05-18 DIAGNOSIS — F41.8 POSTPARTUM ANXIETY: ICD-10-CM

## 2022-05-18 NOTE — TELEPHONE ENCOUNTER
Left non detailed VM for pt asking that they callback and schedule physical  Yoselin GONZALEZ RN

## 2022-05-23 NOTE — TELEPHONE ENCOUNTER
Patient called and is [facility]  Care Unit Coordinator

## 2022-05-23 NOTE — TELEPHONE ENCOUNTER
MP,  Left  #2 for patient  See below messages  No response from patient to our outreach  Please advise on further refill  Thanks,  Yoselin GONZALEZ RN

## 2022-06-20 DIAGNOSIS — F41.8 POSTPARTUM ANXIETY: ICD-10-CM

## 2022-06-21 NOTE — TELEPHONE ENCOUNTER
One month supply sent 5/23/22.  Needs PE with PCP for further refills    Last seen 2/4/21  Will be addressed at 6/28 OV.  Maggie Francis RN

## 2022-06-25 NOTE — PROGRESS NOTES
SUBJECTIVE:   CC: Brie Brown is an 37 year old woman who presents for preventive health visit.     Patient has been advised of split billing requirements and indicates understanding: Yes  Healthy Habits:     Getting at least 3 servings of Calcium per day:  Yes    Bi-annual eye exam:  Yes    Dental care twice a year:  Yes    Sleep apnea or symptoms of sleep apnea:  None    Diet:  Regular (no restrictions)    Frequency of exercise:  2-3 days/week    Duration of exercise:  15-30 minutes    Taking medications regularly:  Yes    PHQ-2 Total Score: 0    Additional concerns today:  No    Wondering about retinA.  Wondering about bunions and next steps  Would like fasting blodowork done with lab only appt    Today's PHQ-2 Score:   PHQ-2 ( 1999 Pfizer) 6/27/2022   Q1: Little interest or pleasure in doing things 0   Q2: Feeling down, depressed or hopeless 0   PHQ-2 Score 0   PHQ-2 Total Score (12-17 Years)- Positive if 3 or more points; Administer PHQ-A if positive -   Q1: Little interest or pleasure in doing things Not at all   Q2: Feeling down, depressed or hopeless Not at all   PHQ-2 Score 0       Abuse: Current or Past (Physical, Sexual or Emotional) - No  Do you feel safe in your environment? Yes    Have you ever done Advance Care Planning? (For example, a Health Directive, POLST, or a discussion with a medical provider or your loved ones about your wishes): No, advance care planning information given to patient to review.  Patient declined advance care planning discussion at this time.    Social History     Tobacco Use     Smoking status: Never Smoker     Smokeless tobacco: Never Used   Substance Use Topics     Alcohol use: Yes     Comment: 5/week       Alcohol Use 6/27/2022   Prescreen: >3 drinks/day or >7 drinks/week? No   Prescreen: >3 drinks/day or >7 drinks/week? -     Reviewed orders with patient.  Reviewed health maintenance and updated orders accordingly - Yes  Lab work is in process  Labs reviewed in  EPIC  BP Readings from Last 3 Encounters:   06/28/22 112/74   02/04/21 107/72   08/26/20 118/69    Wt Readings from Last 3 Encounters:   06/28/22 70.3 kg (154 lb 14.4 oz)   02/04/21 68 kg (150 lb)   08/24/20 67.1 kg (148 lb)                  Patient Active Problem List   Diagnosis     Papanicolaou smear of cervix with low grade squamous intraepithelial lesion (LGSIL)     Postpartum anxiety     Postpartum depression     Cholelithiasis     Choledocholithiasis     Past Surgical History:   Procedure Laterality Date     ABDOMEN SURGERY  July 2020    Gallbladder     ENDOSCOPIC RETROGRADE CHOLANGIOPANCREATOGRAM N/A 08/26/2020    Procedure: ENDOSCOPIC RETROGRADE CHOLANGIOPANCREATOGRAPHY, Biliary Shincterectomy, Biliary Stone Removal and Pancreatic Stent Placement;  Surgeon: Rajinder Garza MD;  Location: [facility] OR     ENT SURGERY      Tonsils and adenoids removed when very young     LAPAROSCOPIC CHOLECYSTECTOMY N/A 07/24/2020    Procedure: LAPAROSCOPIC CHOLECYSTECTOMY;  Surgeon: Brandyn Luz MD;  Location:  OR       Social History     Tobacco Use     Smoking status: Never Smoker     Smokeless tobacco: Never Used   Substance Use Topics     Alcohol use: Yes     Comment: 5/week     Family History   Problem Relation Age of Onset     Lipids Mother      Hyperlipidemia Mother      Hypertension Father      Depression Father      Lipids Father      Hyperlipidemia Father      Heart Disease Paternal Grandfather         passsed away from MI     Prostate Cancer Paternal Grandfather      Cerebrovascular Disease Paternal Grandmother      Alcohol/Drug Paternal Grandmother      Arthritis Maternal Grandmother      Depression Sister          Current Outpatient Medications   Medication Sig Dispense Refill     sertraline (ZOLOFT) 50 MG tablet TAKE 1 TABLET(50 MG) BY MOUTH IN THE MORNING 90 tablet 1     tretinoin (RETIN-A) 0.025 % external gel Apply topically At Bedtime 15 g 1     Allergies   Allergen Reactions     Augmentin       High fever as a child     Seasonal Allergies      Recent Labs   Lab Test 08/26/20  1353 08/24/20  1254 07/23/20  1043   ALT 1,064* 62* 21   CR 0.64 0.66 0.72   GFRESTIMATED >90 >90 >90   GFRESTBLACK >90 >90 >90   POTASSIUM 3.6 3.9 4.1        Breast Cancer Screening:    Breast CA Risk Assessment (FHS-7) 2/4/2021 2/4/2021   Do you have a family history of breast, colon, or ovarian cancer? No / Unknown No / Unknown       Patient under 40 years of age: Routine Mammogram Screening not recommended.   Pertinent mammograms are reviewed under the imaging tab.    History of abnormal Pap smear: NO - age 30-65 PAP every 5 years with negative HPV co-testing recommended  PAP / HPV Latest Ref Rng & Units 4/24/2019 6/14/2012 4/13/2011   PAP (Historical) - NIL NIL NIL   HPV16 NEG:Negative Negative - -   HPV18 NEG:Negative Negative - -   HRHPV NEG:Negative Negative - -     Reviewed and updated as needed this visit by clinical staff   Tobacco  Allergies  Meds  Problems  Med Hx  Surg Hx  Fam Hx  Soc   Hx          Reviewed and updated as needed this visit by Provider   Tobacco  Allergies  Meds  Problems  Med Hx  Surg Hx  Fam Hx             Past Medical History:   Diagnosis Date     Abnormal Pap smear 2009    normals since     Anemia 2016    during last pregnancy     Anxiety      LSIL (low grade squamous intraepithelial lesion) on Pap smear 02/02/2009     NO ACTIVE PROBLEMS       Past Surgical History:   Procedure Laterality Date     ABDOMEN SURGERY  July 2020    Gallbladder     ENDOSCOPIC RETROGRADE CHOLANGIOPANCREATOGRAM N/A 08/26/2020    Procedure: ENDOSCOPIC RETROGRADE CHOLANGIOPANCREATOGRAPHY, Biliary Shincterectomy, Biliary Stone Removal and Pancreatic Stent Placement;  Surgeon: Rajinder Garza MD;  Location: [facility] OR     ENT SURGERY      Tonsils and adenoids removed when very young     LAPAROSCOPIC CHOLECYSTECTOMY N/A 07/24/2020    Procedure: LAPAROSCOPIC CHOLECYSTECTOMY;  Surgeon: Brandyn Luz MD;   "Location: [facility] OR       Review of Systems   Constitutional: Negative for chills and fever.   HENT: Negative for congestion, ear pain, hearing loss and sore throat.    Eyes: Negative for pain and visual disturbance.   Respiratory: Negative for cough and shortness of breath.    Cardiovascular: Negative for chest pain, palpitations and peripheral edema.   Gastrointestinal: Negative for abdominal pain, constipation, diarrhea, heartburn, hematochezia and nausea.   Breasts:  Negative for tenderness, breast mass and discharge.   Genitourinary: Negative for dysuria, frequency, genital sores, hematuria, pelvic pain, urgency, vaginal bleeding and vaginal discharge.   Musculoskeletal: Negative for arthralgias, joint swelling and myalgias.   Skin: Negative for rash.   Neurological: Negative for dizziness, weakness, headaches and paresthesias.   Psychiatric/Behavioral: Negative for mood changes. The patient is not nervous/anxious.         OBJECTIVE:   /74   Pulse 71   Temp 97.5  F (36.4  C) (Temporal)   Resp 16   Ht 1.727 m (5' 8\")   Wt 70.3 kg (154 lb 14.4 oz)   LMP 06/10/2022 (Approximate)   SpO2 100%   BMI 23.55 kg/m    Physical Exam  GENERAL: healthy, alert and no distress  EYES: Eyes grossly normal to inspection, PERRL and conjunctivae and sclerae normal  HENT: ear canals and TM's normal, nose and mouth without ulcers or lesions  NECK: no adenopathy, no asymmetry, masses, or scars and thyroid normal to palpation  RESP: lungs clear to auscultation - no rales, rhonchi or wheezes  BREAST: normal without masses, tenderness or nipple discharge and no palpable axillary masses or adenopathy  CV: regular rate and rhythm, normal S1 S2, no S3 or S4, no murmur, click or rub, no peripheral edema and peripheral pulses strong  ABDOMEN: soft, nontender, no hepatosplenomegaly, no masses and bowel sounds normal  MS: no gross musculoskeletal defects noted, no edema  SKIN: no suspicious lesions or rashes  NEURO: Normal strength " "and tone, mentation intact and speech normal  PSYCH: mentation appears normal, affect normal/bright    Diagnostic Test Results:  Labs reviewed in Epic    ASSESSMENT/PLAN:       ICD-10-CM    1. Routine general medical examination at a health care facility  Z00.00 D dimer, quantitative   2. Postpartum anxiety  O99.345 sertraline (ZOLOFT) 50 MG tablet    F41.8 Comprehensive metabolic panel   3. Bunion, right  M21.611 Orthopedic  Referral   4. Skin change  R23.9 tretinoin (RETIN-A) 0.025 % external gel   5. Screening, anemia, deficiency, iron  Z13.0 Hemoglobin   6. CARDIOVASCULAR SCREENING; LDL GOAL LESS THAN 160  Z13.6 Lipid panel reflex to direct LDL Fasting       Patient has been advised of split billing requirements and indicates understanding: Yes    COUNSELING:  Reviewed preventive health counseling, as reflected in patient instructions       Regular exercise       Healthy diet/nutrition    Estimated body mass index is 23.55 kg/m  as calculated from the following:    Height as of this encounter: 1.727 m (5' 8\").    Weight as of this encounter: 70.3 kg (154 lb 14.4 oz).        She reports that she has never smoked. She has never used smokeless tobacco.      Counseling Resources:  ATP IV Guidelines  Pooled Cohorts Equation Calculator  Breast Cancer Risk Calculator  BRCA-Related Cancer Risk Assessment: FHS-7 Tool  FRAX Risk Assessment  ICSI Preventive Guidelines  Dietary Guidelines for Americans, 2010  USDA's MyPlate  ASA Prophylaxis  Lung CA Screening    [facility]  "

## 2022-06-25 NOTE — PATIENT INSTRUCTIONS
Please make a lab only appointment prior to returning to clinic for fasting bloodwork (nothing to eat or drink x 6-8 hours) - labs ordered today.      Preventive Health Recommendations  Female Ages 26 - 39  Yearly exam:   See your health care provider every year in order to  Review health changes.   Discuss preventive care.    Review your medicines if you your doctor has prescribed any.    Until age 30: Get a Pap test every three years (more often if you have had an abnormal result).    After age 30: Talk to your doctor about whether you should have a Pap test every 3 years or have a Pap test with HPV screening every 5 years.   You do not need a Pap test if your uterus was removed (hysterectomy) and you have not had cancer.  You should be tested each year for STDs (sexually transmitted diseases), if you're at risk.   Talk to your provider about how often to have your cholesterol checked.  If you are at risk for diabetes, you should have a diabetes test (fasting glucose).  Shots: Get a flu shot each year. Get a tetanus shot every 10 years.   Nutrition:   Eat at least 5 servings of fruits and vegetables each day.  Eat whole-grain bread, whole-wheat pasta and brown rice instead of white grains and rice.  Get adequate Calcium and Vitamin D.     Lifestyle  Exercise at least 150 minutes a week (30 minutes a day, 5 days of the week). This will help you control your weight and prevent disease.  Limit alcohol to one drink per day.  No smoking.   Wear sunscreen to prevent skin cancer.  See your dentist every six months for an exam and cleaning.

## 2022-06-27 ASSESSMENT — ENCOUNTER SYMPTOMS
ARTHRALGIAS: 0
FREQUENCY: 0
DIARRHEA: 0
HEMATURIA: 0
DIZZINESS: 0
BREAST MASS: 0
HEADACHES: 0
SHORTNESS OF BREATH: 0
MYALGIAS: 0
JOINT SWELLING: 0
DYSURIA: 0
COUGH: 0
SORE THROAT: 0
CHILLS: 0
NAUSEA: 0
NERVOUS/ANXIOUS: 0
HEARTBURN: 0
PALPITATIONS: 0
PARESTHESIAS: 0
ABDOMINAL PAIN: 0
WEAKNESS: 0
HEMATOCHEZIA: 0
CONSTIPATION: 0
EYE PAIN: 0
FEVER: 0

## 2022-06-28 ENCOUNTER — OFFICE VISIT (OUTPATIENT)
Dept: FAMILY MEDICINE | Facility: CLINIC | Age: 37
End: 2022-06-28
Payer: COMMERCIAL

## 2022-06-28 VITALS
SYSTOLIC BLOOD PRESSURE: 112 MMHG | HEART RATE: 71 BPM | BODY MASS INDEX: 23.48 KG/M2 | OXYGEN SATURATION: 100 % | HEIGHT: 68 IN | RESPIRATION RATE: 16 BRPM | DIASTOLIC BLOOD PRESSURE: 74 MMHG | TEMPERATURE: 97.5 F | WEIGHT: 154.9 LBS

## 2022-06-28 DIAGNOSIS — M21.611 BUNION, RIGHT: ICD-10-CM

## 2022-06-28 DIAGNOSIS — R23.9 SKIN CHANGE: ICD-10-CM

## 2022-06-28 DIAGNOSIS — Z00.00 ROUTINE GENERAL MEDICAL EXAMINATION AT A HEALTH CARE FACILITY: Primary | ICD-10-CM

## 2022-06-28 DIAGNOSIS — Z13.6 CARDIOVASCULAR SCREENING; LDL GOAL LESS THAN 160: ICD-10-CM

## 2022-06-28 DIAGNOSIS — Z13.0 SCREENING, ANEMIA, DEFICIENCY, IRON: ICD-10-CM

## 2022-06-28 DIAGNOSIS — F41.8 POSTPARTUM ANXIETY: ICD-10-CM

## 2022-06-28 PROCEDURE — 99395 PREV VISIT EST AGE 18-39: CPT | Performed by: PHYSICIAN ASSISTANT

## 2022-06-28 RX ORDER — TRETINOIN 0.25 MG/G
GEL TOPICAL AT BEDTIME
Qty: 15 G | Refills: 1 | Status: SHIPPED | OUTPATIENT
Start: 2022-06-28 | End: 2024-07-17

## 2022-06-28 ASSESSMENT — ENCOUNTER SYMPTOMS
CONSTIPATION: 0
EYE PAIN: 0
BREAST MASS: 0
CHILLS: 0
NAUSEA: 0
PALPITATIONS: 0
MYALGIAS: 0
HEADACHES: 0
FREQUENCY: 0
HEMATURIA: 0
DYSURIA: 0
JOINT SWELLING: 0
PARESTHESIAS: 0
NERVOUS/ANXIOUS: 0
HEMATOCHEZIA: 0
DIARRHEA: 0
FEVER: 0
SHORTNESS OF BREATH: 0
ARTHRALGIAS: 0
WEAKNESS: 0
ABDOMINAL PAIN: 0
SORE THROAT: 0
DIZZINESS: 0
COUGH: 0
HEARTBURN: 0

## 2022-06-28 ASSESSMENT — PAIN SCALES - GENERAL: PAINLEVEL: NO PAIN (0)

## 2022-06-28 NOTE — NURSING NOTE
"Chief Complaint   Patient presents with     Physical     /74   Pulse 71   Temp 97.5  F (36.4  C) (Temporal)   Resp 16   Ht 1.727 m (5' 8\")   Wt 70.3 kg (154 lb 14.4 oz)   LMP 06/10/2022 (Approximate)   SpO2 100%   BMI 23.55 kg/m   Estimated body mass index is 23.55 kg/m  as calculated from the following:    Height as of this encounter: 1.727 m (5' 8\").    Weight as of this encounter: 70.3 kg (154 lb 14.4 oz).  bp completed using cuff size: regular      Health Maintenance addressed:  NONE    n/a    Kylee Monaco, RN, MA     "

## 2022-06-30 ENCOUNTER — TELEPHONE (OUTPATIENT)
Dept: FAMILY MEDICINE | Facility: CLINIC | Age: 37
End: 2022-06-30

## 2022-06-30 NOTE — TELEPHONE ENCOUNTER
Central Prior Authorization Team   Phone: [phone]    PA Initiation    Medication: Tretinoin gel  Insurance Company: [employer] ([facility]) - Phone [phone] Fax [phone]  Pharmacy Filling the Rx: [id number] - [place], [place] - [address] & [facility]  Filling Pharmacy Phone: [phone]  Filling Pharmacy Fax:    Start Date: 6/30/2022

## 2022-06-30 NOTE — TELEPHONE ENCOUNTER
Prior Authorization Retail Medication Request    Medication/Dose: Tretinoin gel  ICD code (if different than what is on RX): R23.9  Previously Tried and Failed:    Rationale:      Insurance Name:  [employer]  Insurance ID: [id number]       Pharmacy Information (if different than what is on RX)  Name: [id number] - [place], [place] - [address] & [facility]   Phone:

## 2022-07-05 NOTE — TELEPHONE ENCOUNTER
PRIOR AUTHORIZATION DENIED    Medication: Tretinoin gel    Denial Date: 7/2/2022    Denial Rational:  Patient must have a history of trial & failure to the formulary alternative(s) or have a contraindication or intolerance to the formulary alternatives:                Appeal Information:    If you would like to appeal, please supply P/A team with a letter of medical necessity with clinical reason.

## 2022-07-18 NOTE — PROGRESS NOTES
Assessment:      ICD-10-CM    1. Hallux valgus, right  M20.11 XR Foot Right G/E 3 Views     Orthopedic  Referral     Physical Therapy Referral   2. Pes valgus  Q66.6 XR Foot Left G/E 3 Views     Physical Therapy Referral          Plan:  Orders Placed This Encounter   Procedures     XR Foot Right G/E 3 Views     XR Foot Left G/E 3 Views     Physical Therapy Referral       Discussed the etiology and treatment of the condition with the patient.  Imaging studies reviewed and discussed with the patient.  Discussed surgical and conservative options.    Flexible flatfoot > bunions.  Mild hallux valgus R, mild hallux limitus L.  Moderate flatfeet    -PT referral - specifically intrinsic foot muscle strengthening, PT tendon strengthening, HEP  -Activity - no restrictions  -Continued symptoms - return    Return:  No follow-ups on file.    Cassandra García DPM                  Chief Complaint:     Patient presents with:  Foot Problems     bilateral bunion    HPI:  Brie Brown is a 37 year old year old female who presents for evaluation of bunion.    Pain location- big toe joint    Flat feet also  Mom had  Bunions, she is afraid she may also    Used to run, has done less since having kids but would like to get back to it        Past Medical & Surgical History:  Past Medical History:   Diagnosis Date     Abnormal Pap smear 2009    normals since     Anemia 2016    during last pregnancy     Anxiety      LSIL (low grade squamous intraepithelial lesion) on Pap smear 02/02/2009     NO ACTIVE PROBLEMS       Past Surgical History:   Procedure Laterality Date     ABDOMEN SURGERY  July 2020    Gallbladder     ENDOSCOPIC RETROGRADE CHOLANGIOPANCREATOGRAM N/A 08/26/2020    Procedure: ENDOSCOPIC RETROGRADE CHOLANGIOPANCREATOGRAPHY, Biliary Shincterectomy, Biliary Stone Removal and Pancreatic Stent Placement;  Surgeon: Rajinder Garza MD;  Location: [facility] OR     ENT SURGERY      Tonsils and adenoids removed when very  "young     LAPAROSCOPIC CHOLECYSTECTOMY N/A 07/24/2020    Procedure: LAPAROSCOPIC CHOLECYSTECTOMY;  Surgeon: Brandyn Luz MD;  Location: [facility] OR      Family History   Problem Relation Age of Onset     Lipids Mother      Hyperlipidemia Mother      Hypertension Father      Depression Father      Lipids Father      Hyperlipidemia Father      Heart Disease Paternal Grandfather         passsed away from MI     Prostate Cancer Paternal Grandfather      Cerebrovascular Disease Paternal Grandmother      Alcohol/Drug Paternal Grandmother      Arthritis Maternal Grandmother      Depression Sister         Social History:  ?  History   Smoking Status     Never Smoker   Smokeless Tobacco     Never Used     History   Drug Use No     Social History    Substance and Sexual Activity      Alcohol use: Yes        Comment: 5/week      Allergies:  ?   Allergies   Allergen Reactions     Augmentin      High fever as a child     Seasonal Allergies         Medications:    Current Outpatient Medications   Medication     sertraline (ZOLOFT) 50 MG tablet     tretinoin (RETIN-A) 0.025 % external gel     No current facility-administered medications for this visit.       Physical Exam:  ?  Vitals:  /70   Ht 1.727 m (5' 8\")   Wt 69.9 kg (154 lb)   LMP 06/10/2022 (Approximate)   BMI 23.42 kg/m     General:  WD/WN, in NAD.  A&O x3.  Dermatologic:    Skin is intact, open lesions absent.   Skin texture, turgor is normal.  Vascular:  Pulses palpable bilateral.  Digital capillary refill time normal bilateral.  Skin temperature is warm bilateral.  Generalized edema- none bilateral.  Focal edema- none  bilateral.  Neurologic:    Gross sensation normal.  Gait and balance normal.  Musculoskeletal:  Mild pain to palpation of big toe joints medially R, dorsally L.   1st MPJ fully reducible, smooth, painfree  Bilateral.  Ankle, STJ ROM full, pain free b/l    Muscle strength 5/5  foot and ankle.    Stance:  RCSP Valgus bilateral.  Foot type:  " Flexible valgus  Clinical deformity: mild hallux valgus R, limitus L    Imaging:   x-ray independently reviewed and interpreted by myself today.  Weight-bearing views bilateral foot dated 07/19/22, reveal moderate flatfoot w/ ~30% talar head uncovering, mild hallux valgus R, rectus hallux L.

## 2022-07-19 ENCOUNTER — OFFICE VISIT (OUTPATIENT)
Dept: PODIATRY | Facility: CLINIC | Age: 37
End: 2022-07-19
Payer: COMMERCIAL

## 2022-07-19 ENCOUNTER — ANCILLARY PROCEDURE (OUTPATIENT)
Dept: GENERAL RADIOLOGY | Facility: CLINIC | Age: 37
End: 2022-07-19
Attending: PODIATRIST
Payer: COMMERCIAL

## 2022-07-19 VITALS
WEIGHT: 154 LBS | HEIGHT: 68 IN | DIASTOLIC BLOOD PRESSURE: 70 MMHG | SYSTOLIC BLOOD PRESSURE: 114 MMHG | BODY MASS INDEX: 23.34 KG/M2

## 2022-07-19 DIAGNOSIS — M21.612 BUNION, LEFT FOOT: ICD-10-CM

## 2022-07-19 DIAGNOSIS — Q66.6 PES VALGUS: ICD-10-CM

## 2022-07-19 DIAGNOSIS — M20.11 HALLUX VALGUS, RIGHT: Primary | ICD-10-CM

## 2022-07-19 PROCEDURE — 99203 OFFICE O/P NEW LOW 30 MIN: CPT | Performed by: PODIATRIST

## 2022-07-19 PROCEDURE — 73630 X-RAY EXAM OF FOOT: CPT | Mod: TC | Performed by: RADIOLOGY

## 2022-07-19 NOTE — LETTER
7/19/2022         RE: Brie Brown  [address] [facility] [address]        Dear Colleague,    Thank you for referring your patient, Brie Brown, to the [facility]. Please see a copy of my visit note below.    Assessment:      ICD-10-CM    1. Hallux valgus, right  M20.11 XR Foot Right G/E 3 Views     Orthopedic  Referral     Physical Therapy Referral   2. Pes valgus  Q66.6 XR Foot Left G/E 3 Views     Physical Therapy Referral          Plan:  Orders Placed This Encounter   Procedures     XR Foot Right G/E 3 Views     XR Foot Left G/E 3 Views     Physical Therapy Referral       Discussed the etiology and treatment of the condition with the patient.  Imaging studies reviewed and discussed with the patient.  Discussed surgical and conservative options.    Flexible flatfoot > bunions.  Mild hallux valgus R, mild hallux limitus L.  Moderate flatfeet    -PT referral - specifically intrinsic foot muscle strengthening, PT tendon strengthening, HEP  -Activity - no restrictions  -Continued symptoms - return    Return:  No follow-ups on file.    Cassandra García DPM                  Chief Complaint:     Patient presents with:  Foot Problems     bilateral bunion    HPI:  Brie Brown is a 37 year old year old female who presents for evaluation of bunion.    Pain location- big toe joint    Flat feet also  Mom had  Bunions, she is afraid she may also    Used to run, has done less since having kids but would like to get back to it        Past Medical & Surgical History:  Past Medical History:   Diagnosis Date     Abnormal Pap smear 2009    normals since     Anemia 2016    during last pregnancy     Anxiety      LSIL (low grade squamous intraepithelial lesion) on Pap smear 02/02/2009     NO ACTIVE PROBLEMS       Past Surgical History:   Procedure Laterality Date     ABDOMEN SURGERY  July 2020    Gallbladder     ENDOSCOPIC RETROGRADE CHOLANGIOPANCREATOGRAM N/A 08/26/2020     "Procedure: ENDOSCOPIC RETROGRADE CHOLANGIOPANCREATOGRAPHY, Biliary Shincterectomy, Biliary Stone Removal and Pancreatic Stent Placement;  Surgeon: Rajinder Garza MD;  Location: [facility] OR     ENT SURGERY      Tonsils and adenoids removed when very young     LAPAROSCOPIC CHOLECYSTECTOMY N/A 07/24/2020    Procedure: LAPAROSCOPIC CHOLECYSTECTOMY;  Surgeon: Brandyn Luz MD;  Location:  OR      Family History   Problem Relation Age of Onset     Lipids Mother      Hyperlipidemia Mother      Hypertension Father      Depression Father      Lipids Father      Hyperlipidemia Father      Heart Disease Paternal Grandfather         passsed away from MI     Prostate Cancer Paternal Grandfather      Cerebrovascular Disease Paternal Grandmother      Alcohol/Drug Paternal Grandmother      Arthritis Maternal Grandmother      Depression Sister         Social History:  ?  History   Smoking Status     Never Smoker   Smokeless Tobacco     Never Used     History   Drug Use No     Social History    Substance and Sexual Activity      Alcohol use: Yes        Comment: 5/week      Allergies:  ?   Allergies   Allergen Reactions     Augmentin      High fever as a child     Seasonal Allergies         Medications:    Current Outpatient Medications   Medication     sertraline (ZOLOFT) 50 MG tablet     tretinoin (RETIN-A) 0.025 % external gel     No current facility-administered medications for this visit.       Physical Exam:  ?  Vitals:  /70   Ht 1.727 m (5' 8\")   Wt 69.9 kg (154 lb)   LMP 06/10/2022 (Approximate)   BMI 23.42 kg/m     General:  WD/WN, in NAD.  A&O x3.  Dermatologic:    Skin is intact, open lesions absent.   Skin texture, turgor is normal.  Vascular:  Pulses palpable bilateral.  Digital capillary refill time normal bilateral.  Skin temperature is warm bilateral.  Generalized edema- none bilateral.  Focal edema- none  bilateral.  Neurologic:    Gross sensation normal.  Gait and balance " normal.  Musculoskeletal:  Mild pain to palpation of big toe joints medially R, dorsally L.   1st MPJ fully reducible, smooth, painfree  Bilateral.  Ankle, STJ ROM full, pain free b/l    Muscle strength 5/5  foot and ankle.    Stance:  RCSP Valgus bilateral.  Foot type:  Flexible valgus  Clinical deformity: mild hallux valgus R, limitus L    Imaging:   x-ray independently reviewed and interpreted by myself today.  Weight-bearing views bilateral foot dated 07/19/22, reveal moderate flatfoot w/ ~30% talar head uncovering, mild hallux valgus R, rectus hallux L.                  Again, thank you for allowing me to participate in the care of your patient.        Sincerely,        Cassandra García DPM

## 2022-07-19 NOTE — PATIENT INSTRUCTIONS
"PATIENT INSTRUCTIONS - Podiatry / Foot & Ankle Surgery      Toe Spacers:  Flexible bunions without arthritis can benefit from multiple toe spacers, worn to align the toes and strengthen the small muscles in the feet, which may improve the bunion.  Effects can be seen after months to 1+ years of consistent use, in addition to performing \"toe yoga\" (see below).  They are intended for active individuals in shoes with a foot shaped toe box and toe socks worn underneath.    -Multiple toe spacers: https://www.correcttoes.com, https://naturalfootgear.com    Toe socks:   -Worn underneath spacers  -Help prevent blisters when worn in shoes with a wide toe box (with or without toe spacers)  -https://www.injinji.[employer]  -https://www.toesox.com.  -https://naturalfootgear.com    Shoes with a wide toe box:  - Foot shaped  - widest at the tips of the toes (non-tapering)  -Allow the toes to spread/splay as they naturally should  -Promote strengthening the small intrinsic muscles in the foot that balance the toes & support the arch  -Accommodate toe spacers   With arch support & cushioning:  Gena Oglesby, Keen,    With cushioning:  Altra, Dl, Lems   With minimal cushioning:  Altra, Dl, Vivokei, Lems    \"Toe yoga\":    Intrinsic foot muscle strengthening. For personal instruction on this,  please request a Physical Therapy referral from your doctor.    Surgical Procedures:  -Lapidus bunionectomy - 6 weeks no weight on foot, then 2-4 weeks weight bearing in boot   -Return to impact activity at 3+ months post-operatively            Physical Therapy  You have been referred to [facility].  Locations can be found online.  Please call to schedule an appointment:  [phone]          "

## 2023-01-07 ENCOUNTER — HEALTH MAINTENANCE LETTER (OUTPATIENT)
Age: 38
End: 2023-01-07

## 2023-01-18 DIAGNOSIS — F41.8 POSTPARTUM ANXIETY: ICD-10-CM

## 2023-01-19 NOTE — TELEPHONE ENCOUNTER
"Requested Prescriptions   Pending Prescriptions Disp Refills     sertraline (ZOLOFT) 50 MG tablet [Pharmacy Med Name: SERTRALINE 50MG TABLETS] 90 tablet 1     Sig: TAKE 1 TABLET(50 MG) BY MOUTH IN THE MORNING       SSRIs Protocol Passed - 1/18/2023 10:18 AM        Passed - Recent (12 mo) or future (30 days) visit within the authorizing provider's specialty     Patient has had an office visit with the authorizing provider or a provider within the authorizing providers department within the previous 12 mos or has a future within next 30 days. See \"Patient Info\" tab in inbasket, or \"Choose Columns\" in Meds & Orders section of the refill encounter.              Passed - Medication is active on med list        Passed - Patient is age 18 or older        Passed - No active pregnancy on record        Passed - No positive pregnancy test in last 12 months           Prescription approved per [facility] Refill Protocol.    Nubia Gillette RN  [facility]   "

## 2023-02-15 DIAGNOSIS — F41.8 POSTPARTUM ANXIETY: ICD-10-CM

## 2023-02-16 NOTE — TELEPHONE ENCOUNTER
"Requested Prescriptions   Pending Prescriptions Disp Refills     sertraline (ZOLOFT) 50 MG tablet 90 tablet 1       SSRIs Protocol Passed - 2/15/2023  5:24 PM        Passed - Recent (12 mo) or future (30 days) visit within the authorizing provider's specialty     Patient has had an office visit with the authorizing provider or a provider within the authorizing providers department within the previous 12 mos or has a future within next 30 days. See \"Patient Info\" tab in inbasket, or \"Choose Columns\" in Meds & Orders section of the refill encounter.              Passed - Medication is active on med list        Passed - Patient is age 18 or older        Passed - No active pregnancy on record        Passed - No positive pregnancy test in last 12 months           Prescription approved per [facility] Refill Protocol.    Nubia Gillette RN  [facility]   "

## 2023-05-30 ENCOUNTER — PATIENT OUTREACH (OUTPATIENT)
Dept: CARE COORDINATION | Facility: CLINIC | Age: 38
End: 2023-05-30
Payer: COMMERCIAL

## 2023-06-13 ENCOUNTER — PATIENT OUTREACH (OUTPATIENT)
Dept: CARE COORDINATION | Facility: CLINIC | Age: 38
End: 2023-06-13
Payer: COMMERCIAL

## 2023-06-20 NOTE — PATIENT INSTRUCTIONS
Before Your Surgery      Call your surgeon if there is any change in your health. This includes signs of a cold or flu (such as a sore throat, runny nose, cough, rash or fever).    Do not smoke, drink alcohol or take over the counter medicine (unless your surgeon or primary care doctor tells you to) for the 24 hours before and after surgery.    If you take prescribed drugs: Follow your doctor s orders about which medicines to take and which to stop until after surgery.    Eating and drinking prior to surgery: follow the instructions from your surgeon    Take a shower or bath the night before surgery. Use the soap your surgeon gave you to gently clean your skin. If you do not have soap from your surgeon, use your regular soap. Do not shave or scrub the surgery site.  Wear clean pajamas and have clean sheets on your bed.   
Home

## 2023-09-23 ENCOUNTER — HEALTH MAINTENANCE LETTER (OUTPATIENT)
Age: 38
End: 2023-09-23

## 2024-01-03 ENCOUNTER — OFFICE VISIT (OUTPATIENT)
Dept: FAMILY MEDICINE | Facility: CLINIC | Age: 39
End: 2024-01-03
Payer: COMMERCIAL

## 2024-01-03 ENCOUNTER — E-VISIT (OUTPATIENT)
Dept: URGENT CARE | Facility: CLINIC | Age: 39
End: 2024-01-03
Payer: COMMERCIAL

## 2024-01-03 VITALS
WEIGHT: 162 LBS | SYSTOLIC BLOOD PRESSURE: 114 MMHG | HEIGHT: 68 IN | RESPIRATION RATE: 16 BRPM | HEART RATE: 72 BPM | DIASTOLIC BLOOD PRESSURE: 68 MMHG | TEMPERATURE: 98.8 F | BODY MASS INDEX: 24.55 KG/M2 | OXYGEN SATURATION: 98 %

## 2024-01-03 DIAGNOSIS — R05.9 COUGH, UNSPECIFIED TYPE: Primary | ICD-10-CM

## 2024-01-03 DIAGNOSIS — J34.89 SINUS PRESSURE: ICD-10-CM

## 2024-01-03 DIAGNOSIS — R53.83 OTHER FATIGUE: ICD-10-CM

## 2024-01-03 DIAGNOSIS — R06.2 WHEEZING: Primary | ICD-10-CM

## 2024-01-03 DIAGNOSIS — J20.9 ACUTE BRONCHITIS WITH SYMPTOMS > 10 DAYS: ICD-10-CM

## 2024-01-03 PROCEDURE — 99214 OFFICE O/P EST MOD 30 MIN: CPT | Performed by: FAMILY MEDICINE

## 2024-01-03 PROCEDURE — 99207 PR NON-BILLABLE SERV PER CHARTING: CPT | Performed by: PHYSICIAN ASSISTANT

## 2024-01-03 RX ORDER — AZITHROMYCIN 250 MG/1
TABLET, FILM COATED ORAL
Qty: 6 TABLET | Refills: 0 | Status: SHIPPED | OUTPATIENT
Start: 2024-01-03 | End: 2024-01-08

## 2024-01-03 ASSESSMENT — ENCOUNTER SYMPTOMS: COUGH: 1

## 2024-01-03 ASSESSMENT — PAIN SCALES - GENERAL: PAINLEVEL: NO PAIN (0)

## 2024-01-03 NOTE — PATIENT INSTRUCTIONS
Dear Brie Brown,    We are sorry you are not feeling well. Based on the responses you provided, it is recommended that you be seen in-person in urgent care so we can better evaluate your symptoms. Please click here to find the nearest urgent care location to you.   You will not be charged for this Visit. Thank you for trusting us with your care.    Thang Purvis PA-C

## 2024-01-03 NOTE — PATIENT INSTRUCTIONS
Take antibiotics if not better  Cough medication , and saline /nasal lavage  Follow up for wellness and pap,labs  Fede Alonso D.O.        Patient Education

## 2024-01-03 NOTE — PROGRESS NOTES
1. Acute bronchitis with symptoms > 10 days  New patient to this clinic/provider with weeks history of UPPER RESPIRATORY INFECTION sx, has been slowly improving, no fever, has had neg covid , lung sound and virals stable, offered chest x-ray but since we are treating with antibiotics if not better did not see the benefit.  Followup with pcp if not resolving  - azithromycin (ZITHROMAX) 250 MG tablet; Take 2 tablets (500 mg) by mouth daily for 1 day, THEN 1 tablet (250 mg) daily for 4 days.  Dispense: 6 tablet; Refill: 0    2. Cough, unspecified type  See above, use delsym, netti pot, saline spray, honey etc  - azithromycin (ZITHROMAX) 250 MG tablet; Take 2 tablets (500 mg) by mouth daily for 1 day, THEN 1 tablet (250 mg) daily for 4 days.  Dispense: 6 tablet; Refill: 0    3. Sinus pressure  As above  - azithromycin (ZITHROMAX) 250 MG tablet; Take 2 tablets (500 mg) by mouth daily for 1 day, THEN 1 tablet (250 mg) daily for 4 days.  Dispense: 6 tablet; Refill: 0    4. Other fatigue  As above      Subjective   Brie is a 38 year old, presenting for the following health issues:  Cough      1/3/2024    12:41 PM   Additional Questions   Roomed by JOSUÉ     Patient has a lot of congestion, possible sinus infection.   A lot of green mucous,   Neti pots and mucinex used with little improvement  Tired and headaches throughgut illness  Was exposed to strep over holidays but no sore throat symptoms for her..   Numerous negative COVID tests    No history of lung disease   No history of smoking    2 covid tets neg  Uptodate   Kids are sick off and on    She feels like her congestion and improved with neti pot  Facial pain and pressure in face  Cough not resolving  Run down  No fever      Cough    History of Present Illness       Reason for visit:  Persistent cough  Symptom onset:  3-4 weeks ago  Symptoms include:  Cough  Symptom intensity:  Moderate  Symptom progression:  Staying the same  Had these symptoms before:   Yes  Has tried/received treatment for these symptoms:  No    She eats 2-3 servings of fruits and vegetables daily.She consumes 1 sweetened beverage(s) daily.She exercises with enough effort to increase her heart rate 30 to 60 minutes per day.  She exercises with enough effort to increase her heart rate 5 days per week.   She is taking medications regularly.           Review of Systems   Respiratory:  Positive for cough.       Constitutional, HEENT, cardiovascular, pulmonary, GI, , musculoskeletal, neuro, skin, endocrine and psych systems are negative, except as otherwise noted.      Objective    There were no vitals taken for this visit.  There is no height or weight on file to calculate BMI.  Physical Exam   GENERAL: healthy, alert and no distress  EYES: Eyes grossly normal to inspection, PERRL and conjunctivae and sclerae normal  HENT: ear canals and TM's normal, nose and mouth without ulcers or lesions  NECK: no adenopathy, no asymmetry, masses, or scars and thyroid normal to palpation  RESP: lungs clear to auscultation - no rales, rhonchi or wheezes  CV: regular rate and rhythm, normal S1 S2, no S3 or S4, no murmur, click or rub, no peripheral edema and peripheral pulses strong  ABDOMEN: soft, nontender, no hepatosplenomegaly, no masses and bowel sounds normal  MS: no gross musculoskeletal defects noted, no edema

## 2024-07-17 ENCOUNTER — OFFICE VISIT (OUTPATIENT)
Dept: FAMILY MEDICINE | Facility: CLINIC | Age: 39
End: 2024-07-17
Payer: COMMERCIAL

## 2024-07-17 VITALS
TEMPERATURE: 97.7 F | SYSTOLIC BLOOD PRESSURE: 110 MMHG | HEIGHT: 68 IN | HEART RATE: 67 BPM | WEIGHT: 153.4 LBS | BODY MASS INDEX: 23.25 KG/M2 | RESPIRATION RATE: 14 BRPM | DIASTOLIC BLOOD PRESSURE: 60 MMHG | OXYGEN SATURATION: 100 %

## 2024-07-17 DIAGNOSIS — Z00.00 ROUTINE GENERAL MEDICAL EXAMINATION AT A HEALTH CARE FACILITY: Primary | ICD-10-CM

## 2024-07-17 DIAGNOSIS — Z12.4 CERVICAL CANCER SCREENING: ICD-10-CM

## 2024-07-17 PROBLEM — K80.20 CHOLELITHIASIS: Status: RESOLVED | Noted: 2020-07-07 | Resolved: 2024-07-17

## 2024-07-17 PROBLEM — K80.50 CHOLEDOCHOLITHIASIS: Status: RESOLVED | Noted: 2020-08-26 | Resolved: 2024-07-17

## 2024-07-17 PROCEDURE — G0145 SCR C/V CYTO,THINLAYER,RESCR: HCPCS | Performed by: PHYSICIAN ASSISTANT

## 2024-07-17 PROCEDURE — 87624 HPV HI-RISK TYP POOLED RSLT: CPT | Performed by: PHYSICIAN ASSISTANT

## 2024-07-17 PROCEDURE — 99395 PREV VISIT EST AGE 18-39: CPT | Performed by: PHYSICIAN ASSISTANT

## 2024-07-17 SDOH — HEALTH STABILITY: PHYSICAL HEALTH: ON AVERAGE, HOW MANY DAYS PER WEEK DO YOU ENGAGE IN MODERATE TO STRENUOUS EXERCISE (LIKE A BRISK WALK)?: 5 DAYS

## 2024-07-17 SDOH — HEALTH STABILITY: PHYSICAL HEALTH: ON AVERAGE, HOW MANY MINUTES DO YOU ENGAGE IN EXERCISE AT THIS LEVEL?: 50 MIN

## 2024-07-17 ASSESSMENT — SOCIAL DETERMINANTS OF HEALTH (SDOH): HOW OFTEN DO YOU GET TOGETHER WITH FRIENDS OR RELATIVES?: THREE TIMES A WEEK

## 2024-07-17 ASSESSMENT — PAIN SCALES - GENERAL: PAINLEVEL: NO PAIN (0)

## 2024-07-17 NOTE — PATIENT INSTRUCTIONS
Patient Education   Preventive Care Advice   This is general advice given by our system to help you stay healthy. However, your care team may have specific advice just for you. Please talk to your care team about your preventive care needs.  Nutrition  Eat 5 or more servings of fruits and vegetables each day.  Try wheat bread, brown rice and whole grain pasta (instead of white bread, rice, and pasta).  Get enough calcium and vitamin D. Check the label on foods and aim for 100% of the RDA (recommended daily allowance).  Lifestyle  Exercise at least 150 minutes each week  (30 minutes a day, 5 days a week).  Do muscle strengthening activities 2 days a week. These help control your weight and prevent disease.  No smoking.  Wear sunscreen to prevent skin cancer.  Have a dental exam and cleaning every 6 months.  Yearly exams  See your health care team every year to talk about:  Any changes in your health.  Any medicines your care team has prescribed.  Preventive care, family planning, and ways to prevent chronic diseases.  Shots (vaccines)   HPV shots (up to age 26), if you've never had them before.  Hepatitis B shots (up to age 59), if you've never had them before.  COVID-19 shot: Get this shot when it's due.  Flu shot: Get a flu shot every year.  Tetanus shot: Get a tetanus shot every 10 years.  Pneumococcal, hepatitis A, and RSV shots: Ask your care team if you need these based on your risk.  Shingles shot (for age 50 and up)  General health tests  Diabetes screening:  Starting at age 35, Get screened for diabetes at least every 3 years.  If you are younger than age 35, ask your care team if you should be screened for diabetes.  Cholesterol test: At age 39, start having a cholesterol test every 5 years, or more often if advised.  Bone density scan (DEXA): At age 50, ask your care team if you should have this scan for osteoporosis (brittle bones).  Hepatitis C: Get tested at least once in your life.  STIs (sexually  transmitted infections)  Before age 24: Ask your care team if you should be screened for STIs.  After age 24: Get screened for STIs if you're at risk. You are at risk for STIs (including HIV) if:  You are sexually active with more than one person.  You don't use condoms every time.  You or a partner was diagnosed with a sexually transmitted infection.  If you are at risk for HIV, ask about PrEP medicine to prevent HIV.  Get tested for HIV at least once in your life, whether you are at risk for HIV or not.  Cancer screening tests  Cervical cancer screening: If you have a cervix, begin getting regular cervical cancer screening tests starting at age 21.  Breast cancer scan (mammogram): If you've ever had breasts, begin having regular mammograms starting at age 40. This is a scan to check for breast cancer.  Colon cancer screening: It is important to start screening for colon cancer at age 45.  Have a colonoscopy test every 10 years (or more often if you're at risk) Or, ask your provider about stool tests like a FIT test every year or Cologuard test every 3 years.  To learn more about your testing options, visit:   .  For help making a decision, visit:   https://bit.ly/jw79098.  Prostate cancer screening test: If you have a prostate, ask your care team if a prostate cancer screening test (PSA) at age 55 is right for you.  Lung cancer screening: If you are a current or former smoker ages 50 to 80, ask your care team if ongoing lung cancer screenings are right for you.  For informational purposes only. Not to replace the advice of your health care provider. Copyright   2023 [employer]. All rights reserved. Clinically reviewed by the [facility] Transitions Program. [employer] [id number] - REV 01/24.

## 2024-07-17 NOTE — PROGRESS NOTES
Prevent[facility] Visit  [facility], Internal [facility]  Jul 17, 2024      Assessment & Plan       ICD-10-CM    1. Routine general medical examination at a health care facility  Z00.00       2. Cervical cancer screening  Z12.4 Pap Screen with HPV - Recommended Age 30 - 65 Years            Counseling  Appropriate preventive services were addressed with this patient via screening, questionnaire, or discussion as appropriate for fall prevention, nutrition, physical activity, Tobacco-use cessation, weight loss and cognition.  Checklist reviewing preventive services available has been given to the patient.  Reviewed patient's diet, addressing concerns and/or questions.   The patient was instructed to see the dentist every 6 months.   She is at risk for psychosocial distress and has been provided with information to reduce risk.           Aaron Baird is a 39 year old, presenting for the following:  Physical and Establish Care        7/17/2024    11:09 AM   Additional Questions   Roomed by Darleen WATKINS        Health Care Directive  Patient does not have a Health Care Directive or Living Will: Discussed advance care planning with patient; however, patient declined at this time.    HPI          7/17/2024   General Health   How would you rate your overall physical health? Good   Feel stress (tense, anxious, or unable to sleep) Only a little      (!) STRESS CONCERN      7/17/2024   Nutrition   Three or more servings of calcium each day? Yes   Diet: Regular (no restrictions)   How many servings of fruit and vegetables per day? 4 or more   How many sweetened beverages each day? 0-1            7/17/2024   Exercise   Days per week of moderate/strenous exercise 5 days   Average minutes spent exercising at this level 50 min          7/17/2024   Social Factors   Frequency of gathering with friends or relatives Three times a week   Worry food won't last until get money to buy more  No   Food not last or not have enough money for food? No   Do you have housing? (Housing is defined as stable permanent housing and does not include staying ouside in a car, in a tent, in an abandoned building, in an overnight shelter, or couch-surfing.) Yes   Are you worried about losing your housing? No   Lack of transportation? No   Unable to get utilities (heat,electricity)? No            7/17/2024   Dental   Dentist two times every year? (!) NO            7/17/2024   TB Screening   Were you born outside of the [place]? No            Today's PHQ-2 Score:       1/3/2024    12:39 PM   PHQ-2 ( 1999 [employer])   Q1: Little interest or pleasure in doing things 0   Q2: Feeling down, depressed or hopeless 0   PHQ-2 Score 0   Q1: Little interest or pleasure in doing things Not at all   Q2: Feeling down, depressed or hopeless Not at all   PHQ-2 Score 0         7/17/2024   Substance Use   Alcohol more than 3/day or more than 7/wk No   Do you use any other substances recreationally? (!) CANNABIS PRODUCTS        Social History     Tobacco Use    Smoking status: Never    Smokeless tobacco: Never   Vaping Use    Vaping status: Never Used   Substance Use Topics    Alcohol use: Yes     Comment: 5/week    Drug use: No          Mammogram Screening - Patient under 40 years of age: Routine Mammogram Screening not recommended.         7/17/2024   STI Screening   New sexual partner(s) since last STI/HIV test? No        History of abnormal Pap smear: No - age 30- 64 PAP with HPV every 5 years recommended        Latest Ref Rng & Units 4/24/2019     2:30 PM 4/24/2019     2:21 PM 6/14/2012     7:21 PM   PAP / HPV   PAP (Historical)   NIL  NIL    HPV 16 DNA NEG^Negative Negative      HPV 18 DNA NEG^Negative Negative      Other HR HPV NEG^Negative Negative              7/17/2024   Contraception/Family Planning   Questions about contraception or family planning No           Reviewed and updated as needed this visit by Provider   Tobacco  Allergies  " Meds  Problems  Med Hx  Surg Hx  Fam Hx                  Review of Systems  Constitutional, HEENT, cardiovascular, pulmonary, GI, , musculoskeletal, neuro, skin, endocrine and psych systems are negative, except as otherwise noted.     Objective    Exam  /60 (BP Location: Left arm, Patient Position: Sitting, Cuff Size: Adult Regular)   Pulse 67   Temp 97.7  F (36.5  C) (Tympanic)   Resp 14   Ht 1.721 m (5' 7.75\")   Wt 69.6 kg (153 lb 6.4 oz)   LMP 07/17/2024 (Exact Date)   SpO2 100%   Breastfeeding No   BMI 23.50 kg/m     Estimated body mass index is 23.5 kg/m  as calculated from the following:    Height as of this encounter: 1.721 m (5' 7.75\").    Weight as of this encounter: 69.6 kg (153 lb 6.4 oz).    Physical Exam  GENERAL: alert and no distress  EYES: Eyes grossly normal to inspection, PERRL and conjunctivae and sclerae normal  HENT: ear canals and TM's normal, nose and mouth without ulcers or lesions  NECK: no adenopathy, no asymmetry, masses, or scars  RESP: lungs clear to auscultation - no rales, rhonchi or wheezes  BREAST: normal without masses, tenderness or nipple discharge and no palpable axillary masses or adenopathy  CV: regular rate and rhythm, normal S1 S2, no S3 or S4, no murmur, click or rub, no peripheral edema  ABDOMEN: soft, nontender, no hepatosplenomegaly, no masses and bowel sounds normal   (female) w/bimanual: normal female external genitalia, normal urethral meatus, normal vaginal mucosa, and normal cervix/adnexa/uterus with no masses and scant bloody discharge  MS: no gross musculoskeletal defects noted, no edema  SKIN: no suspicious lesions or rashes  NEURO: Normal strength and tone, mentation intact and speech normal  PSYCH: mentation appears normal, affect normal/bright        Signed Electronically by: Lilli Samuels PA-C    "

## 2024-07-18 LAB
HPV HR 12 DNA CVX QL NAA+PROBE: NEGATIVE
HPV16 DNA CVX QL NAA+PROBE: NEGATIVE
HPV18 DNA CVX QL NAA+PROBE: NEGATIVE
HUMAN PAPILLOMA VIRUS FINAL DIAGNOSIS: NORMAL

## 2024-07-23 LAB
BKR LAB AP GYN ADEQUACY: NORMAL
BKR LAB AP GYN INTERPRETATION: NORMAL
BKR LAB AP PREVIOUS ABNORMAL: NORMAL
PATH REPORT.COMMENTS IMP SPEC: NORMAL
PATH REPORT.COMMENTS IMP SPEC: NORMAL
PATH REPORT.RELEVANT HX SPEC: NORMAL

## 2025-06-17 ENCOUNTER — PATIENT OUTREACH (OUTPATIENT)
Dept: CARE COORDINATION | Facility: CLINIC | Age: 40
End: 2025-06-17
Payer: COMMERCIAL

## 2025-07-01 ENCOUNTER — PATIENT OUTREACH (OUTPATIENT)
Dept: CARE COORDINATION | Facility: CLINIC | Age: 40
End: 2025-07-01
Payer: COMMERCIAL

## 2025-08-23 ENCOUNTER — HEALTH MAINTENANCE LETTER (OUTPATIENT)
Age: 40
End: 2025-08-23

## (undated) DEVICE — GLOVE PROTEXIS W/NEU-THERA 8.0  2D73TE80

## (undated) DEVICE — ENDO BITE BLOCK ADULT OMNI-BLOC

## (undated) DEVICE — WIRE GUIDE 0.025"X270CM STR VISIGLIDE G-240-2527S

## (undated) DEVICE — KIT ENDO FIRST STEP DISINFECTANT 200ML W/POUCH EP-4

## (undated) DEVICE — BIOPSY VALVE BIOSHIELD 00711135

## (undated) DEVICE — WIRE GUIDE 0.025"X270CM ANG VISIGLIDE G-240-2527A

## (undated) DEVICE — BALLOON EXTRACTION 20X1950MM 3.2MM TL B-V443Q-A

## (undated) DEVICE — SUCTION IRR STRYKERFLOW II W/TIP 250-070-520

## (undated) DEVICE — ENDO FUSION OMNI-TOME G31903

## (undated) DEVICE — PREP CHLORAPREP 26ML TINTED ORANGE  260815

## (undated) DEVICE — SYR 10ML LL W/O NDL 302995

## (undated) DEVICE — PACK LAP CHOLE SLC15LCFSD

## (undated) DEVICE — ENDO CAP AND TUBING STERILE FOR ENDOGATOR  100130

## (undated) DEVICE — PACK ENDOSCOPY GI CUSTOM UMMC

## (undated) DEVICE — SUCTION MANIFOLD DORNOCH ULTRA CART UL-CL500

## (undated) DEVICE — SOL WATER IRRIG 1000ML BOTTLE 2F7114

## (undated) DEVICE — ENDO TUBING CO2 SMARTCAP STERILE DISP 100145CO2EXT

## (undated) DEVICE — ENDO TROCAR FIRST ENTRY KII FIOS Z-THRD 11X100MM CTF33

## (undated) DEVICE — NDL INSUFFLATION 13GA 120MM C2201

## (undated) DEVICE — ENDO TROCAR FIRST ENTRY KII FIOS Z-THRD 05X100MM CTF03

## (undated) DEVICE — ENDO TROCAR SLEEVE KII Z-THREADED 05X100MM CTS02

## (undated) DEVICE — ENDO SCOPE WARMER LF TM500

## (undated) DEVICE — ENDO DEVICE LOCKING AND BIOPSY CAP M00545261

## (undated) DEVICE — POUCH TISSUE RETRIEVAL LAP 10MM 2.21" INTRO TRS100SB2

## (undated) DEVICE — SUCTION CANISTER MEDIVAC LINER 3000ML W/LID 65651-530

## (undated) DEVICE — SU VICRYL 4-0 PS-2 18" UND J496H

## (undated) DEVICE — ESU GROUND PAD ADULT W/CORD E7507

## (undated) DEVICE — LINEN TOWEL PACK X5 5464

## (undated) DEVICE — ESU GROUND PAD UNIVERSAL W/O CORD

## (undated) DEVICE — GOWN IMPERVIOUS SPECIALTY XLG/XLONG 32474

## (undated) DEVICE — ESU HOLDER LAP INST DISP PURPLE LONG 330MM H-PRO-330

## (undated) DEVICE — EVAC SYSTEM CLEAR FLOW SC082500

## (undated) RX ORDER — DEXAMETHASONE SODIUM PHOSPHATE 4 MG/ML
INJECTION, SOLUTION INTRA-ARTICULAR; INTRALESIONAL; INTRAMUSCULAR; INTRAVENOUS; SOFT TISSUE
Status: DISPENSED
Start: 2020-07-24

## (undated) RX ORDER — FENTANYL CITRATE 50 UG/ML
INJECTION, SOLUTION INTRAMUSCULAR; INTRAVENOUS
Status: DISPENSED
Start: 2020-08-26

## (undated) RX ORDER — ONDANSETRON 2 MG/ML
INJECTION INTRAMUSCULAR; INTRAVENOUS
Status: DISPENSED
Start: 2020-08-26

## (undated) RX ORDER — ESMOLOL HYDROCHLORIDE 10 MG/ML
INJECTION INTRAVENOUS
Status: DISPENSED
Start: 2020-08-26

## (undated) RX ORDER — PROPOFOL 10 MG/ML
INJECTION, EMULSION INTRAVENOUS
Status: DISPENSED
Start: 2020-08-26

## (undated) RX ORDER — PROPOFOL 10 MG/ML
INJECTION, EMULSION INTRAVENOUS
Status: DISPENSED
Start: 2020-07-24

## (undated) RX ORDER — BUPIVACAINE HYDROCHLORIDE AND EPINEPHRINE 2.5; 5 MG/ML; UG/ML
INJECTION, SOLUTION EPIDURAL; INFILTRATION; INTRACAUDAL; PERINEURAL
Status: DISPENSED
Start: 2020-07-24

## (undated) RX ORDER — GLYCOPYRROLATE 0.2 MG/ML
INJECTION, SOLUTION INTRAMUSCULAR; INTRAVENOUS
Status: DISPENSED
Start: 2020-07-24

## (undated) RX ORDER — FENTANYL CITRATE 50 UG/ML
INJECTION, SOLUTION INTRAMUSCULAR; INTRAVENOUS
Status: DISPENSED
Start: 2020-07-24

## (undated) RX ORDER — LIDOCAINE HYDROCHLORIDE 20 MG/ML
INJECTION, SOLUTION EPIDURAL; INFILTRATION; INTRACAUDAL; PERINEURAL
Status: DISPENSED
Start: 2020-07-24

## (undated) RX ORDER — HYDROCODONE BITARTRATE AND ACETAMINOPHEN 5; 325 MG/1; MG/1
TABLET ORAL
Status: DISPENSED
Start: 2020-07-24

## (undated) RX ORDER — ONDANSETRON 2 MG/ML
INJECTION INTRAMUSCULAR; INTRAVENOUS
Status: DISPENSED
Start: 2020-07-24

## (undated) RX ORDER — LIDOCAINE HYDROCHLORIDE 20 MG/ML
INJECTION, SOLUTION EPIDURAL; INFILTRATION; INTRACAUDAL; PERINEURAL
Status: DISPENSED
Start: 2020-08-26

## (undated) RX ORDER — DEXAMETHASONE SODIUM PHOSPHATE 4 MG/ML
INJECTION, SOLUTION INTRA-ARTICULAR; INTRALESIONAL; INTRAMUSCULAR; INTRAVENOUS; SOFT TISSUE
Status: DISPENSED
Start: 2020-08-26

## (undated) RX ORDER — KETOROLAC TROMETHAMINE 30 MG/ML
INJECTION, SOLUTION INTRAMUSCULAR; INTRAVENOUS
Status: DISPENSED
Start: 2020-07-24

## (undated) RX ORDER — NEOSTIGMINE METHYLSULFATE 1 MG/ML
VIAL (ML) INJECTION
Status: DISPENSED
Start: 2020-07-24

## (undated) RX ORDER — CLINDAMYCIN PHOSPHATE 900 MG/50ML
INJECTION, SOLUTION INTRAVENOUS
Status: DISPENSED
Start: 2020-07-24